# Patient Record
Sex: FEMALE | Race: BLACK OR AFRICAN AMERICAN | NOT HISPANIC OR LATINO | Employment: STUDENT | URBAN - METROPOLITAN AREA
[De-identification: names, ages, dates, MRNs, and addresses within clinical notes are randomized per-mention and may not be internally consistent; named-entity substitution may affect disease eponyms.]

---

## 2017-03-28 ENCOUNTER — OFFICE VISIT (OUTPATIENT)
Dept: OBSTETRICS AND GYNECOLOGY | Facility: CLINIC | Age: 22
End: 2017-03-28
Payer: MEDICAID

## 2017-03-28 VITALS
DIASTOLIC BLOOD PRESSURE: 74 MMHG | BODY MASS INDEX: 25.62 KG/M2 | SYSTOLIC BLOOD PRESSURE: 118 MMHG | HEIGHT: 60 IN | WEIGHT: 130.5 LBS

## 2017-03-28 DIAGNOSIS — N89.8 VAGINAL DISCHARGE: Primary | ICD-10-CM

## 2017-03-28 DIAGNOSIS — N76.0 BACTERIAL VAGINOSIS: ICD-10-CM

## 2017-03-28 DIAGNOSIS — B96.89 BACTERIAL VAGINOSIS: ICD-10-CM

## 2017-03-28 DIAGNOSIS — N94.6 DYSMENORRHEA: ICD-10-CM

## 2017-03-28 PROCEDURE — 99999 PR PBB SHADOW E&M-EST. PATIENT-LVL II: CPT | Mod: PBBFAC,,, | Performed by: OBSTETRICS & GYNECOLOGY

## 2017-03-28 PROCEDURE — 87210 SMEAR WET MOUNT SALINE/INK: CPT | Mod: PBBFAC,PO | Performed by: OBSTETRICS & GYNECOLOGY

## 2017-03-28 PROCEDURE — 99212 OFFICE O/P EST SF 10 MIN: CPT | Mod: PBBFAC,PO | Performed by: OBSTETRICS & GYNECOLOGY

## 2017-03-28 PROCEDURE — 99203 OFFICE O/P NEW LOW 30 MIN: CPT | Mod: 25,S$PBB,, | Performed by: OBSTETRICS & GYNECOLOGY

## 2017-03-28 PROCEDURE — 87591 N.GONORRHOEAE DNA AMP PROB: CPT

## 2017-03-28 RX ORDER — NAPROXEN SODIUM 550 MG/1
550 TABLET ORAL EVERY 12 HOURS PRN
Qty: 30 TABLET | Refills: 3 | Status: SHIPPED | OUTPATIENT
Start: 2017-03-28 | End: 2018-03-01

## 2017-03-28 RX ORDER — METRONIDAZOLE 500 MG/1
500 TABLET ORAL 2 TIMES DAILY
Qty: 14 TABLET | Refills: 0 | Status: SHIPPED | OUTPATIENT
Start: 2017-03-28 | End: 2017-04-04

## 2017-03-28 RX ORDER — LEVONORGESTREL AND ETHINYL ESTRADIOL 0.15-0.03
1 KIT ORAL DAILY
Qty: 84 TABLET | Refills: 3 | Status: SHIPPED | OUTPATIENT
Start: 2017-03-28 | End: 2018-03-01

## 2017-03-28 RX ORDER — NORGESTIMATE AND ETHINYL ESTRADIOL 7DAYSX3 28
KIT ORAL
COMMUNITY
Start: 2017-03-12 | End: 2017-03-28 | Stop reason: ALTCHOICE

## 2017-03-28 NOTE — PROGRESS NOTES
Subjective:       Patient ID: Saima Da Silva is a 22 y.o. female.    Chief Complaint:  Vaginal Discharge      History of Present Illness  HPI  Presents with vaginal d/c with odor x a few months.  Some help after starting a probiotic, but odor still present.  Is mutually monogamous with one male partner.  Uses tampons.  Takes showers only, and does not douche.  Pt takes Tri Previfem OCP's, but still has significant cramping during her period.  Minimal relief with ibuprofen.  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    GYN & OB History  Patient's last menstrual period was 2017 (approximate).   Date of Last Pap: No result found    OB History    Para Term  AB SAB TAB Ectopic Multiple Living   0 0 0 0 0 0 0 0 0 0             Review of Systems  Review of Systems   Constitutional: Negative for fatigue, fever and unexpected weight change.   Gastrointestinal: Negative for abdominal pain, constipation, diarrhea, nausea and vomiting.   Genitourinary: Positive for vaginal discharge, dysmenorrhea and vaginal odor. Negative for dyspareunia, dysuria, frequency, hematuria, menorrhagia, menstrual problem, pelvic pain, urgency, vaginal bleeding, vaginal pain and postcoital bleeding.           Objective:    Physical Exam:   Constitutional: She is oriented to person, place, and time. She appears well-developed and well-nourished. No distress.             Abdominal: Soft. She exhibits no distension and no mass. There is no tenderness. There is no rebound and no guarding. Hernia confirmed negative in the right inguinal area and confirmed negative in the left inguinal area.     Genitourinary: Pelvic exam was performed with patient supine. There is no rash, tenderness, lesion or injury on the right labia. There is no rash, tenderness, lesion or injury on the left labia. Uterus is not deviated, not enlarged, not fixed, not tender and not experiencing uterine prolapse. Right adnexum displays  no mass, no tenderness and no fullness. Left adnexum displays no mass, no tenderness and no fullness. No erythema, tenderness, bleeding, rectocele, cystocele or unspecified prolapse of vaginal walls in the vagina. No foreign body in the vagina. No signs of injury around the vagina. Vaginal discharge (white with fishy odor) found. Cervix exhibits no motion tenderness, no discharge and no friability.        Uterus Size: 6 cm       Neurological: She is alert and oriented to person, place, and time.     Psychiatric: She has a normal mood and affect.        wet prep: many clue cells  Assessment:        1. Vaginal discharge    2. Dysmenorrhea    3. Bacterial vaginosis                Plan:      Saima was seen today for vaginal discharge.    Diagnoses and all orders for this visit:    Vaginal discharge  -     C. trachomatis/N. gonorrhoeae by AMP DNA Cervix    Dysmenorrhea  -     levonorgestrel-ethinyl estradiol (SEASONALE) 0.15 mg-30 mcg per tablet; Take 1 tablet by mouth once daily.  -     naproxen sodium (ANAPROX) 550 MG tablet; Take 1 tablet (550 mg total) by mouth every 12 (twelve) hours as needed.    Bacterial vaginosis  -     metronidazole (FLAGYL) 500 MG tablet; Take 1 tablet (500 mg total) by mouth 2 (two) times daily.    Patient was counseled today on vaginitis prevention including :  a. avoiding feminine products such as deoderant soaps, body wash, bubble bath, douches, scented toilet paper, deoderant tampons or pads, feminine wipes, chronic pad use, etc.  b. avoiding other vulvovaginal irritants such as long hot baths, humidity, tight, synthetic clothing, chlorine and sitting around in wet bathing suits  c. wearing cotton underwear, avoiding thong underwear and no underwear to bed  d. taking showers instead of baths and use a hair dryer on cool setting afterwards to dry  e. wearing cotton to exercise and shower immediately after exercise and change clothes  Will switch to prolonged dosing OCP's and add  naproxen.  RTC 1 year or sooner prn.

## 2017-04-03 LAB
C TRACH DNA SPEC QL NAA+PROBE: NEGATIVE
N GONORRHOEA DNA SPEC QL NAA+PROBE: NEGATIVE

## 2017-06-05 ENCOUNTER — TELEPHONE (OUTPATIENT)
Dept: OBSTETRICS AND GYNECOLOGY | Facility: CLINIC | Age: 22
End: 2017-06-05

## 2017-06-05 DIAGNOSIS — N76.0 BACTERIAL VAGINOSIS: Primary | ICD-10-CM

## 2017-06-05 DIAGNOSIS — B96.89 BACTERIAL VAGINOSIS: Primary | ICD-10-CM

## 2017-06-05 NOTE — TELEPHONE ENCOUNTER
----- Message from Nash Rodriguez sent at 6/2/2017  3:27 PM CDT -----  Contact: pt   State she is calling to get a refill on her antibiotic/ pt is unaware of the name and can be reached at [phone]//rayna/susanna    pls call when it's called in per pt     Pt pharm is   [facility] - [address]  [address] [facility] [address]  Phone: [phone] Fax: [phone]

## 2017-06-06 RX ORDER — METRONIDAZOLE 500 MG/1
500 TABLET ORAL 2 TIMES DAILY
Qty: 14 TABLET | Refills: 0 | Status: SHIPPED | OUTPATIENT
Start: 2017-06-06 | End: 2017-06-13

## 2018-03-01 ENCOUNTER — TELEPHONE (OUTPATIENT)
Dept: OBSTETRICS AND GYNECOLOGY | Facility: CLINIC | Age: 23
End: 2018-03-01

## 2018-03-01 ENCOUNTER — OFFICE VISIT (OUTPATIENT)
Dept: OBSTETRICS AND GYNECOLOGY | Facility: CLINIC | Age: 23
End: 2018-03-01
Payer: MEDICAID

## 2018-03-01 VITALS
SYSTOLIC BLOOD PRESSURE: 116 MMHG | HEIGHT: 60 IN | BODY MASS INDEX: 26.79 KG/M2 | WEIGHT: 136.44 LBS | DIASTOLIC BLOOD PRESSURE: 78 MMHG

## 2018-03-01 DIAGNOSIS — B96.89 BV (BACTERIAL VAGINOSIS): Primary | ICD-10-CM

## 2018-03-01 DIAGNOSIS — Z30.016 ENCOUNTER FOR INITIAL PRESCRIPTION OF TRANSDERMAL PATCH HORMONAL CONTRACEPTIVE DEVICE: ICD-10-CM

## 2018-03-01 DIAGNOSIS — N76.0 BV (BACTERIAL VAGINOSIS): Primary | ICD-10-CM

## 2018-03-01 PROBLEM — N94.6 DYSMENORRHEA: Status: RESOLVED | Noted: 2017-03-28 | Resolved: 2018-03-01

## 2018-03-01 LAB
CANDIDA RRNA VAG QL PROBE: NEGATIVE
G VAGINALIS RRNA GENITAL QL PROBE: POSITIVE
T VAGINALIS RRNA GENITAL QL PROBE: NEGATIVE

## 2018-03-01 PROCEDURE — 87480 CANDIDA DNA DIR PROBE: CPT

## 2018-03-01 PROCEDURE — 99213 OFFICE O/P EST LOW 20 MIN: CPT | Mod: S$PBB,,, | Performed by: NURSE PRACTITIONER

## 2018-03-01 PROCEDURE — 87491 CHLMYD TRACH DNA AMP PROBE: CPT

## 2018-03-01 PROCEDURE — 99213 OFFICE O/P EST LOW 20 MIN: CPT | Mod: PBBFAC | Performed by: NURSE PRACTITIONER

## 2018-03-01 PROCEDURE — 99999 PR PBB SHADOW E&M-EST. PATIENT-LVL III: CPT | Mod: PBBFAC,,, | Performed by: NURSE PRACTITIONER

## 2018-03-01 RX ORDER — NORELGESTROMIN AND ETHINYL ESTRADIOL 35; 150 UG/MG; UG/MG
1 PATCH TRANSDERMAL
Qty: 4 PATCH | Refills: 11 | Status: SHIPPED | OUTPATIENT
Start: 2018-03-01 | End: 2020-01-28

## 2018-03-01 NOTE — PROGRESS NOTES
CC: Discuss birth control options     Saima Da Silva is a 22 y.o. female  presents birth control options..  LMP: Patient's last menstrual period was 02/15/2018..  Patient is currently on OCP and wants to discuss other options. Patient reports a vaginal discharge for the past 3 days.     History reviewed. No pertinent past medical history.  Past Surgical History:   Procedure Laterality Date    EYE SURGERY      wrist cyst removal Right      Social History     Social History    Marital status: Single     Spouse name: N/A    Number of children: N/A    Years of education: N/A     Occupational History    Not on file.     Social History Main Topics    Smoking status: Never Smoker    Smokeless tobacco: Never Used    Alcohol use No    Drug use: No    Sexual activity: Yes     Partners: Male     Other Topics Concern    Not on file     Social History Narrative    No narrative on file     Family History   Problem Relation Age of Onset    Breast cancer Maternal Grandmother 66    Colon cancer Neg Hx     Ovarian cancer Neg Hx      OB History      Para Term  AB Living    0 0 0 0 0 0    SAB TAB Ectopic Multiple Live Births    0 0 0 0            /78   Ht 5' (1.524 m)   Wt 61.9 kg (136 lb 7.4 oz)   LMP 02/15/2018   BMI 26.65 kg/m²       ROS:  GENERAL: Denies weight gain or weight loss. Feeling well overall.   SKIN: Denies rash or lesions.   HEAD: Denies head injury or headache.   NODES: Denies enlarged lymph nodes.   CHEST: Denies chest pain or shortness of breath.   CARDIOVASCULAR: Denies palpitations or left sided chest pain.   ABDOMEN: No abdominal pain, constipation, diarrhea, nausea, vomiting or rectal bleeding.   URINARY: No frequency, dysuria, hematuria, or burning on urination.  REPRODUCTIVE: See HPI.   BREASTS: The patient performs breast self-examination and denies pain, lumps, or nipple discharge.   HEMATOLOGIC: No easy bruisability or excessive bleeding.   MUSCULOSKELETAL:  Denies joint pain or swelling.   NEUROLOGIC: Denies syncope or weakness.   PSYCHIATRIC: Denies depression, anxiety or mood swings.    PHYSICAL EXAM:  APPEARANCE: Well nourished, well developed, in no acute distress.  AFFECT: WNL, alert and oriented x 3  PELVIC: Normal external genitalia without lesions. Vagina moist and well rugated without lesions or discharge.  Cervix pink, without lesions, discharge or tenderness.     1. BV (bacterial vaginosis)  Vaginosis Screen by DNA Probe    C. trachomatis/N. gonorrhoeae by AMP DNA Vagina   2. Encounter for initial prescription of transdermal patch hormonal contraceptive device       PLAN:  Affirm rx   Exam was unremarkable  Patient was educated on birth control options; wants to proceed with patch

## 2018-03-01 NOTE — TELEPHONE ENCOUNTER
----- Message from Mitra Islas sent at 3/1/2018 11:05 AM CST -----  Contact: self [phone]  States that she is returning Ofelia's call. Please call back at [phone]//thank you acc

## 2018-03-01 NOTE — TELEPHONE ENCOUNTER
----- Message from Jacquelyn Harvey sent at 3/1/2018  8:27 AM CST -----  Contact: pt  She's calling in regards to be worked into schedule for annual pls call pt back at [phone] (home)

## 2018-03-02 ENCOUNTER — TELEPHONE (OUTPATIENT)
Dept: OBSTETRICS AND GYNECOLOGY | Facility: CLINIC | Age: 23
End: 2018-03-02

## 2018-03-02 LAB
C TRACH DNA SPEC QL NAA+PROBE: NOT DETECTED
N GONORRHOEA DNA SPEC QL NAA+PROBE: NOT DETECTED

## 2018-03-02 RX ORDER — METRONIDAZOLE 500 MG/1
500 TABLET ORAL EVERY 12 HOURS
Qty: 14 TABLET | Refills: 0 | Status: SHIPPED | OUTPATIENT
Start: 2018-03-02 | End: 2018-03-09

## 2018-03-02 NOTE — TELEPHONE ENCOUNTER
----- Message from Pat Carlos sent at 3/2/2018  3:00 PM CST -----  Contact: PT   PT request call back to see why her PAP wasn't done. .[phone] (home)

## 2018-05-14 ENCOUNTER — TELEPHONE (OUTPATIENT)
Dept: OBSTETRICS AND GYNECOLOGY | Facility: CLINIC | Age: 23
End: 2018-05-14

## 2018-05-14 NOTE — TELEPHONE ENCOUNTER
----- Message from Priya De Guzman sent at 5/14/2018 10:07 AM CDT -----  Contact: PT  States she would like to schedule her annual pap on Wednesday with Ms Dunia Amador. Nothing coming up on the schedule, she's an established Medicaid patient. Please call pt at [phone]. Thank you

## 2018-05-22 ENCOUNTER — OFFICE VISIT (OUTPATIENT)
Dept: OBSTETRICS AND GYNECOLOGY | Facility: CLINIC | Age: 23
End: 2018-05-22
Payer: MEDICAID

## 2018-05-22 ENCOUNTER — LAB VISIT (OUTPATIENT)
Dept: LAB | Facility: HOSPITAL | Age: 23
End: 2018-05-22
Attending: NURSE PRACTITIONER
Payer: MEDICAID

## 2018-05-22 VITALS
HEIGHT: 60 IN | SYSTOLIC BLOOD PRESSURE: 138 MMHG | DIASTOLIC BLOOD PRESSURE: 66 MMHG | WEIGHT: 140.19 LBS | BODY MASS INDEX: 27.52 KG/M2

## 2018-05-22 DIAGNOSIS — Z01.419 CERVICAL SMEAR, AS PART OF ROUTINE GYNECOLOGICAL EXAMINATION: ICD-10-CM

## 2018-05-22 DIAGNOSIS — Z00.00 PREVENTATIVE HEALTH CARE: Primary | ICD-10-CM

## 2018-05-22 DIAGNOSIS — Z00.00 PREVENTATIVE HEALTH CARE: ICD-10-CM

## 2018-05-22 PROCEDURE — 36415 COLL VENOUS BLD VENIPUNCTURE: CPT

## 2018-05-22 PROCEDURE — 86703 HIV-1/HIV-2 1 RESULT ANTBDY: CPT

## 2018-05-22 PROCEDURE — 86592 SYPHILIS TEST NON-TREP QUAL: CPT

## 2018-05-22 PROCEDURE — 99213 OFFICE O/P EST LOW 20 MIN: CPT | Mod: PBBFAC | Performed by: NURSE PRACTITIONER

## 2018-05-22 PROCEDURE — 88175 CYTOPATH C/V AUTO FLUID REDO: CPT

## 2018-05-22 PROCEDURE — 87480 CANDIDA DNA DIR PROBE: CPT

## 2018-05-22 PROCEDURE — 87491 CHLMYD TRACH DNA AMP PROBE: CPT

## 2018-05-22 PROCEDURE — 99213 OFFICE O/P EST LOW 20 MIN: CPT | Mod: S$PBB,,, | Performed by: NURSE PRACTITIONER

## 2018-05-22 PROCEDURE — 99999 PR PBB SHADOW E&M-EST. PATIENT-LVL III: CPT | Mod: PBBFAC,,, | Performed by: NURSE PRACTITIONER

## 2018-05-22 PROCEDURE — 87510 GARDNER VAG DNA DIR PROBE: CPT

## 2018-05-22 NOTE — PROGRESS NOTES
CC: Well woman exam    Saima Da Silva is a 23 y.o. female  presents for well woman exam.  LMP: Patient's last menstrual period was 2018..  No issues, problems, or complaints. Using patch for birth control. Cycles are every 26-28 days, not heavy. Never had a pap exam. Wants STD assessment.      History reviewed. No pertinent past medical history.  Past Surgical History:   Procedure Laterality Date    EYE SURGERY      wrist cyst removal Right      Social History     Social History    Marital status: Single     Spouse name: N/A    Number of children: N/A    Years of education: N/A     Occupational History    Not on file.     Social History Main Topics    Smoking status: Never Smoker    Smokeless tobacco: Never Used    Alcohol use No    Drug use: No    Sexual activity: Yes     Partners: Male     Birth control/ protection: Patch     Other Topics Concern    Not on file     Social History Narrative    No narrative on file     Family History   Problem Relation Age of Onset    Breast cancer Maternal Grandmother 66    Colon cancer Neg Hx     Ovarian cancer Neg Hx      OB History      Para Term  AB Living    0 0 0 0 0 0    SAB TAB Ectopic Multiple Live Births    0 0 0 0            /66 (BP Location: Right arm, Patient Position: Sitting, BP Method: Medium (Manual))   Ht 5' (1.524 m)   Wt 63.6 kg (140 lb 3.4 oz)   LMP 2018   BMI 27.38 kg/m²       ROS:  GENERAL: Denies weight gain or weight loss. Feeling well overall.   SKIN: Denies rash or lesions.   HEAD: Denies head injury or headache.   NODES: Denies enlarged lymph nodes.   CHEST: Denies chest pain or shortness of breath.   CARDIOVASCULAR: Denies palpitations or left sided chest pain.   ABDOMEN: No abdominal pain, constipation, diarrhea, nausea, vomiting or rectal bleeding.   URINARY: No frequency, dysuria, hematuria, or burning on urination.  REPRODUCTIVE: See HPI.   BREASTS: The patient performs breast  self-examination and denies pain, lumps, or nipple discharge.   HEMATOLOGIC: No easy bruisability or excessive bleeding.   MUSCULOSKELETAL: Denies joint pain or swelling.   NEUROLOGIC: Denies syncope or weakness.   PSYCHIATRIC: Denies depression, anxiety or mood swings.    PHYSICAL EXAM:  APPEARANCE: Well nourished, well developed, in no acute distress.  AFFECT: WNL, alert and oriented x 3  SKIN: No acne or hirsutism  NECK: Neck symmetric without masses or thyromegaly  NODES: No inguinal, cervical, axillary, or femoral lymph node enlargement  CHEST: Good respiratory effect  ABDOMEN: Soft.  No tenderness or masses.  No hepatosplenomegaly.  No hernias.  BREASTS: Symmetrical, no skin changes or visible lesions.  No palpable masses, nipple discharge bilaterally.  PELVIC: Normal external genitalia without lesions.  Normal hair distribution.  Adequate perineal body, normal urethral meatus.  Vagina moist and well rugated without lesions or discharge.  Cervix pink, without lesions, discharge or tenderness.  Bimanual exam shows uterus to be normal size, regular, mobile and nontender.  Adnexa without masses or tenderness.    EXTREMITIES: No edema.    1. Preventative health care  RPR    HIV-1 and HIV-2 antibodies    Liquid-based pap smear, screening    C. trachomatis/N. gonorrhoeae by AMP DNA Vagina    Vaginosis Screen by DNA Probe   2. Cervical smear, as part of routine gynecological examination  RPR    HIV-1 and HIV-2 antibodies    Liquid-based pap smear, screening    C. trachomatis/N. gonorrhoeae by AMP DNA Vagina    Vaginosis Screen by DNA Probe    PLAN:  Pap exam  STD assessment  Patient was counseled today on A.C.S. Pap guidelines and recommendations for yearly pelvic exams, mammograms and monthly self breast exams; to see her PCP for other health maintenance.

## 2018-05-23 LAB
C TRACH DNA SPEC QL NAA+PROBE: NOT DETECTED
HIV 1+2 AB+HIV1 P24 AG SERPL QL IA: NEGATIVE
N GONORRHOEA DNA SPEC QL NAA+PROBE: NOT DETECTED
RPR SER QL: NORMAL

## 2018-05-23 RX ORDER — METRONIDAZOLE 500 MG/1
500 TABLET ORAL EVERY 12 HOURS
Qty: 14 TABLET | Refills: 0 | Status: SHIPPED | OUTPATIENT
Start: 2018-05-23 | End: 2018-05-30

## 2018-11-01 ENCOUNTER — PATIENT MESSAGE (OUTPATIENT)
Dept: OBSTETRICS AND GYNECOLOGY | Facility: CLINIC | Age: 23
End: 2018-11-01

## 2019-04-09 ENCOUNTER — OFFICE VISIT (OUTPATIENT)
Dept: OBSTETRICS AND GYNECOLOGY | Facility: CLINIC | Age: 24
End: 2019-04-09
Payer: MEDICAID

## 2019-04-09 VITALS
BODY MASS INDEX: 27.74 KG/M2 | HEIGHT: 60 IN | SYSTOLIC BLOOD PRESSURE: 148 MMHG | WEIGHT: 141.31 LBS | DIASTOLIC BLOOD PRESSURE: 78 MMHG

## 2019-04-09 DIAGNOSIS — N76.0 BV (BACTERIAL VAGINOSIS): Primary | ICD-10-CM

## 2019-04-09 DIAGNOSIS — B96.89 BV (BACTERIAL VAGINOSIS): Primary | ICD-10-CM

## 2019-04-09 PROCEDURE — 99213 OFFICE O/P EST LOW 20 MIN: CPT | Mod: PBBFAC | Performed by: NURSE PRACTITIONER

## 2019-04-09 PROCEDURE — 87510 GARDNER VAG DNA DIR PROBE: CPT

## 2019-04-09 PROCEDURE — 99213 OFFICE O/P EST LOW 20 MIN: CPT | Mod: S$PBB,,, | Performed by: NURSE PRACTITIONER

## 2019-04-09 PROCEDURE — 99999 PR PBB SHADOW E&M-EST. PATIENT-LVL III: CPT | Mod: PBBFAC,,, | Performed by: NURSE PRACTITIONER

## 2019-04-09 PROCEDURE — 99213 PR OFFICE/OUTPT VISIT, EST, LEVL III, 20-29 MIN: ICD-10-PCS | Mod: S$PBB,,, | Performed by: NURSE PRACTITIONER

## 2019-04-09 PROCEDURE — 99999 PR PBB SHADOW E&M-EST. PATIENT-LVL III: ICD-10-PCS | Mod: PBBFAC,,, | Performed by: NURSE PRACTITIONER

## 2019-04-09 PROCEDURE — 87480 CANDIDA DNA DIR PROBE: CPT

## 2019-04-09 RX ORDER — METRONIDAZOLE 7.5 MG/G
GEL VAGINAL
Qty: 5 APPLICATOR | Refills: 4 | Status: SHIPPED | OUTPATIENT
Start: 2019-04-09 | End: 2019-10-02

## 2019-04-09 NOTE — PATIENT INSTRUCTIONS

## 2019-04-09 NOTE — PROGRESS NOTES
CC: Recurrent BV    Saima Da Silva is a 24 y.o. female  presents for recurrent BV.  LMP: Patient's last menstrual period was 2019..  No pelvic pain. No OTC meds.    No past medical history on file.  Past Surgical History:   Procedure Laterality Date    EYE SURGERY      wrist cyst removal Right      Social History     Socioeconomic History    Marital status: Single     Spouse name: Not on file    Number of children: Not on file    Years of education: Not on file    Highest education level: Not on file   Occupational History    Not on file   Social Needs    Financial resource strain: Not on file    Food insecurity:     Worry: Not on file     Inability: Not on file    Transportation needs:     Medical: Not on file     Non-medical: Not on file   Tobacco Use    Smoking status: Never Smoker    Smokeless tobacco: Never Used   Substance and Sexual Activity    Alcohol use: Yes     Frequency: Monthly or less     Drinks per session: 1 or 2     Binge frequency: Never    Drug use: No    Sexual activity: Yes     Partners: Male   Lifestyle    Physical activity:     Days per week: Not on file     Minutes per session: Not on file    Stress: Not on file   Relationships    Social connections:     Talks on phone: Not on file     Gets together: Not on file     Attends Caodaism service: Not on file     Active member of club or organization: Not on file     Attends meetings of clubs or organizations: Not on file     Relationship status: Not on file   Other Topics Concern    Not on file   Social History Narrative    Not on file     Family History   Problem Relation Age of Onset    Breast cancer Maternal Grandmother 66    Colon cancer Neg Hx     Ovarian cancer Neg Hx      OB History        0    Para   0    Term   0       0    AB   0    Living   0       SAB   0    TAB   0    Ectopic   0    Multiple   0    Live Births                     BP (!) 148/78 (BP Location: Left arm, Patient Position:  Sitting, BP Method: Medium (Manual))   Ht 5' (1.524 m)   Wt 64.1 kg (141 lb 5 oz)   LMP 03/20/2019   BMI 27.60 kg/m²       ROS:  GENERAL: Denies weight gain or weight loss. Feeling well overall.   CARDIOVASCULAR: Denies palpitations or left sided chest pain.   ABDOMEN: No abdominal pain, constipation, diarrhea, nausea, vomiting or rectal bleeding.   URINARY: No frequency, dysuria, hematuria, or burning on urination.  REPRODUCTIVE: See HPI.       PHYSICAL EXAM:  APPEARANCE: Well nourished, well developed, in no acute distress.  PELVIC: Normal external genitalia without lesions  Vagina thick, white discharge.    1. BV (bacterial vaginosis)  Vaginosis Screen by DNA Probe     PLAN:  Wet prep; Clue cells.  Patient to start bi-weekly MetroGel for 3 months

## 2019-04-15 LAB
BACTERIAL VAGINOSIS DNA: POSITIVE
CANDIDA GLABRATA DNA: NEGATIVE
CANDIDA KRUSEI DNA: NEGATIVE
CANDIDA RRNA VAG QL PROBE: NEGATIVE
T VAGINALIS RRNA GENITAL QL PROBE: NEGATIVE

## 2019-07-11 ENCOUNTER — TELEPHONE (OUTPATIENT)
Dept: OBSTETRICS AND GYNECOLOGY | Facility: CLINIC | Age: 24
End: 2019-07-11

## 2019-07-11 NOTE — TELEPHONE ENCOUNTER
----- Message from Jeff Marques sent at 7/11/2019  3:57 PM CDT -----  Contact: [phone]  Would like a nurse to contact her regarding her refills they need approval because her insurance was changed , please contact her @ [phone]. Thanks

## 2019-07-17 ENCOUNTER — TELEPHONE (OUTPATIENT)
Dept: OBSTETRICS AND GYNECOLOGY | Facility: CLINIC | Age: 24
End: 2019-07-17

## 2019-07-17 NOTE — TELEPHONE ENCOUNTER
Spoke with patient states she needs a PA on metrogel. Asked pt if she has been needing a PA every month and states no, was informed that her medicaid changed on 5/1/19. Pt states she has not received a new card, advised to call medicaid to see if her card number has changed. Advised to call clinic after so that we can contact [facility] and start PA process. Patient verbalized understanding

## 2019-07-17 NOTE — TELEPHONE ENCOUNTER
----- Message from Ramona Rider sent at 7/17/2019  3:19 PM CDT -----  Contact: Patient  Type:  Patient Returning Call    Who Called: Saima  Who Left Message for Patient: Diane  Does the patient know what this is regarding?: Insurance  Would the patient rather a call back or a response via [employer]? Call back   Best Call Back Number: Please call her at [phone]  Additional Information: n/a

## 2019-07-17 NOTE — TELEPHONE ENCOUNTER
Spoke with patient states she still has [employer] but also has [employer] Membership # [id number]. Called Blake (pharmacist) states it is approved through [employer] but not [employer]. PA request number:[phone].      Started PA with Customer Care Rep:Deepak, will take 24 hours to review and will receive a fax from [employer].     Ref #[id number]

## 2019-07-17 NOTE — TELEPHONE ENCOUNTER
----- Message from Phuong Huntley sent at 7/17/2019  2:13 PM CDT -----  Contact: Pt   Pt is calling regarding requesting to have nurse call pt back. Pt states that call is regarding the  Pre Authorization that is needed for the medication metroNIDAZOLE (METROGEL) 0.75 % vaginal gel. Pt states that the pharmacy is needed the pre authorization to release the medication and NP Lulhing is needing to request that Authorization  from Pt insurance payor .[phone] (home)           .Thank You  Bev Huntley

## 2019-10-02 ENCOUNTER — OFFICE VISIT (OUTPATIENT)
Dept: OBSTETRICS AND GYNECOLOGY | Facility: CLINIC | Age: 24
End: 2019-10-02
Payer: COMMERCIAL

## 2019-10-02 ENCOUNTER — PATIENT MESSAGE (OUTPATIENT)
Dept: OBSTETRICS AND GYNECOLOGY | Facility: CLINIC | Age: 24
End: 2019-10-02

## 2019-10-02 ENCOUNTER — TELEPHONE (OUTPATIENT)
Dept: INTERNAL MEDICINE | Facility: CLINIC | Age: 24
End: 2019-10-02

## 2019-10-02 ENCOUNTER — LAB VISIT (OUTPATIENT)
Dept: LAB | Facility: HOSPITAL | Age: 24
End: 2019-10-02
Attending: NURSE PRACTITIONER
Payer: COMMERCIAL

## 2019-10-02 VITALS
DIASTOLIC BLOOD PRESSURE: 70 MMHG | SYSTOLIC BLOOD PRESSURE: 128 MMHG | BODY MASS INDEX: 28.57 KG/M2 | HEIGHT: 60 IN | WEIGHT: 145.5 LBS

## 2019-10-02 DIAGNOSIS — N92.6 IRREGULAR MENSTRUATION: Primary | ICD-10-CM

## 2019-10-02 DIAGNOSIS — N92.6 IRREGULAR MENSTRUATION: ICD-10-CM

## 2019-10-02 LAB
ALBUMIN SERPL BCP-MCNC: 3.7 G/DL (ref 3.5–5.2)
ALP SERPL-CCNC: 61 U/L (ref 55–135)
ALT SERPL W/O P-5'-P-CCNC: 12 U/L (ref 10–44)
ANION GAP SERPL CALC-SCNC: 11 MMOL/L (ref 8–16)
ANISOCYTOSIS BLD QL SMEAR: SLIGHT
AST SERPL-CCNC: 18 U/L (ref 10–40)
BASOPHILS # BLD AUTO: 0.04 K/UL (ref 0–0.2)
BASOPHILS NFR BLD: 0.5 % (ref 0–1.9)
BILIRUB SERPL-MCNC: 0.4 MG/DL (ref 0.1–1)
BUN SERPL-MCNC: 12 MG/DL (ref 6–20)
CALCIUM SERPL-MCNC: 9.4 MG/DL (ref 8.7–10.5)
CHLORIDE SERPL-SCNC: 104 MMOL/L (ref 95–110)
CO2 SERPL-SCNC: 23 MMOL/L (ref 23–29)
CREAT SERPL-MCNC: 1 MG/DL (ref 0.5–1.4)
DIFFERENTIAL METHOD: ABNORMAL
EOSINOPHIL # BLD AUTO: 0 K/UL (ref 0–0.5)
EOSINOPHIL NFR BLD: 0.3 % (ref 0–8)
ERYTHROCYTE [DISTWIDTH] IN BLOOD BY AUTOMATED COUNT: 17.1 % (ref 11.5–14.5)
EST. GFR  (AFRICAN AMERICAN): >60 ML/MIN/1.73 M^2
EST. GFR  (NON AFRICAN AMERICAN): >60 ML/MIN/1.73 M^2
GLUCOSE SERPL-MCNC: 77 MG/DL (ref 70–110)
HCG INTACT+B SERPL-ACNC: <1.2 MIU/ML
HCT VFR BLD AUTO: 31.9 % (ref 37–48.5)
HGB BLD-MCNC: 9.7 G/DL (ref 12–16)
HYPOCHROMIA BLD QL SMEAR: ABNORMAL
LYMPHOCYTES # BLD AUTO: 2.6 K/UL (ref 1–4.8)
LYMPHOCYTES NFR BLD: 30.6 % (ref 18–48)
MCH RBC QN AUTO: 22.7 PG (ref 27–31)
MCHC RBC AUTO-ENTMCNC: 30.4 G/DL (ref 32–36)
MCV RBC AUTO: 75 FL (ref 82–98)
MONOCYTES # BLD AUTO: 0.6 K/UL (ref 0.3–1)
MONOCYTES NFR BLD: 7.3 % (ref 4–15)
NEUTROPHILS # BLD AUTO: 5.3 K/UL (ref 1.8–7.7)
NEUTROPHILS NFR BLD: 61.3 % (ref 38–73)
PLATELET # BLD AUTO: 315 K/UL (ref 150–350)
PLATELET BLD QL SMEAR: ABNORMAL
PMV BLD AUTO: 11 FL (ref 9.2–12.9)
POIKILOCYTOSIS BLD QL SMEAR: SLIGHT
POLYCHROMASIA BLD QL SMEAR: ABNORMAL
POTASSIUM SERPL-SCNC: 3.7 MMOL/L (ref 3.5–5.1)
PROT SERPL-MCNC: 7.5 G/DL (ref 6–8.4)
RBC # BLD AUTO: 4.27 M/UL (ref 4–5.4)
SODIUM SERPL-SCNC: 138 MMOL/L (ref 136–145)
TSH SERPL DL<=0.005 MIU/L-ACNC: 0.99 UIU/ML (ref 0.4–4)
WBC # BLD AUTO: 8.59 K/UL (ref 3.9–12.7)

## 2019-10-02 PROCEDURE — 99999 PR PBB SHADOW E&M-EST. PATIENT-LVL III: CPT | Mod: PBBFAC,,, | Performed by: NURSE PRACTITIONER

## 2019-10-02 PROCEDURE — 3008F PR BODY MASS INDEX (BMI) DOCUMENTED: ICD-10-PCS | Mod: CPTII,S$GLB,, | Performed by: NURSE PRACTITIONER

## 2019-10-02 PROCEDURE — 84146 ASSAY OF PROLACTIN: CPT

## 2019-10-02 PROCEDURE — 80053 COMPREHEN METABOLIC PANEL: CPT

## 2019-10-02 PROCEDURE — 99213 OFFICE O/P EST LOW 20 MIN: CPT | Mod: S$GLB,,, | Performed by: NURSE PRACTITIONER

## 2019-10-02 PROCEDURE — 84443 ASSAY THYROID STIM HORMONE: CPT

## 2019-10-02 PROCEDURE — 99999 PR PBB SHADOW E&M-EST. PATIENT-LVL III: ICD-10-PCS | Mod: PBBFAC,,, | Performed by: NURSE PRACTITIONER

## 2019-10-02 PROCEDURE — 84702 CHORIONIC GONADOTROPIN TEST: CPT

## 2019-10-02 PROCEDURE — 36415 COLL VENOUS BLD VENIPUNCTURE: CPT

## 2019-10-02 PROCEDURE — 99213 PR OFFICE/OUTPT VISIT, EST, LEVL III, 20-29 MIN: ICD-10-PCS | Mod: S$GLB,,, | Performed by: NURSE PRACTITIONER

## 2019-10-02 PROCEDURE — 3008F BODY MASS INDEX DOCD: CPT | Mod: CPTII,S$GLB,, | Performed by: NURSE PRACTITIONER

## 2019-10-02 PROCEDURE — 85025 COMPLETE CBC W/AUTO DIFF WBC: CPT

## 2019-10-02 RX ORDER — PHENTERMINE HYDROCHLORIDE 37.5 MG/1
37.5 TABLET ORAL DAILY
Refills: 0 | COMMUNITY
Start: 2019-09-18 | End: 2020-05-22

## 2019-10-02 NOTE — TELEPHONE ENCOUNTER
S/w pt RE New pt appt sched incorrectly w/ Dr. Marquez today, Dr. Marquez does not see pt's for OB GYN pt c/o irregular bleeding, since pt is estab pt w/ OB/GYN pt needs to f/u with OB provider. Pt voiced understanding, pt declined to sched appt offered tomorrow 10/03/19, pt stating she will CB to sched appt w/ OB as advised.

## 2019-10-02 NOTE — PROGRESS NOTES
CC: Irregular cycles    Saima Da Silva is a 24 y.o. female  presents for irregular cycles. LMP: Patient's last menstrual period was 2019..  No pelvic pain. No birth control. Is sexually active.       History reviewed. No pertinent past medical history.  Past Surgical History:   Procedure Laterality Date    EYE SURGERY      wrist cyst removal Right      Social History     Socioeconomic History    Marital status: Single     Spouse name: Not on file    Number of children: Not on file    Years of education: Not on file    Highest education level: Not on file   Occupational History    Not on file   Social Needs    Financial resource strain: Not on file    Food insecurity:     Worry: Not on file     Inability: Not on file    Transportation needs:     Medical: Not on file     Non-medical: Not on file   Tobacco Use    Smoking status: Never Smoker    Smokeless tobacco: Never Used   Substance and Sexual Activity    Alcohol use: Yes     Frequency: Monthly or less     Drinks per session: 1 or 2     Binge frequency: Never     Comment: occ    Drug use: No    Sexual activity: Yes     Partners: Male   Lifestyle    Physical activity:     Days per week: Not on file     Minutes per session: Not on file    Stress: Not on file   Relationships    Social connections:     Talks on phone: Not on file     Gets together: Not on file     Attends Cheondoism service: Not on file     Active member of club or organization: Not on file     Attends meetings of clubs or organizations: Not on file     Relationship status: Not on file   Other Topics Concern    Not on file   Social History Narrative    Not on file     Family History   Problem Relation Age of Onset    Breast cancer Maternal Grandmother 66    Colon cancer Neg Hx     Ovarian cancer Neg Hx      OB History        0    Para   0    Term   0       0    AB   0    Living   0       SAB   0    TAB   0    Ectopic   0    Multiple   0    Live Births                      /70 (BP Location: Right arm, Patient Position: Sitting, BP Method: Medium (Manual))   Ht 5' (1.524 m)   Wt 66 kg (145 lb 8.1 oz)   LMP 09/06/2019   BMI 28.42 kg/m²       ROS:  GENERAL: Denies weight gain or weight loss. Feeling well overall.   REPRODUCTIVE: See HPI.         1. Irregular menstruation  TSH    hCG, quantitative    CBC auto differential    Comprehensive metabolic panel    Prolactin    PLAN:  Labs  Patient wants to proceed with Nexplanon;paperwork signed

## 2019-10-03 DIAGNOSIS — D50.0 IRON DEFICIENCY ANEMIA DUE TO CHRONIC BLOOD LOSS: Primary | ICD-10-CM

## 2019-10-03 LAB — PROLACTIN SERPL IA-MCNC: 16.4 NG/ML (ref 5.2–26.5)

## 2019-10-03 RX ORDER — FERROUS SULFATE 325(65) MG
325 TABLET ORAL DAILY
Qty: 30 TABLET | Refills: 3 | Status: SHIPPED | OUTPATIENT
Start: 2019-10-03 | End: 2020-10-02

## 2019-10-16 ENCOUNTER — TELEPHONE (OUTPATIENT)
Dept: OBSTETRICS AND GYNECOLOGY | Facility: CLINIC | Age: 24
End: 2019-10-16

## 2019-10-16 ENCOUNTER — PATIENT MESSAGE (OUTPATIENT)
Dept: OBSTETRICS AND GYNECOLOGY | Facility: CLINIC | Age: 24
End: 2019-10-16

## 2019-10-16 NOTE — TELEPHONE ENCOUNTER
----- Message from Jacquelyn Harvey sent at 10/16/2019  1:02 PM CDT -----  Contact: PT   She's calling in regards to returning call      Pt request ;Dunia Amador leave a message on [employer]      Pt is at work

## 2019-10-17 ENCOUNTER — PATIENT MESSAGE (OUTPATIENT)
Dept: OBSTETRICS AND GYNECOLOGY | Facility: CLINIC | Age: 24
End: 2019-10-17

## 2019-10-17 RX ORDER — NORGESTIMATE AND ETHINYL ESTRADIOL 0.25-0.035
1 KIT ORAL DAILY
Qty: 28 TABLET | Refills: 11 | Status: SHIPPED | OUTPATIENT
Start: 2019-10-17 | End: 2020-01-28

## 2019-12-03 ENCOUNTER — LAB VISIT (OUTPATIENT)
Dept: LAB | Facility: HOSPITAL | Age: 24
End: 2019-12-03
Payer: COMMERCIAL

## 2019-12-03 DIAGNOSIS — D50.0 IRON DEFICIENCY ANEMIA DUE TO CHRONIC BLOOD LOSS: ICD-10-CM

## 2019-12-03 LAB
BASOPHILS # BLD AUTO: 0.04 K/UL (ref 0–0.2)
BASOPHILS NFR BLD: 0.5 % (ref 0–1.9)
DIFFERENTIAL METHOD: ABNORMAL
EOSINOPHIL # BLD AUTO: 0.1 K/UL (ref 0–0.5)
EOSINOPHIL NFR BLD: 0.8 % (ref 0–8)
ERYTHROCYTE [DISTWIDTH] IN BLOOD BY AUTOMATED COUNT: 17.7 % (ref 11.5–14.5)
HCT VFR BLD AUTO: 40.5 % (ref 37–48.5)
HGB BLD-MCNC: 12.1 G/DL (ref 12–16)
IMM GRANULOCYTES # BLD AUTO: 0.03 K/UL (ref 0–0.04)
IMM GRANULOCYTES NFR BLD AUTO: 0.3 % (ref 0–0.5)
LYMPHOCYTES # BLD AUTO: 3.4 K/UL (ref 1–4.8)
LYMPHOCYTES NFR BLD: 38.8 % (ref 18–48)
MCH RBC QN AUTO: 25.4 PG (ref 27–31)
MCHC RBC AUTO-ENTMCNC: 29.9 G/DL (ref 32–36)
MCV RBC AUTO: 85 FL (ref 82–98)
MONOCYTES # BLD AUTO: 0.6 K/UL (ref 0.3–1)
MONOCYTES NFR BLD: 6.4 % (ref 4–15)
NEUTROPHILS # BLD AUTO: 4.7 K/UL (ref 1.8–7.7)
NEUTROPHILS NFR BLD: 53.2 % (ref 38–73)
NRBC BLD-RTO: 0 /100 WBC
PLATELET # BLD AUTO: 269 K/UL (ref 150–350)
PMV BLD AUTO: 11.8 FL (ref 9.2–12.9)
RBC # BLD AUTO: 4.77 M/UL (ref 4–5.4)
WBC # BLD AUTO: 8.8 K/UL (ref 3.9–12.7)

## 2019-12-03 PROCEDURE — 36415 COLL VENOUS BLD VENIPUNCTURE: CPT

## 2019-12-03 PROCEDURE — 85025 COMPLETE CBC W/AUTO DIFF WBC: CPT

## 2020-01-28 ENCOUNTER — PATIENT MESSAGE (OUTPATIENT)
Dept: OBSTETRICS AND GYNECOLOGY | Facility: CLINIC | Age: 25
End: 2020-01-28

## 2020-01-28 RX ORDER — LEVONORGESTREL AND ETHINYL ESTRADIOL 0.15-0.03
1 KIT ORAL DAILY
Qty: 84 TABLET | Refills: 3 | Status: SHIPPED | OUTPATIENT
Start: 2020-01-28 | End: 2020-04-21

## 2020-04-21 DIAGNOSIS — Z01.84 ANTIBODY RESPONSE EXAMINATION: ICD-10-CM

## 2020-04-24 ENCOUNTER — LAB VISIT (OUTPATIENT)
Dept: LAB | Facility: HOSPITAL | Age: 25
End: 2020-04-24
Attending: INTERNAL MEDICINE
Payer: COMMERCIAL

## 2020-04-24 DIAGNOSIS — Z01.84 ANTIBODY RESPONSE EXAMINATION: ICD-10-CM

## 2020-04-24 LAB — SARS-COV-2 IGG SERPL QL IA: NEGATIVE

## 2020-04-24 PROCEDURE — 36415 COLL VENOUS BLD VENIPUNCTURE: CPT

## 2020-04-24 PROCEDURE — 86769 SARS-COV-2 COVID-19 ANTIBODY: CPT
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
## 2020-04-27 DIAGNOSIS — M54.2 BILATERAL POSTERIOR NECK PAIN: ICD-10-CM

## 2020-04-27 DIAGNOSIS — M54.2 CERVICALGIA: Primary | ICD-10-CM

## 2020-04-28 ENCOUNTER — PATIENT MESSAGE (OUTPATIENT)
Dept: OBSTETRICS AND GYNECOLOGY | Facility: CLINIC | Age: 25
End: 2020-04-28

## 2020-05-07 ENCOUNTER — CLINICAL SUPPORT (OUTPATIENT)
Dept: REHABILITATION | Facility: HOSPITAL | Age: 25
End: 2020-05-07
Payer: COMMERCIAL

## 2020-05-07 DIAGNOSIS — M54.2 CERVICALGIA: ICD-10-CM

## 2020-05-07 DIAGNOSIS — M54.2 BILATERAL POSTERIOR NECK PAIN: ICD-10-CM
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
## 2020-05-07 PROCEDURE — 97161 PT EVAL LOW COMPLEX 20 MIN: CPT | Performed by: PHYSICAL THERAPIST

## 2020-05-07 PROCEDURE — 97110 THERAPEUTIC EXERCISES: CPT | Performed by: PHYSICAL THERAPIST
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
## 2020-05-11 ENCOUNTER — CLINICAL SUPPORT (OUTPATIENT)
Dept: REHABILITATION | Facility: HOSPITAL | Age: 25
End: 2020-05-11
Payer: COMMERCIAL

## 2020-05-11 DIAGNOSIS — M54.2 CERVICALGIA: ICD-10-CM

## 2020-05-11 PROCEDURE — 97140 MANUAL THERAPY 1/> REGIONS: CPT | Performed by: PHYSICAL THERAPIST

## 2020-05-11 PROCEDURE — 97110 THERAPEUTIC EXERCISES: CPT | Performed by: PHYSICAL THERAPIST

## 2020-05-11 NOTE — PROGRESS NOTES
"  Physical Therapy Daily Treatment Note     Name: Saima Da Silva  Clinic Number: [id number]    Therapy Diagnosis:   Encounter Diagnosis   Name Primary?    Cervicalgia      Physician: Bogdan Young MD    Visit Date: 5/11/2020    Physician Orders: PT Eval and Treat  Medical Diagnosis from Referral: Cervicalgia, bilateral posterior neck pain  Evaluation Date: 5/7/2020  Authorization Period Expiration: 7/2/2020  Plan of Care Expiration: 7/2/2020  Visit # / Visits authorized: 2/ 20     Precautions: Standard    Time In: 0745  Time Out: 0845  Total Billable Time: 60 minutes    SUBJECTIVE     Pt reports: she has been doing her exercises and is feeling better.  She reports that she had work yesterday, and is feeling a little sore.  She was compliant with home exercise program.  Response to previous treatment: Decrease in pain symptoms  Functional change: Sore from muscle fatigue    Pain: Minimal/10  Location: upper back and neck      TREATMENT     Saima received therapeutic exercises to develop ROM and posture for 45 minutes including:     Exercise 5/7/2020 5/11/2020     Upper Body Ergometer with cues for scapular rhythm  X 8 minutes (4 forward, 4 backward)     Upper trapezius stretch 3x30 seconds HEP     Doorway stretch 3x30 seconds HEP     Scapular set 10x10 seconds HEP     Cervical retraction   10x10 seconds   Cervical retraction with rotation   10x each direction   Pectoralis stretch    3x1 minutes   Supine serratus press over half foam roll   20x   Supine swimmers over half foam roll   20x   Prone scapular set  10x10 seconds   Prone bilateral shoulder extension  2x10   Prone bilateral "T"  x5 secondary to compensatory movement   Alisha external rotation with red theraband  2x10             x = exercise details same as prior session    Saima received the following manual therapy techniques: Joint mobilizations, Myofacial release and Soft tissue Mobilization were applied to the neck and upper back for 15 minutes, " including:  Soft tissue mobilization to bilateral thoracic paraspinal muscles  P/A mobilizations applied to the upper thoracic segments  Myofascial release to bilateral upper trapezius muscles  Myofascial release and soft tissue mobilization was applied to the cervical paraspinals    Home Exercises Provided and Patient Education Provided     Education provided:    Patient educated on the impairments noted above and the effects of physical therapy intervention to improve overall condition and QOL.    Patient educated on biomechanical justification for therapeutic exercise and importance of compliance with HEP in order to improve overall impairments and QOL    Patient educated on postural awareness and the use of a lumbar roll when in a seated position to reduce stress and maintain optimal alignment of the spine.    Patient educated on proper ergonomics at the work station in order to maintain optimal alignment of the musculoskeletal system and improve efficiency in the work environment.   Patient educated on the importance of improved core and hip strength in order to improve alignment of the spine and lower extremities with static positions and dynamic movement.    Patient educated on the importance of strong core and lower extremity musculature in order to improve both static and dynamic balance, improve gait mechanics, reduce fall risk and improve household and community mobility.     Written Home Exercises Provided: Patient instructed to cont prior HEP as well as to add the recommended exercises initiated today.  Exercises were reviewed and Saima was able to demonstrate them prior to the end of the session.  Saima demonstrated good  understanding of the education provided.     See EMR under Patient Instructions for exercises provided 5/11/2020.    ASSESSMENT   Patient reported muscle fatigue and stretching with the above exercises.  Patient demonstrated fatigue of posterior scapular stabilizers indicated by  "upper trapezius compensation with prone "T" activity.  Muscle tension resolved completely with manual therapy.    Saima is progressing well towards her goals.   Pt prognosis is Good.     Pt will continue to benefit from skilled outpatient physical therapy to address the deficits listed in the problem list box on initial evaluation, provide pt/family education and to maximize pt's level of independence in the home and community environment.     Pt's spiritual, cultural and educational needs considered and pt agreeable to plan of care and goals.     Anticipated barriers to physical therapy: occupation    Goals:   Short Term Goals:  4 weeks     1. Pain: Pt will demonstrate improved pain by reports of less than or equal to 7/10 worst pain on the verbal rating scale in order to progress toward maximal functional ability and improve QOL.     2. Function: Patient will demonstrate improved function as indicated by a functional status score of less than or equal to 34 out of 100 on FOTO.     3. Mobility: Patient will improve AROM to 50% of stated goals, listed in objective measures above, in order to progress towards independence with functional activities.      4. Strength: Patient will improve strength to 50% of stated goals, listed in objective measures above, in order to progress towards independence with functional activities.      5. HEP: Patient will demonstrate independence with HEP in order to progress toward functional independence.        Long Term Goals:  8 weeks     1. Pain: Pt will demonstrate improved pain by reports of less than or equal to 6/10 worst pain on the verbal rating scale in order to progress toward maximal functional ability and improve QOL.       2. Function: Patient will demonstrate improved function as indicated by a functional status score of less than or equal to 30 out of 100 on FOTO.     3. Mobility: Patient will improve AROM to stated goals, listed in objective measures above, in order to " return to maximal functional potential and improve quality of life.     4. Strength: Patient will improve strength to stated goals, listed in objective measures above, in order to improve functional independence and quality of life.     5. Patient will return to normal ADL's, IADL's, community involvement, recreational activities, and work-related activities with less than or equal to 5/10 pain and maximal function.           PLAN   Continue Plan of Care (POC) and progress per patient tolerance.    Lakisha Espinal, PT, DPT, CSCS, PPCES

## 2020-05-22 ENCOUNTER — OFFICE VISIT (OUTPATIENT)
Dept: OBSTETRICS AND GYNECOLOGY | Facility: CLINIC | Age: 25
End: 2020-05-22
Payer: COMMERCIAL

## 2020-05-22 ENCOUNTER — TELEPHONE (OUTPATIENT)
Dept: OBSTETRICS AND GYNECOLOGY | Facility: CLINIC | Age: 25
End: 2020-05-22

## 2020-05-22 ENCOUNTER — CLINICAL SUPPORT (OUTPATIENT)
Dept: REHABILITATION | Facility: HOSPITAL | Age: 25
End: 2020-05-22
Payer: COMMERCIAL

## 2020-05-22 VITALS
DIASTOLIC BLOOD PRESSURE: 70 MMHG | BODY MASS INDEX: 29.39 KG/M2 | WEIGHT: 149.69 LBS | SYSTOLIC BLOOD PRESSURE: 114 MMHG | HEIGHT: 60 IN

## 2020-05-22 DIAGNOSIS — Z30.41 ENCOUNTER FOR SURVEILLANCE OF CONTRACEPTIVE PILLS: ICD-10-CM

## 2020-05-22 DIAGNOSIS — N92.1 BREAKTHROUGH BLEEDING ON BIRTH CONTROL PILLS: Primary | ICD-10-CM

## 2020-05-22 DIAGNOSIS — Z11.3 SCREEN FOR STD (SEXUALLY TRANSMITTED DISEASE): ICD-10-CM

## 2020-05-22 DIAGNOSIS — M54.2 CERVICALGIA: ICD-10-CM

## 2020-05-22 PROCEDURE — 97140 MANUAL THERAPY 1/> REGIONS: CPT | Performed by: PHYSICAL THERAPIST

## 2020-05-22 PROCEDURE — 99999 PR PBB SHADOW E&M-EST. PATIENT-LVL II: CPT | Mod: PBBFAC,,, | Performed by: OBSTETRICS & GYNECOLOGY

## 2020-05-22 PROCEDURE — 99213 PR OFFICE/OUTPT VISIT, EST, LEVL III, 20-29 MIN: ICD-10-PCS | Mod: S$GLB,,, | Performed by: OBSTETRICS & GYNECOLOGY

## 2020-05-22 PROCEDURE — 99213 OFFICE O/P EST LOW 20 MIN: CPT | Mod: S$GLB,,, | Performed by: OBSTETRICS & GYNECOLOGY

## 2020-05-22 PROCEDURE — 97110 THERAPEUTIC EXERCISES: CPT | Performed by: PHYSICAL THERAPIST

## 2020-05-22 PROCEDURE — 87210 SMEAR WET MOUNT SALINE/INK: CPT | Mod: QW,S$GLB,, | Performed by: OBSTETRICS & GYNECOLOGY

## 2020-05-22 PROCEDURE — 81025 PR  URINE PREGNANCY TEST: ICD-10-PCS | Mod: S$GLB,,, | Performed by: OBSTETRICS & GYNECOLOGY

## 2020-05-22 PROCEDURE — 81025 URINE PREGNANCY TEST: CPT | Mod: S$GLB,,, | Performed by: OBSTETRICS & GYNECOLOGY

## 2020-05-22 PROCEDURE — 87491 CHLMYD TRACH DNA AMP PROBE: CPT

## 2020-05-22 PROCEDURE — 87210 PR  SMEAR,STAIN,WET MNT,INTERP: ICD-10-PCS | Mod: QW,S$GLB,, | Performed by: OBSTETRICS & GYNECOLOGY

## 2020-05-22 PROCEDURE — 99999 PR PBB SHADOW E&M-EST. PATIENT-LVL II: ICD-10-PCS | Mod: PBBFAC,,, | Performed by: OBSTETRICS & GYNECOLOGY

## 2020-05-22 PROCEDURE — 3008F PR BODY MASS INDEX (BMI) DOCUMENTED: ICD-10-PCS | Mod: CPTII,S$GLB,, | Performed by: OBSTETRICS & GYNECOLOGY

## 2020-05-22 PROCEDURE — 3008F BODY MASS INDEX DOCD: CPT | Mod: CPTII,S$GLB,, | Performed by: OBSTETRICS & GYNECOLOGY

## 2020-05-22 RX ORDER — LEVONORGESTREL AND ETHINYL ESTRADIOL 0.15-0.03
KIT ORAL
COMMUNITY
Start: 2020-05-06 | End: 2020-05-22 | Stop reason: SINTOL

## 2020-05-22 RX ORDER — NORGESTIMATE AND ETHINYL ESTRADIOL 0.25-0.035
1 KIT ORAL DAILY
Qty: 28 TABLET | Refills: 11 | Status: SHIPPED | OUTPATIENT
Start: 2020-05-22 | End: 2021-05-04 | Stop reason: SDUPTHER

## 2020-05-22 RX ORDER — ORPHENADRINE CITRATE 100 MG/1
TABLET, EXTENDED RELEASE ORAL
COMMUNITY
Start: 2020-04-02 | End: 2022-06-24

## 2020-05-22 RX ORDER — METRONIDAZOLE 7.5 MG/G
GEL VAGINAL
COMMUNITY
Start: 2020-04-08 | End: 2022-06-24

## 2020-05-22 RX ORDER — CYCLOBENZAPRINE HCL 10 MG
10 TABLET ORAL 2 TIMES DAILY PRN
COMMUNITY
Start: 2020-04-21 | End: 2023-07-05

## 2020-05-22 RX ORDER — GABAPENTIN 300 MG/1
CAPSULE ORAL
COMMUNITY
Start: 2020-04-21 | End: 2023-07-05

## 2020-05-22 RX ORDER — KETOROLAC TROMETHAMINE 10 MG/1
10 TABLET, FILM COATED ORAL EVERY 6 HOURS PRN
COMMUNITY
Start: 2020-04-02 | End: 2022-06-24

## 2020-05-22 NOTE — PROGRESS NOTES
"  Physical Therapy Daily Treatment Note     Name: Saima Da Silva  Clinic Number: [id number]    Therapy Diagnosis:   Encounter Diagnosis   Name Primary?    Cervicalgia      Physician: Bogdan Young MD    Visit Date: 5/22/2020    Physician Orders: PT Eval and Treat  Medical Diagnosis from Referral: Cervicalgia, bilateral posterior neck pain  Evaluation Date: 5/7/2020  Authorization Period Expiration: 7/2/2020  Plan of Care Expiration: 7/2/2020  Visit # / Visits authorized: 3/ 20     Precautions: Standard    Time In: 7:45 am  Time Out: 8:30am  Total Billable Time: 45 minutes    SUBJECTIVE     Pt reports: that she is having a little better day, but she was hurting yesterday. She had to leave work since her back pain was so bad; however, she is better today. She thinks it may have been from standing all day and having a busy week.     She was compliant with home exercise program.  Response to previous treatment: Decrease in pain symptoms  Functional change: Sore from muscle fatigue    Pain: Minimal/10  Location: upper back and neck      TREATMENT     Saima received therapeutic exercises to develop strength, endurance, ROM, flexibility, posture and core stabilization for 35 minutes including:    Exercise 5/22/2020   UBE 3'/3'   Pec Stretch 1' x 3   UT stretch 3 x 30" holds   Chin Tucks 10 x 10" holds   Cervical retraction with rotation 10x each direction   Series 6 2 x 10 each   Prone Rows 2 x 10 each side   Prone T's and I's 2 x 10 each side   Bilateral ER 2 x 10, red theraband   Scapular Retractions 3 x 10, green theraband   Shoulder Extensions  3 x 10, red theraband    x = exercise details same as prior session        Saima received the following manual therapy techniques: Joint mobilizations, Myofacial release and Soft tissue Mobilization were applied to the neck and upper back for 10 minutes, including:  Soft tissue mobilization to bilateral thoracic paraspinal muscles  P/A mobilizations applied to the upper " thoracic segments  Myofascial release to bilateral upper trapezius muscles  Myofascial release and soft tissue mobilization was applied to the cervical paraspinals    Home Exercises Provided and Patient Education Provided     Education provided:    Patient educated on the impairments noted above and the effects of physical therapy intervention to improve overall condition and QOL.    Patient educated on biomechanical justification for therapeutic exercise and importance of compliance with HEP in order to improve overall impairments and QOL    Patient educated on postural awareness and the use of a lumbar roll when in a seated position to reduce stress and maintain optimal alignment of the spine.    Patient educated on proper ergonomics at the work station in order to maintain optimal alignment of the musculoskeletal system and improve efficiency in the work environment.   Patient educated on the importance of improved core and hip strength in order to improve alignment of the spine and lower extremities with static positions and dynamic movement.    Patient educated on the importance of strong core and lower extremity musculature in order to improve both static and dynamic balance, improve gait mechanics, reduce fall risk and improve household and community mobility.     Written Home Exercises Provided: Patient instructed to cont prior HEP as well as to add the recommended exercises initiated today.  Exercises were reviewed and Saima was able to demonstrate them prior to the end of the session.  Saima demonstrated good  understanding of the education provided.     See EMR under Patient Instructions for exercises provided 5/11/2020.    ASSESSMENT     Patient tolerated treatment well. She completed exercises with a little less difficulty this visit and was able to be progressed without issue. Patient still demonstrates scapular weakness and fatigue but does demonstrate improvement in strength with exercise. Good  form noted, and only minimal verbal and tactile cues required throughout treatment. Good relief reported with manual therapy.     Saima is progressing well towards her goals.   Pt prognosis is Good.     Pt will continue to benefit from skilled outpatient physical therapy to address the deficits listed in the problem list box on initial evaluation, provide pt/family education and to maximize pt's level of independence in the home and community environment.     Pt's spiritual, cultural and educational needs considered and pt agreeable to plan of care and goals.     Anticipated barriers to physical therapy: occupation    Goals:   Short Term Goals:  4 weeks     1. Pain: Pt will demonstrate improved pain by reports of less than or equal to 7/10 worst pain on the verbal rating scale in order to progress toward maximal functional ability and improve QOL.     2. Function: Patient will demonstrate improved function as indicated by a functional status score of less than or equal to 34 out of 100 on FOTO.     3. Mobility: Patient will improve AROM to 50% of stated goals, listed in objective measures above, in order to progress towards independence with functional activities.      4. Strength: Patient will improve strength to 50% of stated goals, listed in objective measures above, in order to progress towards independence with functional activities.      5. HEP: Patient will demonstrate independence with HEP in order to progress toward functional independence.        Long Term Goals:  8 weeks     1. Pain: Pt will demonstrate improved pain by reports of less than or equal to 6/10 worst pain on the verbal rating scale in order to progress toward maximal functional ability and improve QOL.       2. Function: Patient will demonstrate improved function as indicated by a functional status score of less than or equal to 30 out of 100 on FOTO.     3. Mobility: Patient will improve AROM to stated goals, listed in objective measures  above, in order to return to maximal functional potential and improve quality of life.     4. Strength: Patient will improve strength to stated goals, listed in objective measures above, in order to improve functional independence and quality of life.     5. Patient will return to normal ADL's, IADL's, community involvement, recreational activities, and work-related activities with less than or equal to 5/10 pain and maximal function.           PLAN   Continue Plan of Care (POC) and progress per patient tolerance.    Micaela Meeks, PT, DPT

## 2020-05-22 NOTE — TELEPHONE ENCOUNTER
Called and spoke to patient. Pt. States someone had already called and spoke with her. Made sure all her questions were answered.

## 2020-05-22 NOTE — PROGRESS NOTES
Subjective:       Patient ID: Saima Da Silva is a 25 y.o. female.    Chief Complaint:  Vaginal Bleeding      History of Present Illness  HPI  Pt complains of persistent vaginal spotting for the past 2 months.  Pt was started on continuous OCP last year for contraception and treatment of irregular menses.  Pt is sexually active.  Would like to discuss options.    GYN & OB History  Patient's last menstrual period was 2020.   Date of Last Pap: 2018    OB History    Para Term  AB Living   0 0 0 0 0 0   SAB TAB Ectopic Multiple Live Births   0 0 0 0         Review of Systems  Review of Systems   Constitutional: Negative for activity change, appetite change, chills, fatigue, fever and unexpected weight change.   Respiratory: Negative for shortness of breath.    Cardiovascular: Negative for chest pain, palpitations and leg swelling.   Gastrointestinal: Negative for abdominal pain, bloating, blood in stool, constipation, diarrhea, nausea and vomiting.   Genitourinary: Positive for menstrual problem and vaginal bleeding. Negative for dysmenorrhea, dyspareunia, dysuria, flank pain, frequency, genital sores, hematuria, menorrhagia, pelvic pain, urgency, vaginal discharge, vaginal pain, urinary incontinence, vaginal dryness and vaginal odor.   Musculoskeletal: Negative for back pain.   Neurological: Negative for syncope and headaches.           Objective:    Physical Exam:   Constitutional: She is oriented to person, place, and time. She appears well-developed and well-nourished. No distress.       Cardiovascular: Normal rate and regular rhythm.     Pulmonary/Chest: Effort normal and breath sounds normal.        Abdominal: Soft. Bowel sounds are normal. She exhibits no distension. There is no tenderness.     Genitourinary: Uterus normal. Pelvic exam was performed with patient supine. There is no rash, tenderness, lesion or injury on the right labia. There is no rash, tenderness, lesion or injury on the  left labia. Uterus is not deviated, not enlarged and not tender. Cervix is normal. Right adnexum displays no mass, no tenderness and no fullness. Left adnexum displays no mass, no tenderness and no fullness. There is bleeding (scant amount of old blood in vault) in the vagina. No erythema or tenderness in the vagina. No foreign body in the vagina. No signs of injury around the vagina. No vaginal discharge found. Cervix exhibits no motion tenderness, no discharge and no friability.   Genitourinary Comments: UPT today Negative  Wet prep: negative for trichomonas, yeast, or clue cells           Musculoskeletal: Normal range of motion and moves all extremeties. She exhibits no edema or tenderness.       Neurological: She is alert and oriented to person, place, and time.    Skin: Skin is warm and dry.    Psychiatric: She has a normal mood and affect. Her behavior is normal. Thought content normal.          Assessment:        1. Breakthrough bleeding on birth control pills    2. Encounter for surveillance of contraceptive pills    3. Screen for STD (sexually transmitted disease)             Plan:      Breakthrough bleeding on birth control pills  -     POCT urine pregnancy  -     Pt was counseled on options.  Desires to switch to alternate OCP.    Encounter for surveillance of contraceptive pills  -     norgestimate-ethinyl estradioL (ORTHO-CYCLEN) 0.25-35 mg-mcg per tablet; Take 1 tablet by mouth once daily.  Dispense: 28 tablet; Refill: 11    Screen for STD (sexually transmitted disease)  -     C. trachomatis/N. gonorrhoeae by AMP DNA  -     POCT Wet Prep      Follow up if symptoms worsen or fail to improve.

## 2020-05-22 NOTE — TELEPHONE ENCOUNTER
----- Message from Mervat Barrios sent at 5/22/2020  7:23 AM CDT -----  Contact: self/[phone]  Would like to consult with nurse regarding her appt today, patient states she has some concerns, please call back at [phone]. Thanks/ar

## 2020-05-26 LAB
C TRACH DNA SPEC QL NAA+PROBE: NOT DETECTED
N GONORRHOEA DNA SPEC QL NAA+PROBE: NOT DETECTED

## 2020-06-04 ENCOUNTER — CLINICAL SUPPORT (OUTPATIENT)
Dept: REHABILITATION | Facility: HOSPITAL | Age: 25
End: 2020-06-04
Payer: COMMERCIAL

## 2020-06-04 DIAGNOSIS — M54.2 CERVICALGIA: ICD-10-CM

## 2020-06-04 PROCEDURE — 97140 MANUAL THERAPY 1/> REGIONS: CPT | Performed by: PHYSICAL THERAPIST

## 2020-06-04 PROCEDURE — 97110 THERAPEUTIC EXERCISES: CPT | Performed by: PHYSICAL THERAPIST

## 2020-06-04 NOTE — PROGRESS NOTES
"  Physical Therapy Daily Treatment Note     Name: Saima Da Silva  Clinic Number: [id number]    Therapy Diagnosis:   Encounter Diagnosis   Name Primary?    Cervicalgia      Physician: Bogdan Young MD    Visit Date: 6/4/2020    Physician Orders: PT Eval and Treat  Medical Diagnosis from Referral: Cervicalgia, bilateral posterior neck pain  Evaluation Date: 5/7/2020  Authorization Period Expiration: 7/2/2020  Plan of Care Expiration: 7/2/2020  Visit # / Visits authorized: 4/ 20     Precautions: Standard    Time In: 1:00pm  Time Out: 1:45pm  Total Billable Time: 45 minutes    SUBJECTIVE     Pt reports: that she is feeling much better. She states that she does not have any pain today, and she has not been having as much pain throughout her work day.     She was compliant with home exercise program.  Response to previous treatment: Decrease in pain symptoms  Functional change: Sore from muscle fatigue    Pain: 0/10  Location: upper back and neck      TREATMENT     Saima received therapeutic exercises to develop strength, endurance, ROM, flexibility, posture and core stabilization for 35 minutes including:    Exercise 6/4/2020   UBE 3'/3'   UT stretch 3 x 30" holds   Chin Tucks 10 x 10" holds   Cervical retraction with rotation 10x each direction   Series 6 2 x 10 each, 3' pec stretch   Prone Rows 2 x 10 each side   Prone T's and I's 2 x 10 each side   Bilateral ER 2 x 10, red theraband   Scapular Retractions 3 x 10, green theraband   Shoulder Extensions  3 x 10, red theraband    x = exercise details same as prior session        Saima received the following manual therapy techniques: Joint mobilizations, Myofacial release and Soft tissue Mobilization were applied to the neck and upper back for 10 minutes, including:  Soft tissue mobilization to bilateral thoracic paraspinal muscles  P/A mobilizations applied to the upper thoracic segments  Myofascial release to bilateral upper trapezius muscles  Myofascial release and " soft tissue mobilization was applied to the cervical paraspinals    Home Exercises Provided and Patient Education Provided     Education provided:    Patient educated on the impairments noted above and the effects of physical therapy intervention to improve overall condition and QOL.    Patient educated on biomechanical justification for therapeutic exercise and importance of compliance with HEP in order to improve overall impairments and QOL    Patient educated on postural awareness and the use of a lumbar roll when in a seated position to reduce stress and maintain optimal alignment of the spine.    Patient educated on proper ergonomics at the work station in order to maintain optimal alignment of the musculoskeletal system and improve efficiency in the work environment.   Patient educated on the importance of improved core and hip strength in order to improve alignment of the spine and lower extremities with static positions and dynamic movement.    Patient educated on the importance of strong core and lower extremity musculature in order to improve both static and dynamic balance, improve gait mechanics, reduce fall risk and improve household and community mobility.     Written Home Exercises Provided: Patient instructed to cont prior HEP as well as to add the recommended exercises initiated today.  Exercises were reviewed and Saima was able to demonstrate them prior to the end of the session.  Saima demonstrated good  understanding of the education provided.     See EMR under Patient Instructions for exercises provided 5/11/2020.    ASSESSMENT     Patient did well with treatment today. She completed exercises without difficulty or complaint. Decreased soft tissue adhesions and decreased muscle guarding noted today during manual therapy. Improved thoracic mobility. Patient is progressing very well towards goals.     Saima is progressing well towards her goals.   Pt prognosis is Good.     Pt will continue to  benefit from skilled outpatient physical therapy to address the deficits listed in the problem list box on initial evaluation, provide pt/family education and to maximize pt's level of independence in the home and community environment.     Pt's spiritual, cultural and educational needs considered and pt agreeable to plan of care and goals.     Anticipated barriers to physical therapy: occupation    Goals:   Short Term Goals:  4 weeks     1. Pain: Pt will demonstrate improved pain by reports of less than or equal to 7/10 worst pain on the verbal rating scale in order to progress toward maximal functional ability and improve QOL.     2. Function: Patient will demonstrate improved function as indicated by a functional status score of less than or equal to 34 out of 100 on FOTO.     3. Mobility: Patient will improve AROM to 50% of stated goals, listed in objective measures above, in order to progress towards independence with functional activities.      4. Strength: Patient will improve strength to 50% of stated goals, listed in objective measures above, in order to progress towards independence with functional activities.      5. HEP: Patient will demonstrate independence with HEP in order to progress toward functional independence.        Long Term Goals:  8 weeks     1. Pain: Pt will demonstrate improved pain by reports of less than or equal to 6/10 worst pain on the verbal rating scale in order to progress toward maximal functional ability and improve QOL.       2. Function: Patient will demonstrate improved function as indicated by a functional status score of less than or equal to 30 out of 100 on FOTO.     3. Mobility: Patient will improve AROM to stated goals, listed in objective measures above, in order to return to maximal functional potential and improve quality of life.     4. Strength: Patient will improve strength to stated goals, listed in objective measures above, in order to improve functional  independence and quality of life.     5. Patient will return to normal ADL's, IADL's, community involvement, recreational activities, and work-related activities with less than or equal to 5/10 pain and maximal function.           PLAN   Continue Plan of Care (POC) and progress per patient tolerance.    Micaela Meeks, PT, DPT

## 2020-07-01 ENCOUNTER — CLINICAL SUPPORT (OUTPATIENT)
Dept: REHABILITATION | Facility: HOSPITAL | Age: 25
End: 2020-07-01
Payer: COMMERCIAL

## 2020-07-01 DIAGNOSIS — M54.2 CERVICALGIA: ICD-10-CM

## 2020-07-01 PROCEDURE — 97140 MANUAL THERAPY 1/> REGIONS: CPT | Performed by: PHYSICAL THERAPIST

## 2020-07-01 PROCEDURE — 97112 NEUROMUSCULAR REEDUCATION: CPT | Performed by: PHYSICAL THERAPIST

## 2020-07-01 PROCEDURE — 97110 THERAPEUTIC EXERCISES: CPT | Performed by: PHYSICAL THERAPIST

## 2020-07-01 NOTE — PROGRESS NOTES
Physical Therapy Daily Treatment Note     Name: Saima Da Silva  Clinic Number: [id number]    Therapy Diagnosis:   Encounter Diagnosis   Name Primary?    Cervicalgia      Physician: Bogdan Young MD    Visit Date: 7/1/2020    Physician Orders: PT Eval and Treat  Medical Diagnosis from Referral: Cervicalgia, bilateral posterior neck pain  Evaluation Date: 5/7/2020  Authorization Period Expiration: 12/31/2020  Plan of Care Expiration: 9/29/2020  Visit # / Visits authorized: 5/ 20     Precautions: Standard    Time In: 12:15pm  Time Out: 1:05pm  Total Billable Time: 45 minutes    SUBJECTIVE     Pt reports: that she is still feeling much better overall. She feels a little stiff today, but she would not call it pain. Patient feels PT is helping and would like to attend a few more visits to make sure she reaches her max potential.     She was compliant with home exercise program.  Response to previous treatment: Decrease in pain symptoms  Functional change: Sore from muscle fatigue    Pain: 0/10  Location: upper back and neck      OBJECTIVE   (x = not tested due to pain and/or inability to obtain test position)     RANGE OF MOTION:     Cervical Right   (spine)  5/7/2020 Left      5/7/2020 Pain/Dysfunction with Movement Goal Right   (spine)  7/1/2020  Left  7/1/2020    Cervical Flexion (60) 40 ---    60 50 ---   Cervical Extension (90) 40 --- pain  50 45 ---   Cervical Side Bending (45) 40 35 Tightness  40 40 40   Cervical Rotation (75) 60 65    65 60 65      STRENGTH:     U/E MMT Right  5/7/2020 Left  5/7/2020 Goal Right   7/1/2020  Left  7/1/2020    Shoulder Flexion 5/5 5/5 5/5 B 5 5   Shoulder Extension 5/5 5/5 5/5 B 5 5   Shoulder Abduction 5/5 5/5 5/5 B 5 5   Shoulder Adduction 5/5 5/5 5/5 B 5 5   Shoulder IR 5/5 5/5 5/5 B 5 5   Shoulder ER    4/5 4/5 5/5 B 4+ 4+   Middle Trapezius    4/5 4/5 5/5 B 4 4   Lower Trapezius 4/5 4/5 5/5 B 4 4        MUSCLE LENGTH:      Muscle Tested  Right  5/7/2020 Left   5/7/2020  "Goal   Upper Trapezius  decreased decreased Normal B    Pectoralis Minor  decreased decreased Normal B   Pectoralis Major decreased decreased Normal B   **7/1/2020: Although still decreased, muscle length has improved as compared to initial visits.      JOINT MOBILITY:      Joint Motion Tested Spine  5/7/2020 Spine  7/1/2020 Goal   T4/5-T6/7 P/A Hypomobile Hypomobile but improved as compared to IE  Normal B      Sensation:  Sensation is intact to light touch     Palpation: Increased tone and tenderness noted with palpation of bilateral cervical paraspinals, upper trapezius and thoracic paraspinals, but decreased as compared to IE.     Posture:  Pt presents with postural abnormalities which include: forward head     Movement Analysis: Unremarkable     Function:        TREATMENT     Saima received therapeutic exercises to develop strength, endurance, ROM, flexibility, posture and core stabilization for 15 minutes including:    Exercise 7/1/2020   UBE 3'/3' - deferred   UT stretch 3 x 30" holds - deferred   Chin Tucks 10 x 10" holds - deferred   Ball on wall - flx/ext, rot 2 x 10 each direction   Bilateral ER 2 x 10, red theraband   Scapular Retractions 3 x 10, green theraband   Shoulder Extensions  3 x 10, green theraband    x = exercise details same as prior session      Saima participated in neuromuscular re-education activities to improve: Posture for 20 minutes. The following activities were included:    Exercise 7/1/2020   Series 6 2 x 10 each, 3' pec stretch   Prone Rows 2 x 10 each side, deferred today   Prone T's and I's 2 x 10 each side, deferred today   Wall clock in plank position with red theraband around wrists  8x each UE                   x = exercise details same as prior session     Saima received the following manual therapy techniques: Joint mobilizations, Myofacial release and Soft tissue Mobilization were applied to the neck and upper back for 10 minutes, including:  Soft tissue mobilization to " bilateral thoracic paraspinal muscles  P/A mobilizations applied to the upper thoracic segments  Myofascial release to bilateral upper trapezius muscles        Written Home Exercises Provided: Patient instructed to cont prior HEP as well as to add the recommended exercises initiated today.  Exercises were reviewed and Saima was able to demonstrate them prior to the end of the session.  Saima demonstrated good  understanding of the education provided.     See EMR under Patient Instructions for exercises provided 5/11/2020.    ASSESSMENT     Patient tolerated treatment well and has attended 5 total PT visits. She has made good overall progress with PT, demonstrating improvements in ROM, UE strength, and postural stability. Her pain levels have decreased, and she is returning to more functional activities. Decreased muscle tightness, but some tension and soft tissue adhesions still present, especially along bilateral upper trapezius and periscapular mm. Improved thoracic spine mobility noted as well. Patient would benefit from continuing with her approved visits in order to address remaining limitations in postural strength and stabilization as well as to reach m ax functional potential.     Saima is progressing well towards her goals.   Pt prognosis is Good.     Pt will continue to benefit from skilled outpatient physical therapy to address the deficits listed in the problem list box on initial evaluation, provide pt/family education and to maximize pt's level of independence in the home and community environment.     Pt's spiritual, cultural and educational needs considered and pt agreeable to plan of care and goals.     Anticipated barriers to physical therapy: occupation    Goals:   Short Term Goals:  4 weeks  Status   1. Pain: Pt will demonstrate improved pain by reports of less than or equal to 7/10 worst pain on the verbal rating scale in order to progress toward maximal functional ability and improve QOL.  Met    2. Function: Patient will demonstrate improved function as indicated by a functional status score of less than or equal to 34 out of 100 on FOTO.  Met   3. Mobility: Patient will improve AROM to 50% of stated goals, listed in objective measures above, in order to progress towards independence with functional activities.   Met   4. Strength: Patient will improve strength to 50% of stated goals, listed in objective measures above, in order to progress towards independence with functional activities.   Met   5. HEP: Patient will demonstrate independence with HEP in order to progress toward functional independence.  Met        Long Term Goals:  8 weeks  Status   1. Pain: Pt will demonstrate improved pain by reports of less than or equal to 6/10 worst pain on the verbal rating scale in order to progress toward maximal functional ability and improve QOL.    Met   2. Function: Patient will demonstrate improved function as indicated by a functional status score of less than or equal to 30 out of 100 on FOTO.  Met   3. Mobility: Patient will improve AROM to stated goals, listed in objective measures above, in order to return to maximal functional potential and improve quality of life.  Partially Met   4. Strength: Patient will improve strength to stated goals, listed in objective measures above, in order to improve functional independence and quality of life.  Partially Met   5. Patient will return to normal ADL's, IADL's, community involvement, recreational activities, and work-related activities with less than or equal to 5/10 pain and maximal function.   Met        PLAN   Continue Plan of Care (POC) and progress per patient tolerance.    Updated Certification Period: 7/1/2020 to 9/29/2020  Other Recommendations: It is my recommendation that patient continue with PT at her current frequency of 2 times per week for the remainder of her approved visits. Her treatment plan will remain the same, and she will be progressed  appropriately.       Micaela Meeks, PT, DPT

## 2020-07-01 NOTE — PLAN OF CARE
Physical Therapy Daily Treatment Note     Name: Saima Da Silva  Clinic Number: [id number]    Therapy Diagnosis:   Encounter Diagnosis   Name Primary?    Cervicalgia      Physician: Bogdan Young MD    Visit Date: 7/1/2020    Physician Orders: PT Eval and Treat  Medical Diagnosis from Referral: Cervicalgia, bilateral posterior neck pain  Evaluation Date: 5/7/2020  Authorization Period Expiration: 12/31/2020  Plan of Care Expiration: 9/29/2020  Visit # / Visits authorized: 5/ 20     Precautions: Standard    Time In: 12:15pm  Time Out: 1:05pm  Total Billable Time: 45 minutes    SUBJECTIVE     Pt reports: that she is still feeling much better overall. She feels a little stiff today, but she would not call it pain. Patient feels PT is helping and would like to attend a few more visits to make sure she reaches her max potential.     She was compliant with home exercise program.  Response to previous treatment: Decrease in pain symptoms  Functional change: Sore from muscle fatigue    Pain: 0/10  Location: upper back and neck      OBJECTIVE   (x = not tested due to pain and/or inability to obtain test position)     RANGE OF MOTION:     Cervical Right   (spine)  5/7/2020 Left      5/7/2020 Pain/Dysfunction with Movement Goal Right   (spine)  7/1/2020  Left  7/1/2020    Cervical Flexion (60) 40 ---    60 50 ---   Cervical Extension (90) 40 --- pain  50 45 ---   Cervical Side Bending (45) 40 35 Tightness  40 40 40   Cervical Rotation (75) 60 65    65 60 65      STRENGTH:     U/E MMT Right  5/7/2020 Left  5/7/2020 Goal Right   7/1/2020  Left  7/1/2020    Shoulder Flexion 5/5 5/5 5/5 B 5 5   Shoulder Extension 5/5 5/5 5/5 B 5 5   Shoulder Abduction 5/5 5/5 5/5 B 5 5   Shoulder Adduction 5/5 5/5 5/5 B 5 5   Shoulder IR 5/5 5/5 5/5 B 5 5   Shoulder ER    4/5 4/5 5/5 B 4+ 4+   Middle Trapezius    4/5 4/5 5/5 B 4 4   Lower Trapezius 4/5 4/5 5/5 B 4 4        MUSCLE LENGTH:      Muscle Tested  Right  5/7/2020 Left   5/7/2020  "Goal   Upper Trapezius  decreased decreased Normal B    Pectoralis Minor  decreased decreased Normal B   Pectoralis Major decreased decreased Normal B   **7/1/2020: Although still decreased, muscle length has improved as compared to initial visits.      JOINT MOBILITY:      Joint Motion Tested Spine  5/7/2020 Spine  7/1/2020 Goal   T4/5-T6/7 P/A Hypomobile Hypomobile but improved as compared to IE  Normal B      Sensation:  Sensation is intact to light touch     Palpation: Increased tone and tenderness noted with palpation of bilateral cervical paraspinals, upper trapezius and thoracic paraspinals, but decreased as compared to IE.     Posture:  Pt presents with postural abnormalities which include: forward head     Movement Analysis: Unremarkable     Function:        TREATMENT     Saima received therapeutic exercises to develop strength, endurance, ROM, flexibility, posture and core stabilization for 15 minutes including:    Exercise 7/1/2020   UBE 3'/3' - deferred   UT stretch 3 x 30" holds - deferred   Chin Tucks 10 x 10" holds - deferred   Ball on wall - flx/ext, rot 2 x 10 each direction   Bilateral ER 2 x 10, red theraband   Scapular Retractions 3 x 10, green theraband   Shoulder Extensions  3 x 10, green theraband    x = exercise details same as prior session      Saima participated in neuromuscular re-education activities to improve: Posture for 20 minutes. The following activities were included:    Exercise 7/1/2020   Series 6 2 x 10 each, 3' pec stretch   Prone Rows 2 x 10 each side, deferred today   Prone T's and I's 2 x 10 each side, deferred today   Wall clock in plank position with red theraband around wrists  8x each UE                   x = exercise details same as prior session     Saima received the following manual therapy techniques: Joint mobilizations, Myofacial release and Soft tissue Mobilization were applied to the neck and upper back for 10 minutes, including:  Soft tissue mobilization to " bilateral thoracic paraspinal muscles  P/A mobilizations applied to the upper thoracic segments  Myofascial release to bilateral upper trapezius muscles        Written Home Exercises Provided: Patient instructed to cont prior HEP as well as to add the recommended exercises initiated today.  Exercises were reviewed and Saima was able to demonstrate them prior to the end of the session.  Saima demonstrated good  understanding of the education provided.     See EMR under Patient Instructions for exercises provided 5/11/2020.    ASSESSMENT     Patient tolerated treatment well and has attended 5 total PT visits. She has made good overall progress with PT, demonstrating improvements in ROM, UE strength, and postural stability. Her pain levels have decreased, and she is returning to more functional activities. Decreased muscle tightness, but some tension and soft tissue adhesions still present, especially along bilateral upper trapezius and periscapular mm. Improved thoracic spine mobility noted as well. Patient would benefit from continuing with her approved visits in order to address remaining limitations in postural strength and stabilization as well as to reach m ax functional potential.     Saima is progressing well towards her goals.   Pt prognosis is Good.     Pt will continue to benefit from skilled outpatient physical therapy to address the deficits listed in the problem list box on initial evaluation, provide pt/family education and to maximize pt's level of independence in the home and community environment.     Pt's spiritual, cultural and educational needs considered and pt agreeable to plan of care and goals.     Anticipated barriers to physical therapy: occupation    Goals:   Short Term Goals:  4 weeks  Status   1. Pain: Pt will demonstrate improved pain by reports of less than or equal to 7/10 worst pain on the verbal rating scale in order to progress toward maximal functional ability and improve QOL.  Met    2. Function: Patient will demonstrate improved function as indicated by a functional status score of less than or equal to 34 out of 100 on FOTO.  Met   3. Mobility: Patient will improve AROM to 50% of stated goals, listed in objective measures above, in order to progress towards independence with functional activities.   Met   4. Strength: Patient will improve strength to 50% of stated goals, listed in objective measures above, in order to progress towards independence with functional activities.   Met   5. HEP: Patient will demonstrate independence with HEP in order to progress toward functional independence.  Met        Long Term Goals:  8 weeks  Status   1. Pain: Pt will demonstrate improved pain by reports of less than or equal to 6/10 worst pain on the verbal rating scale in order to progress toward maximal functional ability and improve QOL.    Met   2. Function: Patient will demonstrate improved function as indicated by a functional status score of less than or equal to 30 out of 100 on FOTO.  Met   3. Mobility: Patient will improve AROM to stated goals, listed in objective measures above, in order to return to maximal functional potential and improve quality of life.  Partially Met   4. Strength: Patient will improve strength to stated goals, listed in objective measures above, in order to improve functional independence and quality of life.  Partially Met   5. Patient will return to normal ADL's, IADL's, community involvement, recreational activities, and work-related activities with less than or equal to 5/10 pain and maximal function.   Met        PLAN   Continue Plan of Care (POC) and progress per patient tolerance.    Updated Certification Period: 7/1/2020 to 9/29/2020  Other Recommendations: It is my recommendation that patient continue with PT at her current frequency of 2 times per week for the remainder of her approved visits. Her treatment plan will remain the same, and she will be progressed  appropriately.       Micaela Meeks, PT, DPT

## 2020-07-07 ENCOUNTER — CLINICAL SUPPORT (OUTPATIENT)
Dept: REHABILITATION | Facility: HOSPITAL | Age: 25
End: 2020-07-07
Payer: COMMERCIAL

## 2020-07-07 DIAGNOSIS — M54.2 CERVICALGIA: ICD-10-CM

## 2020-07-07 PROCEDURE — 97110 THERAPEUTIC EXERCISES: CPT | Performed by: PHYSICAL THERAPIST

## 2020-07-07 PROCEDURE — 97140 MANUAL THERAPY 1/> REGIONS: CPT | Performed by: PHYSICAL THERAPIST

## 2020-07-07 PROCEDURE — 97112 NEUROMUSCULAR REEDUCATION: CPT | Performed by: PHYSICAL THERAPIST

## 2020-07-07 NOTE — PROGRESS NOTES
"  Physical Therapy Daily Treatment Note     Name: Saima Da Silva  Clinic Number: [id number]    Therapy Diagnosis:   Encounter Diagnosis   Name Primary?    Cervicalgia      Physician: Bogdan Young MD    Visit Date: 7/7/2020    Physician Orders: PT Eval and Treat  Medical Diagnosis from Referral: Cervicalgia, bilateral posterior neck pain  Evaluation Date: 5/7/2020  Authorization Period Expiration: 12/31/2020  Plan of Care Expiration: 9/29/2020  Visit # / Visits authorized: 6/ 20     Precautions: Standard    Time In: 7:45am  Time Out: 8:30am  Total Billable Time: 45 minutes    SUBJECTIVE     Pt reports: that she continues to feel better overall. She reports mainly still occasional stiffness rather than pain. Patient is able to go throughout her work day with only minimal stiffness now and not the constant stiffness and pain.     She was compliant with home exercise program.  Response to previous treatment: Decrease in pain symptoms  Functional change: Patient reports that she no longer has stiffness and pain throughout her entire day at work, and she can tilt her head back like when drinking water without pain. She still feels stiffness at night when lying on her back, but no pain.     Pain: 0/10  Location: upper back and neck          TREATMENT     Saima received therapeutic exercises to develop strength, endurance, ROM, flexibility, posture and core stabilization for 15 minutes including:    Exercise 7/7/2020   UBE 3'/3'    UT stretch 3 x 30" holds - deferred   Chin Tucks 10 x 10" holds - deferred   Ball on wall - flx/ext, rot 2 x 10 each direction   Bilateral ER 2 x 10, red theraband   Scapular Retractions 3 x 10, green theraband   Shoulder Extensions  3 x 10, green theraband    x = exercise details same as prior session      Saima participated in neuromuscular re-education activities to improve: Posture for 20 minutes. The following activities were included:    Exercise 7/7/2020   Series 6 2 x 10 each, 3' pec " stretch   Prone Rows 2 x 10 each side, deferred today   Prone T's and I's 2 x 10 each side, deferred today   Wall clock in plank position with red theraband around wrists  8x each UE                   x = exercise details same as prior session     Saima received the following manual therapy techniques: Joint mobilizations, Myofacial release and Soft tissue Mobilization were applied to the neck and upper back for 10 minutes, including:  Soft tissue mobilization to bilateral thoracic paraspinal muscles  P/A mobilizations applied to the upper thoracic segments  Myofascial release to bilateral upper trapezius muscles        Written Home Exercises Provided: Patient instructed to cont prior HEP as well as to add the recommended exercises initiated today.  Exercises were reviewed and Saima was able to demonstrate them prior to the end of the session.  Saima demonstrated good  understanding of the education provided.     See EMR under Patient Instructions for exercises provided 5/11/2020.    ASSESSMENT     Patient did well with treatment today. She completed exercises without difficulty or complaint. Decreased difficulty noted with progression from last visit. Her postural strength and stability continue to improved. Improved thoracic spine mobility as well. Good relief reported post treatment, especially with manual therapy.     Saima is progressing well towards her goals.   Pt prognosis is Good.     Pt will continue to benefit from skilled outpatient physical therapy to address the deficits listed in the problem list box on initial evaluation, provide pt/family education and to maximize pt's level of independence in the home and community environment.     Pt's spiritual, cultural and educational needs considered and pt agreeable to plan of care and goals.     Anticipated barriers to physical therapy: occupation    Goals:   Short Term Goals:  4 weeks  Status   1. Pain: Pt will demonstrate improved pain by reports of less than  or equal to 7/10 worst pain on the verbal rating scale in order to progress toward maximal functional ability and improve QOL.  Met   2. Function: Patient will demonstrate improved function as indicated by a functional status score of less than or equal to 34 out of 100 on FOTO.  Met   3. Mobility: Patient will improve AROM to 50% of stated goals, listed in objective measures above, in order to progress towards independence with functional activities.   Met   4. Strength: Patient will improve strength to 50% of stated goals, listed in objective measures above, in order to progress towards independence with functional activities.   Met   5. HEP: Patient will demonstrate independence with HEP in order to progress toward functional independence.  Met        Long Term Goals:  8 weeks  Status   1. Pain: Pt will demonstrate improved pain by reports of less than or equal to 6/10 worst pain on the verbal rating scale in order to progress toward maximal functional ability and improve QOL.    Met   2. Function: Patient will demonstrate improved function as indicated by a functional status score of less than or equal to 30 out of 100 on FOTO.  Met   3. Mobility: Patient will improve AROM to stated goals, listed in objective measures above, in order to return to maximal functional potential and improve quality of life.  Partially Met   4. Strength: Patient will improve strength to stated goals, listed in objective measures above, in order to improve functional independence and quality of life.  Partially Met   5. Patient will return to normal ADL's, IADL's, community involvement, recreational activities, and work-related activities with less than or equal to 5/10 pain and maximal function.   Met        PLAN   Continue Plan of Care (POC) and progress per patient tolerance.    Updated Certification Period: 7/1/2020 to 9/29/2020  Other Recommendations: It is my recommendation that patient continue with PT at her current frequency  of 2 times per week for the remainder of her approved visits. Her treatment plan will remain the same, and she will be progressed appropriately.       Micaela Meeks, PT, DPT

## 2020-07-14 ENCOUNTER — NURSE TRIAGE (OUTPATIENT)
Dept: ADMINISTRATIVE | Facility: CLINIC | Age: 25
End: 2020-07-14

## 2020-07-14 NOTE — TELEPHONE ENCOUNTER
Works on [facility] floor at [employer] on [address]. Pt was with grandmother that tested positive last week. Pt now having sore throat, cough and loss of taste. Informed manager of symptoms. Transferred pt to  for testing information.     Reason for Disposition   [1] COVID-19 infection diagnosed or suspected AND [2] mild symptoms (fever, cough) AND [3] no trouble breathing or other complications    Additional Information   Negative: SEVERE difficulty breathing (e.g., struggling for each breath, speaks in single words)   Negative: Difficult to awaken or acting confused (e.g., disoriented, slurred speech)   Negative: Bluish (or gray) lips or face now   Negative: Shock suspected (e.g., cold/pale/clammy skin, too weak to stand, low BP, rapid pulse)   Negative: Sounds like a life-threatening emergency to the triager   Negative: [1] COVID-19 exposure AND [2] NO symptoms   Negative: COVID-19 and Breastfeeding, questions about   Negative: [1] Adult with possible COVID-19 symptoms AND [2] triager concerned about severity of symptoms or other causes   Negative: SEVERE or constant chest pain or pressure (Exception: mild central chest pain, present only when coughing)   Negative: MODERATE difficulty breathing (e.g., speaks in phrases, SOB even at rest, pulse 100-120)   Negative: MILD difficulty breathing (e.g., minimal/no SOB at rest, SOB with walking, pulse <100)   Negative: Chest pain   Negative: Patient sounds very sick or weak to the triager   Negative: Severe difficulty breathing (e.g., struggling for each breath, speaks in single words)   Negative: Difficult to awaken or acting confused (e.g., disoriented, slurred speech)   Negative: Bluish (or gray) lips or face now   Negative: Shock suspected (e.g., cold/pale/clammy skin, too weak to stand, low BP, rapid pulse)   Negative: Sounds like a life-threatening emergency to the triager   Negative: [1] COVID-19 suspected (e.g., cough, fever, shortness of breath)  AND [2] mild symptoms AND [3] public health department recommends testing   Negative: [1] COVID-19 exposure AND [2] no symptoms   Negative: COVID-19 and Breastfeeding, questions about   Negative: SEVERE or constant chest pain (Exception: mild central chest pain, present only when coughing)   Negative: MODERATE difficulty breathing (e.g., speaks in phrases, SOB even at rest, pulse 100-120)   Negative: Patient sounds very sick or weak to the triager   Negative: MILD difficulty breathing (e.g., minimal/no SOB at rest, SOB with walking, pulse <100)   Negative: Chest pain   Negative: Fever > 103 F (39.4 C)   Negative: [1] Fever > 101 F (38.3 C) AND [2] age > 60   Negative: [1] Fever > 100.0 F (37.8 C) AND [2] bedridden (e.g., nursing home patient, CVA, chronic illness, recovering from surgery)   Negative: HIGH RISK patient (e.g., age > 64 years, diabetes, heart or lung disease, weak immune system)   Negative: Fever present > 3 days (72 hours)   Negative: [1] Fever returns after gone for over 24 hours AND [2] symptoms worse or not improved   Negative: [1] Continuous (nonstop) coughing interferes with work or school AND [2] no improvement using cough treatment per protocol   Negative: Cough present > 3 weeks    Protocols used: CORONAVIRUS (COVID-19) - DIAGNOSED OR [id number], CORONAVIRUS (COVID-19) DIAGNOSED OR [id number]

## 2020-07-15 DIAGNOSIS — R43.9 PROBLEMS WITH SMELL AND TASTE: ICD-10-CM

## 2020-07-15 DIAGNOSIS — R06.02 SHORTNESS OF BREATH: ICD-10-CM

## 2020-07-24 ENCOUNTER — HOSPITAL ENCOUNTER (EMERGENCY)
Facility: HOSPITAL | Age: 25
Discharge: HOME | End: 2020-07-24
Attending: EMERGENCY MEDICINE
Payer: COMMERCIAL

## 2020-07-24 VITALS
TEMPERATURE: 99 F | HEART RATE: 102 BPM | DIASTOLIC BLOOD PRESSURE: 82 MMHG | SYSTOLIC BLOOD PRESSURE: 141 MMHG | WEIGHT: 152 LBS | RESPIRATION RATE: 18 BRPM | OXYGEN SATURATION: 99 % | BODY MASS INDEX: 29.69 KG/M2

## 2020-07-24 DIAGNOSIS — Z71.89 EDUCATED ABOUT COVID-19 VIRUS INFECTION: Primary | ICD-10-CM

## 2020-07-24 LAB — SARS-COV-2 RDRP RESP QL NAA+PROBE: NEGATIVE

## 2020-07-24 PROCEDURE — U0002 COVID-19 LAB TEST NON-CDC: HCPCS

## 2020-07-24 PROCEDURE — 99282 EMERGENCY DEPT VISIT SF MDM: CPT

## 2020-07-24 NOTE — ED PROVIDER NOTES
"Encounter Date: 7/24/2020       History     Chief Complaint   Patient presents with    COVID-19 Concerns     med/surg nurse; SOB, sore throat, decreased sense of taste-symptoms x 1.5 weeks; covid test last week was reported as "inconclusive", covid test yesterday has not resulted; was instructed to come to ED for rapid     Patient is a 25-year-old female who presents today for COVID-19 evaluation.  Patient states that she was recently around her grandmother who tested positive for COVID-19.  She then started having symptoms 11 days ago.  Her symptoms were sore throat, dysgeusia, and shortness of breath.  She had a COVID-19 test last week, but was told that it was inconclusive.  Her symptoms resolved 4 days ago.  Per work however requested that she come to the emergency department for COVID-19 testing.  Patient denies any active symptoms        Review of patient's allergies indicates:  No Known Allergies  No past medical history on file.  Past Surgical History:   Procedure Laterality Date    EYE SURGERY      wrist cyst removal Right      Family History   Problem Relation Age of Onset    Breast cancer Maternal Grandmother 66    Colon cancer Neg Hx     Ovarian cancer Neg Hx      Social History     Tobacco Use    Smoking status: Never Smoker    Smokeless tobacco: Never Used   Substance Use Topics    Alcohol use: Yes     Frequency: Monthly or less     Drinks per session: 1 or 2     Binge frequency: Never     Comment: occ    Drug use: No     Review of Systems   Constitutional: Negative for chills, diaphoresis and fever.   HENT: Negative for congestion, rhinorrhea and sore throat.    Eyes: Negative for pain, redness and visual disturbance.   Respiratory: Negative for cough and shortness of breath.    Cardiovascular: Negative for chest pain, palpitations and leg swelling.   Gastrointestinal: Negative for abdominal distention, abdominal pain, blood in stool, constipation, diarrhea, nausea and vomiting. "   Genitourinary: Negative for dysuria and hematuria.   Musculoskeletal: Negative for arthralgias and joint swelling.   Skin: Negative for rash and wound.   Neurological: Negative for seizures, syncope and headaches.   All other systems reviewed and are negative.      Physical Exam     Initial Vitals [07/24/20 1032]   BP Pulse Resp Temp SpO2   (!) 141/82 102 18 99.4 °F (37.4 °C) 99 %      MAP       --         Physical Exam    Nursing note and vitals reviewed.  Constitutional: She appears well-developed and well-nourished. No distress.   HENT:   Head: Normocephalic and atraumatic.   Mouth/Throat: Oropharynx is clear and moist.   Eyes: Conjunctivae and EOM are normal. Pupils are equal, round, and reactive to light.   Neck: Neck supple. No tracheal deviation present.   Cardiovascular: Normal rate, regular rhythm, normal heart sounds and intact distal pulses.   Pulmonary/Chest: Breath sounds normal. No respiratory distress.   Abdominal: Soft. She exhibits no distension. There is no abdominal tenderness. There is no rebound and no guarding.   Musculoskeletal: Normal range of motion. No tenderness or edema.   Neurological: She is alert and oriented to person, place, and time.   No focal deficits   Skin: Skin is warm. No rash noted. No erythema.   Psychiatric: She has a normal mood and affect. Her behavior is normal.         ED Course   Procedures  Labs Reviewed   SARS-COV-2 RNA AMPLIFICATION, QUAL     Results for orders placed or performed during the hospital encounter of 07/24/20   COVID-19 Rapid Screening   Result Value Ref Range    SARS-CoV-2 RNA, Amplification, Qual Negative Negative                                                 Clinical Impression:   Final diagnoses:  [Z71.89] Educated About Covid-19 Virus Infection (Primary)          ED Disposition Condition    Discharge Stable        ED Prescriptions     None        Follow-up Information     Follow up With Specialties Details Why Contact Info    David Simmons MD  Family Medicine Call   [address] [facility]  [place] [address]  [phone]      [facility] -  Emergency Medicine  As needed, If symptoms worsen [address]  [facility] [address] [phone]                                     Flex Arcos MD  07/24/20 2706

## 2020-07-25 ENCOUNTER — TELEPHONE (OUTPATIENT)
Dept: PRIMARY CARE CLINIC | Facility: CLINIC | Age: 25
End: 2020-07-25

## 2020-07-25 NOTE — TELEPHONE ENCOUNTER
Patient has been notified of COVID test result on behalf of Employee Health and provided clinical guidance on such. Cleared to RTW.

## 2020-10-08 ENCOUNTER — PATIENT MESSAGE (OUTPATIENT)
Dept: OBSTETRICS AND GYNECOLOGY | Facility: CLINIC | Age: 25
End: 2020-10-08

## 2020-12-07 DIAGNOSIS — R53.83 FATIGUE, UNSPECIFIED TYPE: ICD-10-CM

## 2020-12-07 DIAGNOSIS — R52 BODY ACHES: Primary | ICD-10-CM

## 2020-12-07 DIAGNOSIS — R68.83 CHILLS: ICD-10-CM

## 2020-12-08 ENCOUNTER — TELEPHONE (OUTPATIENT)
Dept: PRIMARY CARE CLINIC | Facility: CLINIC | Age: 25
End: 2020-12-08

## 2020-12-08 ENCOUNTER — LAB VISIT (OUTPATIENT)
Dept: URGENT CARE | Facility: CLINIC | Age: 25
End: 2020-12-08
Payer: COMMERCIAL

## 2020-12-08 VITALS
HEIGHT: 62 IN | SYSTOLIC BLOOD PRESSURE: 125 MMHG | HEART RATE: 85 BPM | DIASTOLIC BLOOD PRESSURE: 72 MMHG | WEIGHT: 150 LBS | BODY MASS INDEX: 27.6 KG/M2 | OXYGEN SATURATION: 96 % | TEMPERATURE: 99 F

## 2020-12-08 DIAGNOSIS — R53.83 FATIGUE, UNSPECIFIED TYPE: ICD-10-CM

## 2020-12-08 DIAGNOSIS — R52 BODY ACHES: ICD-10-CM

## 2020-12-08 DIAGNOSIS — R68.83 CHILLS: ICD-10-CM

## 2020-12-08 LAB
CTP QC/QA: YES
SARS-COV-2 RDRP RESP QL NAA+PROBE: NEGATIVE

## 2020-12-08 PROCEDURE — U0002 COVID-19 LAB TEST NON-CDC: HCPCS | Mod: QW,S$GLB,, | Performed by: INTERNAL MEDICINE

## 2020-12-08 PROCEDURE — U0002: ICD-10-PCS | Mod: QW,S$GLB,, | Performed by: INTERNAL MEDICINE

## 2020-12-08 NOTE — TELEPHONE ENCOUNTER
Called patient regarding negative COVID results. N/A left VM with return to work 12/9 and number for employee health with any questions.

## 2021-04-28 ENCOUNTER — PATIENT MESSAGE (OUTPATIENT)
Dept: RESEARCH | Facility: HOSPITAL | Age: 26
End: 2021-04-28

## 2021-05-04 ENCOUNTER — OFFICE VISIT (OUTPATIENT)
Dept: OBSTETRICS AND GYNECOLOGY | Facility: CLINIC | Age: 26
End: 2021-05-04
Payer: COMMERCIAL

## 2021-05-04 VITALS — BODY MASS INDEX: 27.98 KG/M2 | DIASTOLIC BLOOD PRESSURE: 70 MMHG | WEIGHT: 153 LBS | SYSTOLIC BLOOD PRESSURE: 118 MMHG

## 2021-05-04 DIAGNOSIS — Z30.41 ENCOUNTER FOR SURVEILLANCE OF CONTRACEPTIVE PILLS: Primary | ICD-10-CM

## 2021-05-04 DIAGNOSIS — Z12.4 PAP SMEAR FOR CERVICAL CANCER SCREENING: ICD-10-CM

## 2021-05-04 PROCEDURE — 99999 PR PBB SHADOW E&M-EST. PATIENT-LVL II: CPT | Mod: PBBFAC,,, | Performed by: OBSTETRICS & GYNECOLOGY

## 2021-05-04 PROCEDURE — 3008F BODY MASS INDEX DOCD: CPT | Mod: CPTII,S$GLB,, | Performed by: OBSTETRICS & GYNECOLOGY

## 2021-05-04 PROCEDURE — 88142 CYTOPATH C/V THIN LAYER: CPT | Performed by: OBSTETRICS & GYNECOLOGY

## 2021-05-04 PROCEDURE — 99999 PR PBB SHADOW E&M-EST. PATIENT-LVL II: ICD-10-PCS | Mod: PBBFAC,,, | Performed by: OBSTETRICS & GYNECOLOGY

## 2021-05-04 PROCEDURE — 3008F PR BODY MASS INDEX (BMI) DOCUMENTED: ICD-10-PCS | Mod: CPTII,S$GLB,, | Performed by: OBSTETRICS & GYNECOLOGY

## 2021-05-04 PROCEDURE — 99395 PREV VISIT EST AGE 18-39: CPT | Mod: S$GLB,,, | Performed by: OBSTETRICS & GYNECOLOGY

## 2021-05-04 PROCEDURE — 99395 PR PREVENTIVE VISIT,EST,18-39: ICD-10-PCS | Mod: S$GLB,,, | Performed by: OBSTETRICS & GYNECOLOGY

## 2021-05-04 RX ORDER — NORGESTIMATE AND ETHINYL ESTRADIOL 0.25-0.035
1 KIT ORAL DAILY
Qty: 28 TABLET | Refills: 11 | Status: SHIPPED | OUTPATIENT
Start: 2021-05-04 | End: 2022-03-06 | Stop reason: SDUPTHER

## 2021-05-12 LAB
FINAL PATHOLOGIC DIAGNOSIS: NORMAL
Lab: NORMAL

## 2021-09-13 ENCOUNTER — DOCUMENTATION ONLY (OUTPATIENT)
Dept: REHABILITATION | Facility: HOSPITAL | Age: 26
End: 2021-09-13

## 2021-09-13 PROBLEM — M54.2 CERVICALGIA: Status: RESOLVED | Noted: 2020-05-07 | Resolved: 2021-09-13

## 2022-03-06 DIAGNOSIS — Z30.41 ENCOUNTER FOR SURVEILLANCE OF CONTRACEPTIVE PILLS: ICD-10-CM

## 2022-03-08 RX ORDER — NORGESTIMATE AND ETHINYL ESTRADIOL 0.25-0.035
1 KIT ORAL DAILY
Qty: 28 TABLET | Refills: 3 | OUTPATIENT
Start: 2022-03-08 | End: 2022-03-17 | Stop reason: SDUPTHER

## 2022-03-17 DIAGNOSIS — Z30.41 ENCOUNTER FOR SURVEILLANCE OF CONTRACEPTIVE PILLS: ICD-10-CM

## 2022-03-17 NOTE — TELEPHONE ENCOUNTER
----- Message from Lazaro Thomas sent at 3/17/2022 12:11 PM CDT -----  Pt would like return call ; pt states pharmacy has not received prescription fornorgestimate-ethinyl estradioL (ORTHO-CYCLEN) 0.25-35 mg-mcg per tablet.   Please call back at .[phone].  Darnellx Md          Pt uses.  [facility]  [place], [place]

## 2022-03-18 RX ORDER — NORGESTIMATE AND ETHINYL ESTRADIOL 0.25-0.035
1 KIT ORAL DAILY
Qty: 28 TABLET | Refills: 2 | OUTPATIENT
Start: 2022-03-18 | End: 2022-04-11 | Stop reason: SDUPTHER

## 2022-04-04 ENCOUNTER — PATIENT MESSAGE (OUTPATIENT)
Dept: OBSTETRICS AND GYNECOLOGY | Facility: CLINIC | Age: 27
End: 2022-04-04
Payer: COMMERCIAL

## 2022-04-11 DIAGNOSIS — Z30.41 ENCOUNTER FOR SURVEILLANCE OF CONTRACEPTIVE PILLS: ICD-10-CM

## 2022-04-11 NOTE — TELEPHONE ENCOUNTER
norgestimate-ethinyl estradioL (ORTHO-CYCLEN) 0.25-35 mg-mcg per tablet 28 tablet 2 3/18/2022 3/18/2023    Sig - Route: Take 1 tablet by mouth once daily. - Oral    Class: Print        Associated Diagnoses    Encounter for surveillance of contraceptive pills         Pharmacy    [facility] - [place] - [address]     Called patient and she has been trying to get her b/c filled.  Upon checking, her prescription was and PR[facility] prescription and never sent.  Call pharmacy and held for over 10 minutes and had to hang up.  Will send to J to re-sent.

## 2022-04-11 NOTE — TELEPHONE ENCOUNTER
----- Message from Alize Mclaughlin sent at 4/11/2022  1:54 PM CDT -----  Regarding: refill/wrong pharamcy  Contact: pt  Please call pt regarding her prescription going to the wrong pharmacy. Pt states she has not heard from office in 2 weeks. [phone]

## 2022-04-12 RX ORDER — NORGESTIMATE AND ETHINYL ESTRADIOL 0.25-0.035
1 KIT ORAL DAILY
Qty: 28 TABLET | Refills: 2 | Status: SHIPPED | OUTPATIENT
Start: 2022-04-12 | End: 2022-04-22 | Stop reason: SDUPTHER

## 2022-04-12 NOTE — TELEPHONE ENCOUNTER
----- Message from Margaux Amaya sent at 4/12/2022 10:54 AM CDT -----  Contact: [phone] [facility]  Requesting an RX refill or new RX.  Is this a refill or new RX: refill  RX name and strength (copy/paste from chart):  norgestimate-ethinyl estradioL (ORTHO-CYCLEN) 0.25-35 mg-mcg per tablet  Is this a 30 day or 90 day RX:   Pharmacy name and phone # (copy/paste from chart):      [facility] - [place] - [address]  [address]  [place] [address]  Phone: [phone] Fax: [phone]

## 2022-04-12 NOTE — TELEPHONE ENCOUNTER
norgestimate-ethinyl estradioL (ORTHO-CYCLEN) 0.25-35 mg-mcg per tablet 28 tablet 2 4/12/2022 4/12/2023 No   Sig - Route: Take 1 tablet by mouth once daily. - Oral   Sent to pharmacy as: norgestimate-ethinyl estradioL (ORTHO-CYCLEN) 0.25-35 mg-mcg per tablet   Class: Normal   Order: [id number]   Date/Time Signed: 4/12/2022 13:38       E-Prescribing Status: Receipt confirmed by pharmacy (4/12/2022  1:38 PM CDT)       Pharmacy    [id number] - [place] [address]        Called patient and advised prescription sent to pharmacy and patient verbalized understanding.

## 2022-04-22 ENCOUNTER — LAB VISIT (OUTPATIENT)
Dept: LAB | Facility: HOSPITAL | Age: 27
End: 2022-04-22
Attending: NURSE PRACTITIONER
Payer: COMMERCIAL

## 2022-04-22 ENCOUNTER — OFFICE VISIT (OUTPATIENT)
Dept: OBSTETRICS AND GYNECOLOGY | Facility: CLINIC | Age: 27
End: 2022-04-22
Payer: COMMERCIAL

## 2022-04-22 VITALS
HEIGHT: 62 IN | WEIGHT: 161.19 LBS | SYSTOLIC BLOOD PRESSURE: 128 MMHG | BODY MASS INDEX: 29.66 KG/M2 | DIASTOLIC BLOOD PRESSURE: 82 MMHG

## 2022-04-22 DIAGNOSIS — R63.5 UNEXPLAINED WEIGHT GAIN: ICD-10-CM

## 2022-04-22 DIAGNOSIS — R63.5 UNEXPLAINED WEIGHT GAIN: Primary | ICD-10-CM

## 2022-04-22 DIAGNOSIS — Z30.41 ENCOUNTER FOR SURVEILLANCE OF CONTRACEPTIVE PILLS: ICD-10-CM

## 2022-04-22 LAB
ESTIMATED AVG GLUCOSE: 97 MG/DL (ref 68–131)
HBA1C MFR BLD: 5 % (ref 4–5.6)
HCG INTACT+B SERPL-ACNC: <1.2 MIU/ML
INSULIN COLLECTION INTERVAL: NORMAL
INSULIN SERPL-ACNC: 13.7 UU/ML
TSH SERPL DL<=0.005 MIU/L-ACNC: 1.96 UIU/ML (ref 0.4–4)

## 2022-04-22 PROCEDURE — 1160F RVW MEDS BY RX/DR IN RCRD: CPT | Mod: CPTII,S$GLB,, | Performed by: NURSE PRACTITIONER

## 2022-04-22 PROCEDURE — 99999 PR PBB SHADOW E&M-EST. PATIENT-LVL III: ICD-10-PCS | Mod: PBBFAC,,, | Performed by: NURSE PRACTITIONER

## 2022-04-22 PROCEDURE — 3008F PR BODY MASS INDEX (BMI) DOCUMENTED: ICD-10-PCS | Mod: CPTII,S$GLB,, | Performed by: NURSE PRACTITIONER

## 2022-04-22 PROCEDURE — 3074F SYST BP LT 130 MM HG: CPT | Mod: CPTII,S$GLB,, | Performed by: NURSE PRACTITIONER

## 2022-04-22 PROCEDURE — 83036 HEMOGLOBIN GLYCOSYLATED A1C: CPT | Performed by: NURSE PRACTITIONER

## 2022-04-22 PROCEDURE — 83525 ASSAY OF INSULIN: CPT | Performed by: NURSE PRACTITIONER

## 2022-04-22 PROCEDURE — 99999 PR PBB SHADOW E&M-EST. PATIENT-LVL III: CPT | Mod: PBBFAC,,, | Performed by: NURSE PRACTITIONER

## 2022-04-22 PROCEDURE — 3074F PR MOST RECENT SYSTOLIC BLOOD PRESSURE < 130 MM HG: ICD-10-PCS | Mod: CPTII,S$GLB,, | Performed by: NURSE PRACTITIONER

## 2022-04-22 PROCEDURE — 1159F PR MEDICATION LIST DOCUMENTED IN MEDICAL RECORD: ICD-10-PCS | Mod: CPTII,S$GLB,, | Performed by: NURSE PRACTITIONER

## 2022-04-22 PROCEDURE — 3079F PR MOST RECENT DIASTOLIC BLOOD PRESSURE 80-89 MM HG: ICD-10-PCS | Mod: CPTII,S$GLB,, | Performed by: NURSE PRACTITIONER

## 2022-04-22 PROCEDURE — 99213 OFFICE O/P EST LOW 20 MIN: CPT | Mod: S$GLB,,, | Performed by: NURSE PRACTITIONER

## 2022-04-22 PROCEDURE — 3008F BODY MASS INDEX DOCD: CPT | Mod: CPTII,S$GLB,, | Performed by: NURSE PRACTITIONER

## 2022-04-22 PROCEDURE — 1160F PR REVIEW ALL MEDS BY PRESCRIBER/CLIN PHARMACIST DOCUMENTED: ICD-10-PCS | Mod: CPTII,S$GLB,, | Performed by: NURSE PRACTITIONER

## 2022-04-22 PROCEDURE — 3079F DIAST BP 80-89 MM HG: CPT | Mod: CPTII,S$GLB,, | Performed by: NURSE PRACTITIONER

## 2022-04-22 PROCEDURE — 99213 PR OFFICE/OUTPT VISIT, EST, LEVL III, 20-29 MIN: ICD-10-PCS | Mod: S$GLB,,, | Performed by: NURSE PRACTITIONER

## 2022-04-22 PROCEDURE — 84443 ASSAY THYROID STIM HORMONE: CPT | Performed by: NURSE PRACTITIONER

## 2022-04-22 PROCEDURE — 1159F MED LIST DOCD IN RCRD: CPT | Mod: CPTII,S$GLB,, | Performed by: NURSE PRACTITIONER

## 2022-04-22 PROCEDURE — 84702 CHORIONIC GONADOTROPIN TEST: CPT | Performed by: NURSE PRACTITIONER

## 2022-04-22 RX ORDER — NORGESTIMATE AND ETHINYL ESTRADIOL 0.25-0.035
1 KIT ORAL DAILY
Qty: 30 TABLET | Refills: 11 | Status: SHIPPED | OUTPATIENT
Start: 2022-04-22 | End: 2022-06-24 | Stop reason: SDUPTHER

## 2022-04-22 NOTE — PROGRESS NOTES
Subjective:       Patient ID: Saima Ruiz is a 27 y.o. female.    Chief Complaint:  Contraception    Patient's last menstrual period was 2022.     History of Present Illness  Patient   Presents to clinic with complaints of 10 lb weight gain in 2 weeks.  Patient denies change in diet or exercise regimen.  Patient reports cardio 3-4 times per week.   Denies change in diet although does report occasional poor dietary habits.  Patient currently on OCPs, taking daily as prescribed. Has been on current OCP for last year. Denies change in stress level.     OB History    Para Term  AB Living   0 0 0 0 0 0   SAB IAB Ectopic Multiple Live Births   0 0 0 0         Review of Systems  Review of Systems   Constitutional: Positive for unexpected weight change (gain). Negative for appetite change, fatigue and fever.   Gastrointestinal: Negative for abdominal pain, bloating, constipation, diarrhea, nausea and vomiting.   Genitourinary: Negative for bladder incontinence, dysmenorrhea, dyspareunia, dysuria, flank pain, frequency, genital sores, menorrhagia, menstrual problem, pelvic pain, urgency, vaginal bleeding, vaginal discharge, vaginal pain, postcoital bleeding, vaginal dryness and vaginal odor.   All other systems reviewed and are negative.           Objective:      Physical Exam:   Constitutional: She is oriented to person, place, and time. She appears well-developed and well-nourished.    HENT:   Head: Normocephalic and atraumatic.    Eyes: Pupils are equal, round, and reactive to light. Conjunctivae and EOM are normal.     Cardiovascular: Normal rate, regular rhythm and normal heart sounds.     Pulmonary/Chest: Effort normal.        Abdominal: Soft. There is no abdominal tenderness.     Genitourinary:    Genitourinary Comments: deferred             Musculoskeletal: Normal range of motion and moves all extremeties.       Neurological: She is alert and oriented to person, place, and time.    Skin:  Skin is warm and dry. She is not diaphoretic.    Psychiatric: She has a normal mood and affect. Her behavior is normal. Judgment and thought content normal.            Assessment:     1. Unexplained weight gain    2. Encounter for surveillance of contraceptive pills              Plan:   Saima was seen today for contraception.    Diagnoses and all orders for this visit:    Unexplained weight gain  -     TSH; Future  -     Insulin, Random; Future  -     HCG, Quantitative; Future  -     Hemoglobin A1C; Future    Encounter for surveillance of contraceptive pills  -     norgestimate-ethinyl estradioL (ORTHO-CYCLEN) 0.25-35 mg-mcg per tablet; Take 1 tablet by mouth once daily.      Labs today, will follow.  Recommend strict diet (low carb, low sugar, high protein) and continuing with exercise regimen. Increased water intake.

## 2022-06-03 ENCOUNTER — TELEPHONE (OUTPATIENT)
Dept: OBSTETRICS AND GYNECOLOGY | Facility: CLINIC | Age: 27
End: 2022-06-03
Payer: COMMERCIAL

## 2022-06-03 NOTE — TELEPHONE ENCOUNTER
Called pt to notify that Dr. Lynch will not be in clinic on the day of her appt and to help reschedule. No answer. LVM.

## 2022-06-24 ENCOUNTER — OFFICE VISIT (OUTPATIENT)
Dept: OBSTETRICS AND GYNECOLOGY | Facility: CLINIC | Age: 27
End: 2022-06-24
Payer: COMMERCIAL

## 2022-06-24 VITALS
SYSTOLIC BLOOD PRESSURE: 122 MMHG | DIASTOLIC BLOOD PRESSURE: 86 MMHG | BODY MASS INDEX: 29.19 KG/M2 | WEIGHT: 159.63 LBS

## 2022-06-24 DIAGNOSIS — Z11.3 SCREEN FOR STD (SEXUALLY TRANSMITTED DISEASE): ICD-10-CM

## 2022-06-24 DIAGNOSIS — Z30.41 ENCOUNTER FOR SURVEILLANCE OF CONTRACEPTIVE PILLS: Primary | ICD-10-CM

## 2022-06-24 PROCEDURE — 1160F PR REVIEW ALL MEDS BY PRESCRIBER/CLIN PHARMACIST DOCUMENTED: ICD-10-PCS | Mod: CPTII,S$GLB,, | Performed by: OBSTETRICS & GYNECOLOGY

## 2022-06-24 PROCEDURE — 99999 PR PBB SHADOW E&M-EST. PATIENT-LVL III: CPT | Mod: PBBFAC,,, | Performed by: OBSTETRICS & GYNECOLOGY

## 2022-06-24 PROCEDURE — 99395 PR PREVENTIVE VISIT,EST,18-39: ICD-10-PCS | Mod: S$GLB,,, | Performed by: OBSTETRICS & GYNECOLOGY

## 2022-06-24 PROCEDURE — 1160F RVW MEDS BY RX/DR IN RCRD: CPT | Mod: CPTII,S$GLB,, | Performed by: OBSTETRICS & GYNECOLOGY

## 2022-06-24 PROCEDURE — 3008F PR BODY MASS INDEX (BMI) DOCUMENTED: ICD-10-PCS | Mod: CPTII,S$GLB,, | Performed by: OBSTETRICS & GYNECOLOGY

## 2022-06-24 PROCEDURE — 99395 PREV VISIT EST AGE 18-39: CPT | Mod: S$GLB,,, | Performed by: OBSTETRICS & GYNECOLOGY

## 2022-06-24 PROCEDURE — 99999 PR PBB SHADOW E&M-EST. PATIENT-LVL III: ICD-10-PCS | Mod: PBBFAC,,, | Performed by: OBSTETRICS & GYNECOLOGY

## 2022-06-24 PROCEDURE — 1159F MED LIST DOCD IN RCRD: CPT | Mod: CPTII,S$GLB,, | Performed by: OBSTETRICS & GYNECOLOGY

## 2022-06-24 PROCEDURE — 3074F PR MOST RECENT SYSTOLIC BLOOD PRESSURE < 130 MM HG: ICD-10-PCS | Mod: CPTII,S$GLB,, | Performed by: OBSTETRICS & GYNECOLOGY

## 2022-06-24 PROCEDURE — 1159F PR MEDICATION LIST DOCUMENTED IN MEDICAL RECORD: ICD-10-PCS | Mod: CPTII,S$GLB,, | Performed by: OBSTETRICS & GYNECOLOGY

## 2022-06-24 PROCEDURE — 3008F BODY MASS INDEX DOCD: CPT | Mod: CPTII,S$GLB,, | Performed by: OBSTETRICS & GYNECOLOGY

## 2022-06-24 PROCEDURE — 3074F SYST BP LT 130 MM HG: CPT | Mod: CPTII,S$GLB,, | Performed by: OBSTETRICS & GYNECOLOGY

## 2022-06-24 PROCEDURE — 3044F PR MOST RECENT HEMOGLOBIN A1C LEVEL <7.0%: ICD-10-PCS | Mod: CPTII,S$GLB,, | Performed by: OBSTETRICS & GYNECOLOGY

## 2022-06-24 PROCEDURE — 87491 CHLMYD TRACH DNA AMP PROBE: CPT | Performed by: OBSTETRICS & GYNECOLOGY

## 2022-06-24 PROCEDURE — 3044F HG A1C LEVEL LT 7.0%: CPT | Mod: CPTII,S$GLB,, | Performed by: OBSTETRICS & GYNECOLOGY

## 2022-06-24 PROCEDURE — 3079F PR MOST RECENT DIASTOLIC BLOOD PRESSURE 80-89 MM HG: ICD-10-PCS | Mod: CPTII,S$GLB,, | Performed by: OBSTETRICS & GYNECOLOGY

## 2022-06-24 PROCEDURE — 3079F DIAST BP 80-89 MM HG: CPT | Mod: CPTII,S$GLB,, | Performed by: OBSTETRICS & GYNECOLOGY

## 2022-06-24 PROCEDURE — 87591 N.GONORRHOEAE DNA AMP PROB: CPT | Performed by: OBSTETRICS & GYNECOLOGY

## 2022-06-24 RX ORDER — NORGESTIMATE AND ETHINYL ESTRADIOL 0.25-0.035
1 KIT ORAL DAILY
Qty: 28 TABLET | Refills: 11 | Status: SHIPPED | OUTPATIENT
Start: 2022-06-24 | End: 2023-06-05 | Stop reason: SDUPTHER

## 2022-06-24 NOTE — PROGRESS NOTES
Subjective:       Patient ID: Saima Ruiz is a 27 y.o. female.    Chief Complaint:  Annual Exam      History of Present Illness  HPI  Annual Exam-Premenopausal  Patient presents for annual exam. The patient has no complaints today. The patient is sexually active. GYN screening history: last pap: approximate date  and was normal. The patient wears seatbelts: yes. The patient participates in regular exercise: yes. Has the patient ever been transfused or tattooed?: yes. The patient reports that there is not domestic violence in her life.  Menses are regular with periods lasting 5 days.  Denies excessive bleeding or cramping.  Still has occasional breakthrough spotting on OCP.   Desires to continue with OCP.      GYN & OB History  Patient's last menstrual period was 2022.   Date of Last Pap: 2021    OB History    Para Term  AB Living   0 0 0 0 0 0   SAB IAB Ectopic Multiple Live Births   0 0 0 0         Review of Systems  Review of Systems   Constitutional: Negative for activity change, appetite change, chills, fatigue, fever and unexpected weight change.   Respiratory: Negative for shortness of breath.    Cardiovascular: Negative for chest pain, palpitations and leg swelling.   Gastrointestinal: Negative for abdominal pain, bloating, blood in stool, constipation, diarrhea, nausea and vomiting.   Genitourinary: Negative for dysmenorrhea, dyspareunia, dysuria, flank pain, frequency, genital sores, hematuria, menorrhagia, menstrual problem, pelvic pain, urgency, vaginal bleeding, vaginal discharge, vaginal pain, urinary incontinence, postcoital bleeding, vaginal dryness and vaginal odor.   Musculoskeletal: Negative for back pain.   Integumentary:  Negative for breast mass, nipple discharge, breast skin changes and breast tenderness.   Neurological: Negative for syncope and headaches.   Breast: Negative for asymmetry, lump, mass, mastodynia, nipple discharge, skin changes and  tenderness          Objective:    Physical Exam:   Constitutional: She is oriented to person, place, and time. She appears well-developed and well-nourished. No distress.    HENT:   Head: Normocephalic and atraumatic.    Eyes: Pupils are equal, round, and reactive to light. EOM are normal.     Cardiovascular: Normal rate, regular rhythm and normal heart sounds.     Pulmonary/Chest: Effort normal and breath sounds normal.        Abdominal: Soft. Bowel sounds are normal. She exhibits no distension. There is no abdominal tenderness.     Genitourinary:    Vagina and uterus normal.      Pelvic exam was performed with patient supine.   There is no rash, tenderness, lesion or injury on the right labia. There is no rash, tenderness, lesion or injury on the left labia. Cervix is normal. Right adnexum displays no mass, no tenderness and no fullness. Left adnexum displays no mass, no tenderness and no fullness. No erythema,  no vaginal discharge, tenderness or bleeding in the vagina.    No foreign body in the vagina.      No signs of injury in the vagina.   Cervix exhibits no motion tenderness, no discharge and no friability. Uterus is not deviated, not enlarged and not tender.           Musculoskeletal: Normal range of motion and moves all extremeties. No tenderness or edema.       Neurological: She is alert and oriented to person, place, and time.    Skin: Skin is warm and dry.    Psychiatric: She has a normal mood and affect. Her behavior is normal. Thought content normal.          Assessment:        1. Encounter for surveillance of contraceptive pills    2. Screen for STD (sexually transmitted disease)             Plan:      Encounter for surveillance of contraceptive pills  -     norgestimate-ethinyl estradioL (ORTHO-CYCLEN) 0.25-35 mg-mcg per tablet; Take 1 tablet by mouth once daily.  Dispense: 28 tablet; Refill: 11  -     Pt was counseled on contraception options, including associated risks and benefits of each.  Pt  voiced understanding and desires to proceed with OCP.  Medication dosing, side-effects, risks, benefits, and alternatives were discussed.  Medical history was reviewed and pt is a candidate for OCP use.  -     Pt was counseled on cervical/vaginal screening guidelines and recommendations.  Last pap NILM on 2021.  As per current ASCCP guidelines, next pap is due 2024.  -     Pt was advised on current breast cancer screening recommendations.    -     Follow up with PCP for routine health maintenance needs.    Screen for STD (sexually transmitted disease)  -     C. trachomatis/N. gonorrhoeae by AMP DNA      Follow up in about 1 year (around 6/24/2023).

## 2022-06-27 LAB
C TRACH DNA SPEC QL NAA+PROBE: NOT DETECTED
N GONORRHOEA DNA SPEC QL NAA+PROBE: NOT DETECTED

## 2022-07-07 ENCOUNTER — PATIENT MESSAGE (OUTPATIENT)
Dept: OBSTETRICS AND GYNECOLOGY | Facility: CLINIC | Age: 27
End: 2022-07-07
Payer: COMMERCIAL

## 2022-07-26 ENCOUNTER — PATIENT MESSAGE (OUTPATIENT)
Dept: OBSTETRICS AND GYNECOLOGY | Facility: CLINIC | Age: 27
End: 2022-07-26
Payer: COMMERCIAL

## 2022-11-02 ENCOUNTER — TELEPHONE (OUTPATIENT)
Dept: OBSTETRICS AND GYNECOLOGY | Facility: CLINIC | Age: 27
End: 2022-11-02
Payer: COMMERCIAL

## 2022-11-02 NOTE — TELEPHONE ENCOUNTER
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 If she has any availably to do so please call her at [phone].    Thank you    Shad

## 2023-05-11 ENCOUNTER — PATIENT MESSAGE (OUTPATIENT)
Dept: OBSTETRICS AND GYNECOLOGY | Facility: CLINIC | Age: 28
End: 2023-05-11
Payer: COMMERCIAL

## 2023-06-05 DIAGNOSIS — Z30.41 ENCOUNTER FOR SURVEILLANCE OF CONTRACEPTIVE PILLS: ICD-10-CM

## 2023-06-05 RX ORDER — NORGESTIMATE AND ETHINYL ESTRADIOL 0.25-0.035
1 KIT ORAL DAILY
Qty: 28 TABLET | Refills: 0 | Status: SHIPPED | OUTPATIENT
Start: 2023-06-05 | End: 2023-06-22 | Stop reason: SDUPTHER

## 2023-06-05 NOTE — TELEPHONE ENCOUNTER
----- Message from Brooke Rogers sent at 6/5/2023  7:35 AM CDT -----  Contact: [phone]  Patient would like to consult with a nurse in regards to a prescription request for her birth control. Patient is requesting a refill today. Please call to advise at [phone]. Thanks

## 2023-06-22 DIAGNOSIS — Z30.41 ENCOUNTER FOR SURVEILLANCE OF CONTRACEPTIVE PILLS: ICD-10-CM

## 2023-06-22 NOTE — TELEPHONE ENCOUNTER
----- Message from Marine Youssef sent at 6/22/2023 10:01 AM CDT -----  Name of Who is Calling: Patient       What is the request in detail:  Patient is requesting a refill on birth control. Patient states she is on time with medication and has not missed any days but that her appointment is not until 07/14. Patient will be out of medication before appointment date.     norgestimate-ethinyl estradioL (ORTHO-CYCLEN) 0.25-35 mg-mcg per tablet 28 tablet 0 6/5/2023 6/4/2024 No  Sig - Route: Take 1 tablet by mouth once daily. - Oral  Sent to pharmacy as: norgestimate-ethinyl estradioL (ORTHO-CYCLEN) 0.25-35 mg-mcg per tablet  Class: Normal  Order: [id number]  Date/Time Signed: 6/5/2023 13:43      E-Prescribing Status: Receipt confirmed by pharmacy (6/5/2023  1:43 PM CDT)      [id number] - [place] - [address]  [address]  [place] [address]  Phone: [phone] Fax: [phone]            Can the clinic reply by [facility]:yes           What Number to Call Back if not in [facility]: [phone]

## 2023-06-23 RX ORDER — NORGESTIMATE AND ETHINYL ESTRADIOL 0.25-0.035
1 KIT ORAL DAILY
Qty: 28 TABLET | Refills: 0 | Status: SHIPPED | OUTPATIENT
Start: 2023-06-23 | End: 2023-07-05 | Stop reason: SDUPTHER

## 2023-07-05 ENCOUNTER — OFFICE VISIT (OUTPATIENT)
Dept: OBSTETRICS AND GYNECOLOGY | Facility: CLINIC | Age: 28
End: 2023-07-05
Payer: COMMERCIAL

## 2023-07-05 VITALS
SYSTOLIC BLOOD PRESSURE: 102 MMHG | WEIGHT: 153.88 LBS | BODY MASS INDEX: 28.32 KG/M2 | HEIGHT: 62 IN | DIASTOLIC BLOOD PRESSURE: 70 MMHG

## 2023-07-05 DIAGNOSIS — Z30.41 ENCOUNTER FOR SURVEILLANCE OF CONTRACEPTIVE PILLS: Primary | ICD-10-CM

## 2023-07-05 PROCEDURE — 99395 PREV VISIT EST AGE 18-39: CPT | Mod: S$GLB,,, | Performed by: OBSTETRICS & GYNECOLOGY

## 2023-07-05 PROCEDURE — 99999 PR PBB SHADOW E&M-EST. PATIENT-LVL III: CPT | Mod: PBBFAC,,, | Performed by: OBSTETRICS & GYNECOLOGY

## 2023-07-05 PROCEDURE — 99395 PR PREVENTIVE VISIT,EST,18-39: ICD-10-PCS | Mod: S$GLB,,, | Performed by: OBSTETRICS & GYNECOLOGY

## 2023-07-05 PROCEDURE — 99213 OFFICE O/P EST LOW 20 MIN: CPT | Mod: PBBFAC | Performed by: OBSTETRICS & GYNECOLOGY

## 2023-07-05 PROCEDURE — 99999 PR PBB SHADOW E&M-EST. PATIENT-LVL III: ICD-10-PCS | Mod: PBBFAC,,, | Performed by: OBSTETRICS & GYNECOLOGY

## 2023-07-05 RX ORDER — NORGESTIMATE AND ETHINYL ESTRADIOL 0.25-0.035
1 KIT ORAL DAILY
Qty: 84 TABLET | Refills: 3 | Status: SHIPPED | OUTPATIENT
Start: 2023-07-05 | End: 2024-01-29

## 2023-07-05 NOTE — PROGRESS NOTES
Subjective:      Patient ID: Saima Ruiz is a 28 y.o. female.    Chief Complaint:  Annual Exam      History of Present Illness  HPI  Annual Exam-Premenopausal  Patient presents for annual exam. The patient has no complaints today. The patient is sexually active. GYN screening history: last pap: approximate date  and was normal. The patient wears seatbelts: yes. The patient participates in regular exercise: no. Has the patient ever been transfused or tattooed?: yes. The patient reports that there is not domestic violence in her life.  Menses are regular with periods lasting 4-5 days.  Denies excessive bleeding or cramping.  Doing well with OCP.  Requests refill.      GYN & OB History  Patient's last menstrual period was 2023 (exact date).   Date of Last Pap: 2021    OB History    Para Term  AB Living   0 0 0 0 0 0   SAB IAB Ectopic Multiple Live Births   0 0 0 0         Review of Systems  Review of Systems   Constitutional:  Negative for activity change, appetite change, chills, fatigue, fever and unexpected weight change.   Respiratory:  Negative for shortness of breath.    Cardiovascular:  Negative for chest pain, palpitations and leg swelling.   Gastrointestinal:  Negative for abdominal pain, bloating, blood in stool, constipation, diarrhea, nausea and vomiting.   Genitourinary:  Negative for dysmenorrhea, dyspareunia, dysuria, flank pain, frequency, genital sores, hematuria, menorrhagia, menstrual problem, pelvic pain, urgency, vaginal bleeding, vaginal discharge, vaginal pain, urinary incontinence, postcoital bleeding, vaginal dryness and vaginal odor.   Musculoskeletal:  Negative for back pain.   Integumentary:  Negative for breast mass, nipple discharge, breast skin changes and breast tenderness.   Neurological:  Negative for syncope and headaches.   Breast: Negative for asymmetry, lump, mass, mastodynia, nipple discharge, skin changes and tenderness       Objective:      Physical Exam:   Constitutional: She is oriented to person, place, and time. She appears well-developed and well-nourished. No distress.    HENT:   Head: Normocephalic and atraumatic.    Eyes: Pupils are equal, round, and reactive to light. EOM are normal.     Cardiovascular:  Normal rate, regular rhythm and normal heart sounds.             Pulmonary/Chest: Effort normal and breath sounds normal.        Abdominal: Soft. Bowel sounds are normal. She exhibits no distension. There is no abdominal tenderness.     Genitourinary:    Vagina, uterus, right adnexa and left adnexa normal.      Pelvic exam was performed with patient supine.   There is no rash, tenderness, lesion or injury on the right labia. There is no rash, tenderness, lesion or injury on the left labia. Cervix is normal. Right adnexum displays no mass, no tenderness and no fullness. Left adnexum displays no mass, no tenderness and no fullness. No erythema,  no vaginal discharge, tenderness or bleeding in the vagina.    No foreign body in the vagina.      No signs of injury in the vagina.   Cervix exhibits no motion tenderness, no discharge and no friability. Uterus is not deviated, not enlarged and not tender.           Musculoskeletal: Normal range of motion and moves all extremeties. No tenderness or edema.       Neurological: She is alert and oriented to person, place, and time.    Skin: Skin is warm and dry.    Psychiatric: She has a normal mood and affect. Her behavior is normal. Thought content normal.       Assessment:     1. Encounter for surveillance of contraceptive pills             Plan:     Encounter for surveillance of contraceptive pills  -     norgestimate-ethinyl estradioL (ORTHO-CYCLEN) 0.25-35 mg-mcg per tablet; Take 1 tablet by mouth once daily.  Dispense: 84 tablet; Refill: 3  -     Pt was counseled on contraception options, including associated risks and benefits of each.  Pt voiced understanding and desires to continue with OCP.   Medication dosing, side-effects, risks, benefits, and alternatives were discussed.  Medical history was reviewed and pt is a candidate for OCP use.  -     Pt was counseled on cervical/vaginal screening guidelines and recommendations.  Last pap NILM on 2021.  As per current ASCCP guidelines, next pap is due 2024.  -     Pt was advised on current breast cancer screening recommendations.    -     Follow up with PCP for routine health maintenance needs.      Follow up in about 1 year (around 7/5/2024).

## 2024-01-29 ENCOUNTER — OFFICE VISIT (OUTPATIENT)
Dept: OBSTETRICS AND GYNECOLOGY | Facility: CLINIC | Age: 29
End: 2024-01-29
Payer: COMMERCIAL

## 2024-01-29 VITALS
WEIGHT: 159.81 LBS | HEIGHT: 62 IN | SYSTOLIC BLOOD PRESSURE: 106 MMHG | BODY MASS INDEX: 29.41 KG/M2 | DIASTOLIC BLOOD PRESSURE: 72 MMHG

## 2024-01-29 DIAGNOSIS — N92.6 IRREGULAR MENSES: Primary | ICD-10-CM

## 2024-01-29 LAB
B-HCG UR QL: NEGATIVE
CTP QC/QA: YES

## 2024-01-29 PROCEDURE — 3008F BODY MASS INDEX DOCD: CPT | Mod: CPTII,S$GLB,, | Performed by: OBSTETRICS & GYNECOLOGY

## 2024-01-29 PROCEDURE — 1160F RVW MEDS BY RX/DR IN RCRD: CPT | Mod: CPTII,S$GLB,, | Performed by: OBSTETRICS & GYNECOLOGY

## 2024-01-29 PROCEDURE — 3074F SYST BP LT 130 MM HG: CPT | Mod: CPTII,S$GLB,, | Performed by: OBSTETRICS & GYNECOLOGY

## 2024-01-29 PROCEDURE — 99999 PR PBB SHADOW E&M-EST. PATIENT-LVL III: CPT | Mod: PBBFAC,,, | Performed by: OBSTETRICS & GYNECOLOGY

## 2024-01-29 PROCEDURE — 1159F MED LIST DOCD IN RCRD: CPT | Mod: CPTII,S$GLB,, | Performed by: OBSTETRICS & GYNECOLOGY

## 2024-01-29 PROCEDURE — 99213 OFFICE O/P EST LOW 20 MIN: CPT | Mod: S$GLB,,, | Performed by: OBSTETRICS & GYNECOLOGY

## 2024-01-29 PROCEDURE — 3078F DIAST BP <80 MM HG: CPT | Mod: CPTII,S$GLB,, | Performed by: OBSTETRICS & GYNECOLOGY

## 2024-01-29 PROCEDURE — 81025 URINE PREGNANCY TEST: CPT | Mod: S$GLB,,, | Performed by: OBSTETRICS & GYNECOLOGY

## 2024-01-29 RX ORDER — ALPRAZOLAM 0.25 MG/1
0.25 TABLET ORAL 2 TIMES DAILY PRN
COMMUNITY
Start: 2023-11-27

## 2024-01-29 RX ORDER — BUTALBITAL, ACETAMINOPHEN AND CAFFEINE 50; 325; 40 MG/1; MG/1; MG/1
1 TABLET ORAL EVERY 4 HOURS PRN
COMMUNITY
Start: 2023-09-22

## 2024-01-29 RX ORDER — ZOLPIDEM TARTRATE 5 MG/1
5 TABLET ORAL NIGHTLY PRN
COMMUNITY

## 2024-01-29 NOTE — PROGRESS NOTES
Subjective:      Patient ID: Saima Ruiz is a 28 y.o. female.    Chief Complaint:  Menstrual Problem      History of Present Illness  HPI  Patient complains of irregular menses. She had been bleeding regularly while on OCP with menses lasting 5 days. Dysmenorrhea:mild, occurring first 1-2 days of flow. Cyclic symptoms include: none. Current contraception: none. Pt stopped taking OCP last month as she is ready to try for pregnancy.  Noted vaginal spotting 2 weeks after her period.    GYN & OB History  Patient's last menstrual period was 2024 (exact date).   Date of Last Pap: 2021    OB History    Para Term  AB Living   0 0 0 0 0 0   SAB IAB Ectopic Multiple Live Births   0 0 0 0         Review of Systems  Review of Systems   Constitutional:  Negative for activity change, appetite change, chills, fatigue, fever and unexpected weight change.   Respiratory:  Negative for shortness of breath.    Cardiovascular:  Negative for chest pain, palpitations and leg swelling.   Gastrointestinal:  Negative for abdominal pain, bloating, blood in stool, constipation, diarrhea, nausea and vomiting.   Genitourinary:  Positive for menstrual problem. Negative for dysmenorrhea, dyspareunia, dysuria, flank pain, frequency, genital sores, hematuria, menorrhagia, pelvic pain, urgency, vaginal bleeding, vaginal discharge, vaginal pain, urinary incontinence, postcoital bleeding, vaginal dryness and vaginal odor.   Musculoskeletal:  Negative for back pain.   Neurological:  Negative for syncope and headaches.        Objective:     Physical Exam:   Constitutional: She is oriented to person, place, and time. She appears well-developed and well-nourished. No distress.               Genitourinary:    Genitourinary Comments: UPT today Negative                 Neurological: She is alert and oriented to person, place, and time.     Psychiatric: She has a normal mood and affect. Her behavior is normal. Thought  content normal.         Assessment:     1. Irregular menses             Plan:     Irregular menses  -     POCT urine pregnancy  -     Pt just stopped taking OCP and current pattern may be resultant of recent discontinuation.  Recommend continued observation over the coming months.  Pt was reassured.  -     Begin daily PNV with folic acid.      Follow up in about 6 months (around 7/29/2024) for Annual exam.

## 2024-03-21 ENCOUNTER — TELEPHONE (OUTPATIENT)
Dept: OBSTETRICS AND GYNECOLOGY | Facility: CLINIC | Age: 29
End: 2024-03-21
Payer: COMMERCIAL

## 2024-03-21 NOTE — TELEPHONE ENCOUNTER
----- Message from Aylin Lund sent at 3/21/2024  1:42 PM CDT -----  Contact: Patient  Patient is calling to speak with the nurse reporting pain in her right side. Reports just finding out she is pregnant. Please call patient at .[phone]

## 2024-04-01 ENCOUNTER — OFFICE VISIT (OUTPATIENT)
Dept: OBSTETRICS AND GYNECOLOGY | Facility: CLINIC | Age: 29
End: 2024-04-01
Payer: COMMERCIAL

## 2024-04-01 ENCOUNTER — LAB VISIT (OUTPATIENT)
Dept: LAB | Facility: HOSPITAL | Age: 29
End: 2024-04-01
Attending: ADVANCED PRACTICE MIDWIFE
Payer: COMMERCIAL

## 2024-04-01 VITALS
DIASTOLIC BLOOD PRESSURE: 81 MMHG | HEIGHT: 60 IN | WEIGHT: 164.25 LBS | SYSTOLIC BLOOD PRESSURE: 138 MMHG | BODY MASS INDEX: 32.25 KG/M2

## 2024-04-01 DIAGNOSIS — Z32.01 POSITIVE PREGNANCY TEST: ICD-10-CM

## 2024-04-01 DIAGNOSIS — N91.2 AMENORRHEA: ICD-10-CM

## 2024-04-01 DIAGNOSIS — Z32.01 POSITIVE PREGNANCY TEST: Primary | ICD-10-CM

## 2024-04-01 LAB
B-HCG UR QL: POSITIVE
BASOPHILS # BLD AUTO: 0.06 K/UL (ref 0–0.2)
BASOPHILS NFR BLD: 0.5 % (ref 0–1.9)
BILIRUB UR QL STRIP: NEGATIVE
CLARITY UR REFRACT.AUTO: CLEAR
COLOR UR AUTO: YELLOW
CTP QC/QA: YES
DIFFERENTIAL METHOD BLD: ABNORMAL
EOSINOPHIL # BLD AUTO: 0.1 K/UL (ref 0–0.5)
EOSINOPHIL NFR BLD: 0.4 % (ref 0–8)
ERYTHROCYTE [DISTWIDTH] IN BLOOD BY AUTOMATED COUNT: 12.8 % (ref 11.5–14.5)
GLUCOSE UR QL STRIP: NEGATIVE
HCT VFR BLD AUTO: 39.1 % (ref 37–48.5)
HGB BLD-MCNC: 12.9 G/DL (ref 12–16)
HGB UR QL STRIP: NEGATIVE
IMM GRANULOCYTES # BLD AUTO: 0.06 K/UL (ref 0–0.04)
IMM GRANULOCYTES NFR BLD AUTO: 0.5 % (ref 0–0.5)
KETONES UR QL STRIP: NEGATIVE
LEUKOCYTE ESTERASE UR QL STRIP: NEGATIVE
LYMPHOCYTES # BLD AUTO: 3.5 K/UL (ref 1–4.8)
LYMPHOCYTES NFR BLD: 29.8 % (ref 18–48)
MCH RBC QN AUTO: 28.3 PG (ref 27–31)
MCHC RBC AUTO-ENTMCNC: 33 G/DL (ref 32–36)
MCV RBC AUTO: 86 FL (ref 82–98)
MONOCYTES # BLD AUTO: 0.7 K/UL (ref 0.3–1)
MONOCYTES NFR BLD: 5.9 % (ref 4–15)
NEUTROPHILS # BLD AUTO: 7.3 K/UL (ref 1.8–7.7)
NEUTROPHILS NFR BLD: 62.9 % (ref 38–73)
NITRITE UR QL STRIP: NEGATIVE
NRBC BLD-RTO: 0 /100 WBC
PH UR STRIP: 6 [PH] (ref 5–8)
PLATELET # BLD AUTO: 291 K/UL (ref 150–450)
PMV BLD AUTO: 10.1 FL (ref 9.2–12.9)
PROT UR QL STRIP: NEGATIVE
RBC # BLD AUTO: 4.56 M/UL (ref 4–5.4)
SP GR UR STRIP: 1.01 (ref 1–1.03)
URN SPEC COLLECT METH UR: NORMAL
UROBILINOGEN UR STRIP-ACNC: NEGATIVE EU/DL
WBC # BLD AUTO: 11.59 K/UL (ref 3.9–12.7)

## 2024-04-01 PROCEDURE — 99214 OFFICE O/P EST MOD 30 MIN: CPT | Mod: 25,S$GLB,, | Performed by: ADVANCED PRACTICE MIDWIFE

## 2024-04-01 PROCEDURE — 86762 RUBELLA ANTIBODY: CPT | Performed by: ADVANCED PRACTICE MIDWIFE

## 2024-04-01 PROCEDURE — 99999 PR PBB SHADOW E&M-EST. PATIENT-LVL III: CPT | Mod: PBBFAC,,, | Performed by: ADVANCED PRACTICE MIDWIFE

## 2024-04-01 PROCEDURE — 3079F DIAST BP 80-89 MM HG: CPT | Mod: CPTII,S$GLB,, | Performed by: ADVANCED PRACTICE MIDWIFE

## 2024-04-01 PROCEDURE — 3075F SYST BP GE 130 - 139MM HG: CPT | Mod: CPTII,S$GLB,, | Performed by: ADVANCED PRACTICE MIDWIFE

## 2024-04-01 PROCEDURE — 85025 COMPLETE CBC W/AUTO DIFF WBC: CPT | Mod: PO | Performed by: ADVANCED PRACTICE MIDWIFE

## 2024-04-01 PROCEDURE — 87491 CHLMYD TRACH DNA AMP PROBE: CPT | Performed by: ADVANCED PRACTICE MIDWIFE

## 2024-04-01 PROCEDURE — 86592 SYPHILIS TEST NON-TREP QUAL: CPT | Performed by: ADVANCED PRACTICE MIDWIFE

## 2024-04-01 PROCEDURE — 81003 URINALYSIS AUTO W/O SCOPE: CPT | Mod: PO | Performed by: ADVANCED PRACTICE MIDWIFE

## 2024-04-01 PROCEDURE — 87389 HIV-1 AG W/HIV-1&-2 AB AG IA: CPT | Performed by: ADVANCED PRACTICE MIDWIFE

## 2024-04-01 PROCEDURE — 3008F BODY MASS INDEX DOCD: CPT | Mod: CPTII,S$GLB,, | Performed by: ADVANCED PRACTICE MIDWIFE

## 2024-04-01 PROCEDURE — 86901 BLOOD TYPING SEROLOGIC RH(D): CPT | Performed by: ADVANCED PRACTICE MIDWIFE

## 2024-04-01 PROCEDURE — 80074 ACUTE HEPATITIS PANEL: CPT | Performed by: ADVANCED PRACTICE MIDWIFE

## 2024-04-01 PROCEDURE — 81025 URINE PREGNANCY TEST: CPT | Mod: S$GLB,,, | Performed by: ADVANCED PRACTICE MIDWIFE

## 2024-04-01 PROCEDURE — 85660 RBC SICKLE CELL TEST: CPT | Performed by: ADVANCED PRACTICE MIDWIFE

## 2024-04-01 PROCEDURE — 36415 COLL VENOUS BLD VENIPUNCTURE: CPT | Mod: PO | Performed by: ADVANCED PRACTICE MIDWIFE

## 2024-04-01 PROCEDURE — 1159F MED LIST DOCD IN RCRD: CPT | Mod: CPTII,S$GLB,, | Performed by: ADVANCED PRACTICE MIDWIFE

## 2024-04-01 NOTE — PROGRESS NOTES
CC: Absence of menses risk assessment Claritza Barragan 24    Saima Ruiz is a 29 y.o. female  presents with complaint of absence of menstruation.  She denies nausea/vomIting/abdominal pain/bleeding.  UPT is positive.    Menstrual History  Menarche 14, length 3-5, cycle 28  LMP 24, EDC 24, therefore 5w3d    History reviewed. No pertinent past medical history.  Past Surgical History:   Procedure Laterality Date    EYE SURGERY      wrist cyst removal Right      Social History     Socioeconomic History    Marital status:    Tobacco Use    Smoking status: Never    Smokeless tobacco: Never   Substance and Sexual Activity    Alcohol use: Yes     Comment: occ    Drug use: Never    Sexual activity: Yes     Partners: Male     Birth control/protection: OCP     Family History   Problem Relation Age of Onset    Breast cancer Maternal Grandmother 66    Colon cancer Neg Hx     Ovarian cancer Neg Hx      OB History    Para Term  AB Living   1 0 0 0 0 0   SAB IAB Ectopic Multiple Live Births   0 0 0 0        # Outcome Date GA Lbr John/2nd Weight Sex Delivery Anes PTL Lv   1 Current                /81   Ht 5' (1.524 m)   Wt 74.5 kg (164 lb 3.9 oz)   LMP 2024 (Exact Date)   BMI 32.08 kg/m²         ROS:  GENERAL: Denies weight gain or weight loss. Feeling well overall.   SKIN: Denies rash or lesions.   HEAD: Denies head injury or headache.   NODES: Denies enlarged lymph nodes.   CHEST: Denies chest pain or shortness of breath.   CARDIOVASCULAR: Denies palpitations or left sided chest pain.   ABDOMEN: No abdominal pain, constipation, diarrhea, nausea, vomiting or rectal bleeding.   URINARY: No frequency, dysuria, hematuria, or burning on urination.  REPRODUCTIVE: See HPI.   BREASTS: The patient performs breast self-examination and denies pain, lumps, or nipple discharge.   HEMATOLOGIC: No easy bruisability or excessive bleeding.   MUSCULOSKELETAL: Denies joint pain or  swelling.   NEUROLOGIC: Denies syncope or weakness.   PSYCHIATRIC: Denies depression, anxiety or mood swings.    PE:   APPEARANCE: Well nourished, well developed, in no acute distress.  AFFECT: WNL, alert and oriented x 3.  SKIN: No acne or hirsutism.  NECK: Neck symmetric without masses or thyromegaly.  NODES: No inguinal, cervical, axillary or femoral lymph node enlargement.  CHEST: Good respiratory effort.   ABDOMEN: Soft. No tenderness or masses. No hepatosplenomegaly. No hernias.  BREASTS: Symmetrical, no skin changes or visible lesions. No palpable masses, nipple discharge bilaterally.  PELVIC: Normal external female genitalia without lesions. Normal hair distribution. Adequate perineal body, normal urethral meatus. Vagina moist and well rugated without lesions or discharge. Cervix pink, without lesions, discharge or tenderness. No significant cystocele or rectocele. Bimanual exam shows uterus is 6 weeks, regular, mobile and nontender. Adnexa without masses or tenderness.  EXTREMITIES: No edema.          ASSESSMENT and PLAN:  29-year-old  1 para 0 with secondary amenorrhea and positive UPT.    LMP 2024 yielding EDC 2024 which compares with clinical findings.    Prenatal lab today.    GC chlamydia today.    Last Pap 2021-normal will repeat postpartum.    Prenatal vitamins prescribed.    First trimester expectations-information provided miscarriage precautions reviewed.    Return to office 2-3 weeks for dating ultrasound and new OB visit or p.r.n. problems       Information for review  -      Based on A.C.S. Pap guidelines, recommendation yearly pelvic exams, mammograms and monthly self breast exams; to see her PCP for other health maintenance and pregnancy.   -      Patient's medications and medical history reviewed with patient and implications in pregnancy.   -      Pregnancy course, visits every 4 weeks until 28 weeks, every 2 weeks until 36 weeks and weekly until delivery. Ultra  sound for dates in 1st trimester, anatomy ultrasound at 20-22 weeks and growth ulrasound at 36 weeks. This could be changed according to other pregnancy developments.  .Proper weight gain based on the [place] [employer] [facility]'s recommendations based on her pre-pregnancy weight.   Foods to avoid in pregnancy (i.e. sushi, fish that are high in mercury, deli meat, and unpasteurized cheeses).   Advise prenatal vitamin options (i.e. stool softener, DHA).   Discussed potential medical problems in pregnancy.  -     Oriented to practice including CNM collaboration.

## 2024-04-02 LAB
ABO + RH BLD: NORMAL
BLD GP AB SCN CELLS X3 SERPL QL: NORMAL
C TRACH DNA SPEC QL NAA+PROBE: NOT DETECTED
HAV IGM SERPL QL IA: NORMAL
HBV CORE IGM SERPL QL IA: NORMAL
HBV SURFACE AG SERPL QL IA: NORMAL
HCV AB SERPL QL IA: NORMAL
HGB S BLD QL SOLY: NEGATIVE
HIV 1+2 AB+HIV1 P24 AG SERPL QL IA: NEGATIVE
N GONORRHOEA DNA SPEC QL NAA+PROBE: NOT DETECTED
RPR SER QL: NORMAL
RUBV IGG SER-ACNC: 37.5 IU/ML
RUBV IGG SER-IMP: REACTIVE
SPECIMEN OUTDATE: NORMAL

## 2024-04-02 NOTE — PATIENT INSTRUCTIONS
Patient Education       Pregnancy - The Second Month   About this topic   It is important for you to learn how to take care of yourself to help you have a healthy baby and safe delivery. It is good to have health care throughout your pregnancy.  The second month of your pregnancy starts around week 5 and lasts through week 9. By knowing how far along you are, you can learn what is normal for this stage of your pregnancy and plan for what is next.  General   Your Body   During the second month of your pregnancy, here are some things you can expect.  There are more hormones active in your body and because of them, you may:  Have morning sickness or have an upset stomach at other times throughout the day  Feel just a little tired or need long naps each day  Go to the bathroom more often, even at night  Have tender or swollen breasts  Feel more emotional  Have color changes in nipples and areola  Have a brownish color over your forehead, temples, cheeks, and/or lips. This is melasma.  Your babys growth and development:  Your baby starts to develop organs like the brain, heart, liver, and lungs. Arms, legs, eyes, and ears are all starting to grow.  The heart beats about 150 times each minute by 6 weeks. Your doctor may be able to hear the heartbeat with a special tool by the end of this month.  Your baby has facial features, moves, and wiggles, but you won't be able to feel anything yet.  Your baby is about 1 inch (2.5 cm) long and is about the size of a blueberry.  Things to Think About   Avoid alcohol, drugs, tobacco products, and second hand smoke  Check with your doctor before taking any kind of drugs.  Ask about taking a vitamin. Take at least 400 micrograms of folic acid each day to help prevent some birth defects.  Making appointments with the doctor who will take care of you and your baby while you are pregnant.  Learn about what kinds of screening tests are offered while you are pregnant.  Is there anything I  need to do more or less of while I am pregnant?  Learn about which over the counter products are safe to use and take while you are pregnant.  Be sure to eat fresh, healthy foods while you are pregnant. Learn what foods are more likely to make you sick or have things that could harm your baby.  When do I need to call the doctor?   Not able to stop throwing up  Belly pain or cramps that keeps you from eating or sleeping  Period-like bleeding  Where can I learn more?   American Academy of Family Physicians  https://familydoctor.org/changes-in-your-body-during-pregnancy-first-trimester/   American Pregnancy Association  https://americanpregnancy.org/planning/first-prenatal-visit/   Last Reviewed Date   2020-04-20  Consumer Information Use and Disclaimer   This information is not specific medical advice and does not replace information you receive from your health care provider. This is only a brief summary of general information. It does NOT include all information about conditions, illnesses, injuries, tests, procedures, treatments, therapies, discharge instructions or life-style choices that may apply to you. You must talk with your health care provider for complete information about your health and treatment options. This information should not be used to decide whether or not to accept your health care providers advice, instructions or recommendations. Only your health care provider has the knowledge and training to provide advice that is right for you.  Copyright   Copyright © 2021 [employer] and its affiliates and/or licensors. All rights reserved.

## 2024-04-03 ENCOUNTER — TELEPHONE (OUTPATIENT)
Dept: OBSTETRICS AND GYNECOLOGY | Facility: CLINIC | Age: 29
End: 2024-04-03
Payer: COMMERCIAL

## 2024-04-03 NOTE — TELEPHONE ENCOUNTER
Called patient to reschedule her ultrasound and Initial OB appointment to 4/25/2024 at 8:20am and a 9am OB appointment with Ms Alvarado at the [place] location. Patient accepted appointment changes.

## 2024-04-19 ENCOUNTER — PATIENT MESSAGE (OUTPATIENT)
Dept: RESEARCH | Facility: HOSPITAL | Age: 29
End: 2024-04-19
Payer: COMMERCIAL

## 2024-04-25 ENCOUNTER — PROCEDURE VISIT (OUTPATIENT)
Dept: OBSTETRICS AND GYNECOLOGY | Facility: CLINIC | Age: 29
End: 2024-04-25
Payer: COMMERCIAL

## 2024-04-25 ENCOUNTER — INITIAL PRENATAL (OUTPATIENT)
Dept: OBSTETRICS AND GYNECOLOGY | Facility: CLINIC | Age: 29
End: 2024-04-25
Payer: COMMERCIAL

## 2024-04-25 VITALS — SYSTOLIC BLOOD PRESSURE: 100 MMHG | BODY MASS INDEX: 31.6 KG/M2 | DIASTOLIC BLOOD PRESSURE: 68 MMHG | WEIGHT: 161.81 LBS

## 2024-04-25 DIAGNOSIS — O34.11 LEIOMYOMA OF UTERUS AFFECTING PREGNANCY IN FIRST TRIMESTER: ICD-10-CM

## 2024-04-25 DIAGNOSIS — Z34.01 PRIMIGRAVIDA IN FIRST TRIMESTER: Primary | ICD-10-CM

## 2024-04-25 DIAGNOSIS — D25.9 LEIOMYOMA OF UTERUS AFFECTING PREGNANCY IN FIRST TRIMESTER: ICD-10-CM

## 2024-04-25 DIAGNOSIS — Z32.01 POSITIVE PREGNANCY TEST: ICD-10-CM

## 2024-04-25 PROBLEM — O34.10 UTERINE FIBROIDS AFFECTING PREGNANCY: Status: ACTIVE | Noted: 2024-04-25

## 2024-04-25 PROBLEM — Z34.00 PRIMIGRAVIDA: Status: ACTIVE | Noted: 2024-04-25

## 2024-04-25 PROCEDURE — 76801 OB US < 14 WKS SINGLE FETUS: CPT | Mod: S$GLB,,, | Performed by: OBSTETRICS & GYNECOLOGY

## 2024-04-25 PROCEDURE — 99999 PR PBB SHADOW E&M-EST. PATIENT-LVL III: CPT | Mod: PBBFAC,,, | Performed by: MIDWIFE

## 2024-04-25 PROCEDURE — 0502F SUBSEQUENT PRENATAL CARE: CPT | Mod: CPTII,S$GLB,, | Performed by: MIDWIFE

## 2024-04-30 ENCOUNTER — PATIENT MESSAGE (OUTPATIENT)
Dept: OBSTETRICS AND GYNECOLOGY | Facility: CLINIC | Age: 29
End: 2024-04-30
Payer: COMMERCIAL

## 2024-05-01 NOTE — PROGRESS NOTES
Primigravida in first trimester    Patient here for Initial OB visit and dating scan  U/S: single viable IUP. KAILEE based on CRL: 11/27/2024  Denies pain or vaginal bleeding. Reports mild nausea.   Reviewed Risk Assessment note and New OB labs.  Discussed genetic screening in pregnancy. Will offer [id number] next OV.  A to Z book given. Encouraged to reference as needed.   Bleeding and pain precautions reviewed  RTC in 4 weeks, sooner if needed     Leiomyoma of uterus affecting pregnancy in first trimester     Fundal: Vol 30.338cm3  Posterior: Vol 0.6147cf4  Growth scan at 32 weeks  
How Severe Are Your Spot(S)?: moderate
What Type Of Note Output Would You Prefer (Optional)?: Bullet Format
What Is The Reason For Today's Visit?: Full Body Skin Examination
What Is The Reason For Today's Visit? (Being Monitored For X): concerning skin lesions on an annual basis

## 2024-05-10 ENCOUNTER — ROUTINE PRENATAL (OUTPATIENT)
Dept: OBSTETRICS AND GYNECOLOGY | Facility: CLINIC | Age: 29
End: 2024-05-10
Payer: COMMERCIAL

## 2024-05-10 VITALS
WEIGHT: 162.25 LBS | SYSTOLIC BLOOD PRESSURE: 138 MMHG | DIASTOLIC BLOOD PRESSURE: 88 MMHG | BODY MASS INDEX: 31.69 KG/M2

## 2024-05-10 DIAGNOSIS — O34.11 LEIOMYOMA OF UTERUS AFFECTING PREGNANCY IN FIRST TRIMESTER: ICD-10-CM

## 2024-05-10 DIAGNOSIS — O26.899 ABDOMINAL CRAMPING COMPLICATING PREGNANCY: Primary | ICD-10-CM

## 2024-05-10 DIAGNOSIS — R10.9 ABDOMINAL CRAMPING COMPLICATING PREGNANCY: Primary | ICD-10-CM

## 2024-05-10 DIAGNOSIS — Z34.01 PRIMIGRAVIDA IN FIRST TRIMESTER: ICD-10-CM

## 2024-05-10 DIAGNOSIS — D25.9 LEIOMYOMA OF UTERUS AFFECTING PREGNANCY IN FIRST TRIMESTER: ICD-10-CM

## 2024-05-10 LAB
BILIRUBIN, UA POC OHS: NEGATIVE
BLOOD, UA POC OHS: NEGATIVE
CLARITY, UA POC OHS: CLEAR
COLOR, UA POC OHS: YELLOW
GLUCOSE, UA POC OHS: NEGATIVE
KETONES, UA POC OHS: 40
LEUKOCYTES, UA POC OHS: NEGATIVE
NITRITE, UA POC OHS: NEGATIVE
PH, UA POC OHS: 5.5
PROTEIN, UA POC OHS: NEGATIVE
SPECIFIC GRAVITY, UA POC OHS: >=1.03
UROBILINOGEN, UA POC OHS: 0.2

## 2024-05-10 PROCEDURE — 99999 PR PBB SHADOW E&M-EST. PATIENT-LVL III: CPT | Mod: PBBFAC,,, | Performed by: OBSTETRICS & GYNECOLOGY

## 2024-05-10 PROCEDURE — 0502F SUBSEQUENT PRENATAL CARE: CPT | Mod: S$GLB,,, | Performed by: OBSTETRICS & GYNECOLOGY

## 2024-05-10 NOTE — PROGRESS NOTES
Pt complains of lower abdominal cramping since last night.  Denies vaginal bleeding, discharge, dysuria, frequency, diarrhea, constipation, trauma, anorexia.  Denies recent intercourse.    PE:  Alert in no distress  Abd soft, non-tender, no rebound    UA today negative    A/P:  Abdominal cramping complicating pregnancy  -     POCT Urinalysis(Instrument)  -     Likely associated with fibroids.  Pt reassured and advised on warning signs.  -     Keep follow up as scheduled.    Leiomyoma of uterus affecting pregnancy in first trimester    Primigravida in first trimester

## 2024-05-15 ENCOUNTER — PATIENT MESSAGE (OUTPATIENT)
Dept: ADMINISTRATIVE | Facility: OTHER | Age: 29
End: 2024-05-15
Payer: COMMERCIAL

## 2024-05-27 ENCOUNTER — ROUTINE PRENATAL (OUTPATIENT)
Dept: OBSTETRICS AND GYNECOLOGY | Facility: CLINIC | Age: 29
End: 2024-05-27
Payer: COMMERCIAL

## 2024-05-27 VITALS
SYSTOLIC BLOOD PRESSURE: 128 MMHG | DIASTOLIC BLOOD PRESSURE: 84 MMHG | BODY MASS INDEX: 31.52 KG/M2 | WEIGHT: 161.38 LBS

## 2024-05-27 DIAGNOSIS — Z3A.13 13 WEEKS GESTATION OF PREGNANCY: Primary | ICD-10-CM

## 2024-05-27 DIAGNOSIS — O34.12 LEIOMYOMA OF UTERUS AFFECTING PREGNANCY IN SECOND TRIMESTER: ICD-10-CM

## 2024-05-27 DIAGNOSIS — Z36.89 ENCOUNTER FOR FETAL ANATOMIC SURVEY: ICD-10-CM

## 2024-05-27 DIAGNOSIS — D25.9 LEIOMYOMA OF UTERUS AFFECTING PREGNANCY IN SECOND TRIMESTER: ICD-10-CM

## 2024-05-27 PROCEDURE — 0502F SUBSEQUENT PRENATAL CARE: CPT | Mod: CPTII,S$GLB,, | Performed by: MIDWIFE

## 2024-05-27 PROCEDURE — 99999 PR PBB SHADOW E&M-EST. PATIENT-LVL III: CPT | Mod: PBBFAC,,, | Performed by: MIDWIFE

## 2024-05-27 NOTE — PROGRESS NOTES
13 weeks gestation of pregnancy    Doing well, no complaints  Abdominal pain resolved. No vaginal bleeding.  Genetic screening not covered by insurance, so declines at this time  Plans to use Dr. Mayers in [facility] for pediatricia, pedi packet given  Connected MOM active  TWG: -2lb 13.9oz TWG  Bleeding and pain precautions  RTC in 4 weeks, sooner if needed    Leiomyoma of uterus affecting pregnancy in second trimester    Encounter for fetal anatomic survey  -     US OB/GYN Procedure ([facility])-Future; Future

## 2024-06-12 ENCOUNTER — PATIENT MESSAGE (OUTPATIENT)
Dept: OTHER | Facility: OTHER | Age: 29
End: 2024-06-12
Payer: COMMERCIAL

## 2024-06-19 ENCOUNTER — PATIENT MESSAGE (OUTPATIENT)
Dept: OTHER | Facility: OTHER | Age: 29
End: 2024-06-19
Payer: COMMERCIAL

## 2024-06-26 ENCOUNTER — ROUTINE PRENATAL (OUTPATIENT)
Dept: OBSTETRICS AND GYNECOLOGY | Facility: CLINIC | Age: 29
End: 2024-06-26
Payer: COMMERCIAL

## 2024-06-26 VITALS
SYSTOLIC BLOOD PRESSURE: 116 MMHG | DIASTOLIC BLOOD PRESSURE: 78 MMHG | BODY MASS INDEX: 32.21 KG/M2 | WEIGHT: 164.88 LBS

## 2024-06-26 DIAGNOSIS — Z3A.18 18 WEEKS GESTATION OF PREGNANCY: Primary | ICD-10-CM

## 2024-06-26 DIAGNOSIS — O34.12 LEIOMYOMA OF UTERUS AFFECTING PREGNANCY IN SECOND TRIMESTER: ICD-10-CM

## 2024-06-26 DIAGNOSIS — D25.9 LEIOMYOMA OF UTERUS AFFECTING PREGNANCY IN SECOND TRIMESTER: ICD-10-CM

## 2024-06-26 PROCEDURE — 99999 PR PBB SHADOW E&M-EST. PATIENT-LVL III: CPT | Mod: PBBFAC,,, | Performed by: MIDWIFE

## 2024-06-26 NOTE — PROGRESS NOTES
18 weeks gestation of pregnancy  -     BREAST PUMP FOR HOME USE    Doing well, no complaints  + fetal movement  Denies cramping, VB, LOF  Discussed birth plan. Patient desires an epidural for pain management. Discussed rooming in, skin to skin, delayed cord clamping, and magic first hour.   Plans to breastfeed -- pump Rx and instructions given  10.6oz TWG  Anatomy scan info sheet given  2T precautions  RTC in 2-3 weeks with anatomy scan, sooner if needed    Leiomyoma of uterus affecting pregnancy in second trimester     Growth scan at 32 weeks

## 2024-07-05 ENCOUNTER — PATIENT MESSAGE (OUTPATIENT)
Dept: OBSTETRICS AND GYNECOLOGY | Facility: CLINIC | Age: 29
End: 2024-07-05
Payer: COMMERCIAL

## 2024-07-10 ENCOUNTER — PATIENT MESSAGE (OUTPATIENT)
Dept: OTHER | Facility: OTHER | Age: 29
End: 2024-07-10
Payer: COMMERCIAL

## 2024-07-16 ENCOUNTER — PROCEDURE VISIT (OUTPATIENT)
Dept: OBSTETRICS AND GYNECOLOGY | Facility: CLINIC | Age: 29
End: 2024-07-16
Payer: COMMERCIAL

## 2024-07-16 ENCOUNTER — ROUTINE PRENATAL (OUTPATIENT)
Dept: OBSTETRICS AND GYNECOLOGY | Facility: CLINIC | Age: 29
End: 2024-07-16
Payer: COMMERCIAL

## 2024-07-16 VITALS
DIASTOLIC BLOOD PRESSURE: 72 MMHG | BODY MASS INDEX: 33.07 KG/M2 | SYSTOLIC BLOOD PRESSURE: 116 MMHG | WEIGHT: 169.31 LBS

## 2024-07-16 DIAGNOSIS — D25.9 LEIOMYOMA OF UTERUS AFFECTING PREGNANCY IN SECOND TRIMESTER: ICD-10-CM

## 2024-07-16 DIAGNOSIS — O34.12 LEIOMYOMA OF UTERUS AFFECTING PREGNANCY IN SECOND TRIMESTER: ICD-10-CM

## 2024-07-16 DIAGNOSIS — Z36.89 ENCOUNTER FOR FETAL ANATOMIC SURVEY: ICD-10-CM

## 2024-07-16 DIAGNOSIS — Z36.2 ENCOUNTER FOR FOLLOW-UP ULTRASOUND OF FETAL ANATOMY: ICD-10-CM

## 2024-07-16 DIAGNOSIS — F43.23 SITUATIONAL MIXED ANXIETY AND DEPRESSIVE DISORDER: ICD-10-CM

## 2024-07-16 DIAGNOSIS — Z3A.20 20 WEEKS GESTATION OF PREGNANCY: Primary | ICD-10-CM

## 2024-07-16 PROCEDURE — 0502F SUBSEQUENT PRENATAL CARE: CPT | Mod: CPTII,S$GLB,,

## 2024-07-16 PROCEDURE — 76805 OB US >/= 14 WKS SNGL FETUS: CPT | Mod: S$GLB,,, | Performed by: OBSTETRICS & GYNECOLOGY

## 2024-07-16 PROCEDURE — 99999 PR PBB SHADOW E&M-EST. PATIENT-LVL II: CPT | Mod: PBBFAC,,,

## 2024-07-16 NOTE — PROGRESS NOTES
29 y.o. female  at 20w6d   is feeling flutters /FM, denies VB, LOF or cramping  Doing well without concerns. Plans to travel for baby [place] in early August.  Reviewed travel precautions. Patient verbalized understanding   TW lbs   Reviewed anatomy US: , vertex, right lateral placenta, 3VC, NEWTON wNL, EFW 370g (13oz), 26%ile, CL 34.7mm, fibroid noted 45 x 45 x 54mm, suboptimal views. F/u NV       20 weeks gestation of pregnancy  - routine PNC   Encounter for follow-up ultrasound of fetal anatomy  -     US OB/GYN Procedure ([facility])-Future; Future    Situational mixed anxiety and depressive disorder  - no meds currently  Leiomyoma of uterus affecting pregnancy in second trimester  - measuring 45 x 45 x 54mm  - US at 32w       Reviewed warning signs, normal FM,  labor precautions and how/when to call. Patient states understanding.  RTC x 4 wks, call or present sooner prn.

## 2024-08-07 ENCOUNTER — PATIENT MESSAGE (OUTPATIENT)
Dept: OTHER | Facility: OTHER | Age: 29
End: 2024-08-07
Payer: COMMERCIAL

## 2024-08-08 ENCOUNTER — PROCEDURE VISIT (OUTPATIENT)
Dept: OBSTETRICS AND GYNECOLOGY | Facility: CLINIC | Age: 29
End: 2024-08-08
Payer: COMMERCIAL

## 2024-08-08 ENCOUNTER — ROUTINE PRENATAL (OUTPATIENT)
Dept: OBSTETRICS AND GYNECOLOGY | Facility: CLINIC | Age: 29
End: 2024-08-08
Payer: COMMERCIAL

## 2024-08-08 VITALS
BODY MASS INDEX: 33.71 KG/M2 | WEIGHT: 172.63 LBS | SYSTOLIC BLOOD PRESSURE: 112 MMHG | DIASTOLIC BLOOD PRESSURE: 72 MMHG

## 2024-08-08 DIAGNOSIS — Z36.2 ENCOUNTER FOR FOLLOW-UP ULTRASOUND OF FETAL ANATOMY: ICD-10-CM

## 2024-08-08 DIAGNOSIS — R00.2 HEART PALPITATIONS: ICD-10-CM

## 2024-08-08 DIAGNOSIS — Z3A.24 24 WEEKS GESTATION OF PREGNANCY: Primary | ICD-10-CM

## 2024-08-08 DIAGNOSIS — D25.9 LEIOMYOMA OF UTERUS AFFECTING PREGNANCY IN SECOND TRIMESTER: ICD-10-CM

## 2024-08-08 DIAGNOSIS — O34.12 LEIOMYOMA OF UTERUS AFFECTING PREGNANCY IN SECOND TRIMESTER: ICD-10-CM

## 2024-08-08 PROCEDURE — 76816 OB US FOLLOW-UP PER FETUS: CPT | Mod: S$GLB,,, | Performed by: OBSTETRICS & GYNECOLOGY

## 2024-08-08 PROCEDURE — 99999 PR PBB SHADOW E&M-EST. PATIENT-LVL III: CPT | Mod: PBBFAC,,,

## 2024-08-08 PROCEDURE — 0502F SUBSEQUENT PRENATAL CARE: CPT | Mod: CPTII,S$GLB,,

## 2024-08-19 NOTE — PROGRESS NOTES
29 y.o. female  at 24w1d   Reports + FM, denies VB, LOF, or cramping  Doing well. C/o heart palpitations x2 weeks. Denies cardiac disease. Referral placed.   US reviwed: FHR, 153, vertex, left lateral placenta, 3VC, NEWTON WNL, MVP 6.0cm, eFW 643g (1lb 7oz), 37%ile, fibroids: anterior 34 x 14 x 37mm, left lateral wall 54 x 40 x 49mm, left lateral wall 42 x 22 x 23mm, anatomy appears complete, will await MFM review  TW lbs   Breast pump Rx: already done    Reviewed upcoming 28wk labs, (B [facility]) and orders placed, tdap handout provided and explained    24 weeks gestation of pregnancy  -     OB Glucose Screen; Future; Expected date: 2024  -     CBC auto differential; Future; Expected date: 2024  -     HIV 1/2 Ag/Ab (4th Gen); Future; Expected date: 2024  -     Treponema Pallidium Antibodies IgG, IgM; Future; Expected date: 2024  -      routine PNC     Heart palpitations  -     Ambulatory referral/consult to Cardiology; Future; Expected date: 08/15/2024    Leiomyoma of uterus affecting pregnancy in second trimester  - growth at 32w       Reviewed warning signs, normal FM,  labor precautions and how/when to call. Patient states understanding.  RTC x 4 wks, call or present sooner prn.

## 2024-08-21 ENCOUNTER — PATIENT MESSAGE (OUTPATIENT)
Dept: OTHER | Facility: OTHER | Age: 29
End: 2024-08-21
Payer: COMMERCIAL

## 2024-08-27 ENCOUNTER — HOSPITAL ENCOUNTER (OUTPATIENT)
Facility: HOSPITAL | Age: 29
LOS: 1 days | Discharge: HOME | End: 2024-08-29
Attending: OBSTETRICS & GYNECOLOGY | Admitting: OBSTETRICS & GYNECOLOGY
Payer: COMMERCIAL

## 2024-08-27 ENCOUNTER — PATIENT MESSAGE (OUTPATIENT)
Dept: OBSTETRICS AND GYNECOLOGY | Facility: CLINIC | Age: 29
End: 2024-08-27
Payer: COMMERCIAL

## 2024-08-27 ENCOUNTER — TELEPHONE (OUTPATIENT)
Dept: OBSTETRICS AND GYNECOLOGY | Facility: CLINIC | Age: 29
End: 2024-08-27
Payer: COMMERCIAL

## 2024-08-27 DIAGNOSIS — O47.00 PRETERM CONTRACTIONS: Primary | ICD-10-CM

## 2024-08-27 PROBLEM — N76.0 BV (BACTERIAL VAGINOSIS): Status: ACTIVE | Noted: 2024-08-27

## 2024-08-27 PROBLEM — B96.89 BV (BACTERIAL VAGINOSIS): Status: ACTIVE | Noted: 2024-08-27

## 2024-08-27 LAB
BILIRUB UR QL STRIP: NEGATIVE
CLARITY UR: CLEAR
COLOR UR: COLORLESS
GLUCOSE UR QL STRIP: NEGATIVE
HGB UR QL STRIP: NEGATIVE
KETONES UR QL STRIP: NEGATIVE
LEUKOCYTE ESTERASE UR QL STRIP: NEGATIVE
NITRITE UR QL STRIP: NEGATIVE
PH UR STRIP: 7 [PH] (ref 5–8)
PROT UR QL STRIP: NEGATIVE
SP GR UR STRIP: 1.01 (ref 1–1.03)
URN SPEC COLLECT METH UR: ABNORMAL
UROBILINOGEN UR STRIP-ACNC: NEGATIVE EU/DL

## 2024-08-27 PROCEDURE — 87210 SMEAR WET MOUNT SALINE/INK: CPT | Mod: QW,,, | Performed by: ADVANCED PRACTICE MIDWIFE

## 2024-08-27 PROCEDURE — 81003 URINALYSIS AUTO W/O SCOPE: CPT | Performed by: ADVANCED PRACTICE MIDWIFE

## 2024-08-27 PROCEDURE — 87389 HIV-1 AG W/HIV-1&-2 AB AG IA: CPT | Performed by: ADVANCED PRACTICE MIDWIFE

## 2024-08-27 PROCEDURE — 99211 OFF/OP EST MAY X REQ PHY/QHP: CPT

## 2024-08-27 PROCEDURE — 80053 COMPREHEN METABOLIC PANEL: CPT | Performed by: ADVANCED PRACTICE MIDWIFE

## 2024-08-27 PROCEDURE — 82731 ASSAY OF FETAL FIBRONECTIN: CPT | Performed by: ADVANCED PRACTICE MIDWIFE

## 2024-08-27 PROCEDURE — 25000003 PHARM REV CODE 250: Performed by: ADVANCED PRACTICE MIDWIFE

## 2024-08-27 PROCEDURE — 99214 OFFICE O/P EST MOD 30 MIN: CPT | Mod: ,,, | Performed by: ADVANCED PRACTICE MIDWIFE

## 2024-08-27 PROCEDURE — 86901 BLOOD TYPING SEROLOGIC RH(D): CPT | Performed by: ADVANCED PRACTICE MIDWIFE

## 2024-08-27 PROCEDURE — 86900 BLOOD TYPING SEROLOGIC ABO: CPT | Performed by: ADVANCED PRACTICE MIDWIFE

## 2024-08-27 PROCEDURE — 72100002 HC LABOR CARE, 1ST 8 HOURS

## 2024-08-27 PROCEDURE — 87653 STREP B DNA AMP PROBE: CPT | Performed by: ADVANCED PRACTICE MIDWIFE

## 2024-08-27 PROCEDURE — 63600175 PHARM REV CODE 636 W HCPCS: Mod: JZ,JG | Performed by: ADVANCED PRACTICE MIDWIFE

## 2024-08-27 PROCEDURE — 86593 SYPHILIS TEST NON-TREP QUANT: CPT | Performed by: ADVANCED PRACTICE MIDWIFE

## 2024-08-27 PROCEDURE — 85025 COMPLETE CBC W/AUTO DIFF WBC: CPT | Performed by: ADVANCED PRACTICE MIDWIFE

## 2024-08-27 RX ORDER — ACETAMINOPHEN 500 MG
500 TABLET ORAL EVERY 6 HOURS PRN
Status: DISCONTINUED | OUTPATIENT
Start: 2024-08-27 | End: 2024-08-29 | Stop reason: HOSPADM

## 2024-08-27 RX ORDER — MAGNESIUM SULFATE HEPTAHYDRATE 40 MG/ML
2 INJECTION, SOLUTION INTRAVENOUS CONTINUOUS
Status: DISCONTINUED | OUTPATIENT
Start: 2024-08-28 | End: 2024-08-29 | Stop reason: HOSPADM

## 2024-08-27 RX ORDER — SODIUM CHLORIDE, SODIUM LACTATE, POTASSIUM CHLORIDE, CALCIUM CHLORIDE 600; 310; 30; 20 MG/100ML; MG/100ML; MG/100ML; MG/100ML
INJECTION, SOLUTION INTRAVENOUS CONTINUOUS
Status: DISCONTINUED | OUTPATIENT
Start: 2024-08-27 | End: 2024-08-29 | Stop reason: HOSPADM

## 2024-08-27 RX ORDER — FLUCONAZOLE 150 MG/1
150 TABLET ORAL ONCE
Status: COMPLETED | OUTPATIENT
Start: 2024-08-27 | End: 2024-08-27

## 2024-08-27 RX ORDER — BETAMETHASONE SODIUM PHOSPHATE AND BETAMETHASONE ACETATE 3; 3 MG/ML; MG/ML
12 INJECTION, SUSPENSION INTRA-ARTICULAR; INTRALESIONAL; INTRAMUSCULAR; SOFT TISSUE EVERY 24 HOURS
Status: DISCONTINUED | OUTPATIENT
Start: 2024-08-28 | End: 2024-08-28

## 2024-08-27 RX ORDER — METRONIDAZOLE 500 MG/100ML
500 INJECTION, SOLUTION INTRAVENOUS ONCE
Status: COMPLETED | OUTPATIENT
Start: 2024-08-27 | End: 2024-08-27

## 2024-08-27 RX ORDER — MAGNESIUM SULFATE HEPTAHYDRATE 40 MG/ML
6 INJECTION, SOLUTION INTRAVENOUS ONCE
Status: COMPLETED | OUTPATIENT
Start: 2024-08-28 | End: 2024-08-28

## 2024-08-27 RX ORDER — ONDANSETRON 8 MG/1
8 TABLET, ORALLY DISINTEGRATING ORAL EVERY 8 HOURS PRN
Status: DISCONTINUED | OUTPATIENT
Start: 2024-08-27 | End: 2024-08-29 | Stop reason: HOSPADM

## 2024-08-27 RX ORDER — SODIUM CHLORIDE 0.9 % (FLUSH) 0.9 %
10 SYRINGE (ML) INJECTION
Status: DISCONTINUED | OUTPATIENT
Start: 2024-08-28 | End: 2024-08-29 | Stop reason: HOSPADM

## 2024-08-27 RX ADMIN — FLUCONAZOLE 150 MG: 150 TABLET ORAL at 09:08

## 2024-08-27 RX ADMIN — METRONIDAZOLE 500 MG: 5 INJECTION, SOLUTION INTRAVENOUS at 09:08

## 2024-08-27 RX ADMIN — SODIUM CHLORIDE, POTASSIUM CHLORIDE, SODIUM LACTATE AND CALCIUM CHLORIDE: 600; 310; 30; 20 INJECTION, SOLUTION INTRAVENOUS at 09:08

## 2024-08-27 NOTE — TELEPHONE ENCOUNTER
Received call from Sybil with appt scheduling asking to transfer a patient. Accepted call. Verified 2 patient identifiers. Patient states she has been experiencing some mild cramping. States it has been inconsistent, no more than twice in an hour. States cramps feel like she is getting ready to start a period. I informed patient of cramping info from pregnancy a-z book. Recommended soaking in warm water to help relax her muscles. Patient states she has an appointment tomorrow. Patient states she will keep the appointment for tomorrow to see a provider.

## 2024-08-28 LAB
ABO + RH BLD: NORMAL
ALBUMIN SERPL BCP-MCNC: 3.3 G/DL (ref 3.5–5.2)
ALP SERPL-CCNC: 131 U/L (ref 55–135)
ALT SERPL W/O P-5'-P-CCNC: 20 U/L (ref 10–44)
ANION GAP SERPL CALC-SCNC: 14 MMOL/L (ref 8–16)
AST SERPL-CCNC: 19 U/L (ref 10–40)
BASOPHILS # BLD AUTO: 0.08 K/UL (ref 0–0.2)
BASOPHILS NFR BLD: 0.5 % (ref 0–1.9)
BILIRUB SERPL-MCNC: 0.3 MG/DL (ref 0.1–1)
BLD GP AB SCN CELLS X3 SERPL QL: NORMAL
BUN SERPL-MCNC: 7 MG/DL (ref 6–20)
CALCIUM SERPL-MCNC: 9.5 MG/DL (ref 8.7–10.5)
CHLORIDE SERPL-SCNC: 106 MMOL/L (ref 95–110)
CO2 SERPL-SCNC: 18 MMOL/L (ref 23–29)
CREAT SERPL-MCNC: 0.8 MG/DL (ref 0.5–1.4)
DIFFERENTIAL METHOD BLD: ABNORMAL
EOSINOPHIL # BLD AUTO: 0.1 K/UL (ref 0–0.5)
EOSINOPHIL NFR BLD: 0.6 % (ref 0–8)
ERYTHROCYTE [DISTWIDTH] IN BLOOD BY AUTOMATED COUNT: 13.8 % (ref 11.5–14.5)
EST. GFR  (NO RACE VARIABLE): >60 ML/MIN/1.73 M^2
FIBRONECTIN FETAL SPEC QL: NEGATIVE
GLUCOSE SERPL-MCNC: 65 MG/DL (ref 70–110)
HCT VFR BLD AUTO: 37.8 % (ref 37–48.5)
HGB BLD-MCNC: 12.5 G/DL (ref 12–16)
HIV 1+2 AB+HIV1 P24 AG SERPL QL IA: NEGATIVE
IMM GRANULOCYTES # BLD AUTO: 0.23 K/UL (ref 0–0.04)
IMM GRANULOCYTES NFR BLD AUTO: 1.4 % (ref 0–0.5)
LYMPHOCYTES # BLD AUTO: 4.2 K/UL (ref 1–4.8)
LYMPHOCYTES NFR BLD: 25.1 % (ref 18–48)
MCH RBC QN AUTO: 28.9 PG (ref 27–31)
MCHC RBC AUTO-ENTMCNC: 33.1 G/DL (ref 32–36)
MCV RBC AUTO: 88 FL (ref 82–98)
MONOCYTES # BLD AUTO: 1.2 K/UL (ref 0.3–1)
MONOCYTES NFR BLD: 7.1 % (ref 4–15)
NEUTROPHILS # BLD AUTO: 11 K/UL (ref 1.8–7.7)
NEUTROPHILS NFR BLD: 65.3 % (ref 38–73)
NRBC BLD-RTO: 0 /100 WBC
PLATELET # BLD AUTO: 234 K/UL (ref 150–450)
PMV BLD AUTO: 12.1 FL (ref 9.2–12.9)
POTASSIUM SERPL-SCNC: 3.7 MMOL/L (ref 3.5–5.1)
PROT SERPL-MCNC: 7.5 G/DL (ref 6–8.4)
RBC # BLD AUTO: 4.32 M/UL (ref 4–5.4)
SODIUM SERPL-SCNC: 138 MMOL/L (ref 136–145)
SPECIMEN OUTDATE: NORMAL
TREPONEMA PALLIDUM IGG+IGM AB [PRESENCE] IN SERUM OR PLASMA BY IMMUNOASSAY: NONREACTIVE
WBC # BLD AUTO: 16.76 K/UL (ref 3.9–12.7)

## 2024-08-28 PROCEDURE — 25000003 PHARM REV CODE 250: Performed by: ADVANCED PRACTICE MIDWIFE

## 2024-08-28 PROCEDURE — 63600175 PHARM REV CODE 636 W HCPCS: Performed by: ADVANCED PRACTICE MIDWIFE

## 2024-08-28 PROCEDURE — 11000001 HC ACUTE MED/SURG PRIVATE ROOM

## 2024-08-28 PROCEDURE — 99232 SBSQ HOSP IP/OBS MODERATE 35: CPT | Mod: ,,, | Performed by: OBSTETRICS & GYNECOLOGY

## 2024-08-28 PROCEDURE — 63600175 PHARM REV CODE 636 W HCPCS: Performed by: OBSTETRICS & GYNECOLOGY

## 2024-08-28 PROCEDURE — 72100003 HC LABOR CARE, EA. ADDL. 8 HRS

## 2024-08-28 PROCEDURE — 25000003 PHARM REV CODE 250: Performed by: OBSTETRICS & GYNECOLOGY

## 2024-08-28 PROCEDURE — 51702 INSERT TEMP BLADDER CATH: CPT

## 2024-08-28 RX ORDER — BETAMETHASONE SODIUM PHOSPHATE AND BETAMETHASONE ACETATE 3; 3 MG/ML; MG/ML
12 INJECTION, SUSPENSION INTRA-ARTICULAR; INTRALESIONAL; INTRAMUSCULAR; SOFT TISSUE
Status: COMPLETED | OUTPATIENT
Start: 2024-08-28 | End: 2024-08-29

## 2024-08-28 RX ORDER — HYDROXYZINE PAMOATE 25 MG/1
25 CAPSULE ORAL ONCE
Status: COMPLETED | OUTPATIENT
Start: 2024-08-28 | End: 2024-08-28

## 2024-08-28 RX ORDER — ZOLPIDEM TARTRATE 5 MG/1
5 TABLET ORAL NIGHTLY PRN
Status: DISCONTINUED | OUTPATIENT
Start: 2024-08-28 | End: 2024-08-29 | Stop reason: HOSPADM

## 2024-08-28 RX ADMIN — MAGNESIUM SULFATE HEPTAHYDRATE 2 G/HR: 40 INJECTION, SOLUTION INTRAVENOUS at 03:08

## 2024-08-28 RX ADMIN — MAGNESIUM SULFATE HEPTAHYDRATE 6 G: 40 INJECTION, SOLUTION INTRAVENOUS at 12:08

## 2024-08-28 RX ADMIN — BETAMETHASONE SODIUM PHOSPHATE AND BETAMETHASONE ACETATE 12 MG: 3; 3 INJECTION, SUSPENSION INTRA-ARTICULAR; INTRALESIONAL; INTRAMUSCULAR at 12:08

## 2024-08-28 RX ADMIN — ACETAMINOPHEN 500 MG: 500 TABLET ORAL at 05:08

## 2024-08-28 RX ADMIN — HYDROXYZINE PAMOATE 25 MG: 25 CAPSULE ORAL at 03:08

## 2024-08-28 RX ADMIN — ZOLPIDEM TARTRATE 5 MG: 5 TABLET ORAL at 09:08

## 2024-08-28 NOTE — ASSESSMENT & PLAN NOTE
8/27/24 2115  FHR reassuring, contractions q2-3 mins  Speculum exam performed: cervix C/T/H  Wet prep +clue cells and yeast hyphae  IV hydration, IV flagyl, diflucan  Continue to monitor closely

## 2024-08-28 NOTE — SUBJECTIVE & OBJECTIVE
Obstetric HPI:  Patient reports None contractions, active fetal movement, absent vaginal bleeding , absent loss of fluid      Objective:     Vital Signs (Most Recent):  Temp: 99.1 °F (37.3 °C) (08/28/24 0703)  Pulse: 105 (08/28/24 0806)  Resp: 18 (08/28/24 0802)  BP: 127/75 (08/28/24 0802)  SpO2: 100 % (08/28/24 0806) Vital Signs (24h Range):  Temp:  [98.2 °F (36.8 °C)-99.1 °F (37.3 °C)] 99.1 °F (37.3 °C)  Pulse:  [] 105  Resp:  [18] 18  SpO2:  [95 %-100 %] 100 %  BP: (125-143)/(58-94) 127/75        There is no height or weight on file to calculate BMI.    FHT: Cat 1 (reassuring)  TOCO:  Rare contractions      Intake/Output Summary (Last 24 hours) at 8/28/2024 0938  Last data filed at 8/28/2024 0802  Gross per 24 hour   Intake --   Output 2150 ml   Net -2150 ml         Significant Labs:  Recent Lab Results         08/27/24  2351   08/27/24  2259   08/27/24  2059        Albumin 3.3                      ALT 20           Anion Gap 14           Appearance, UA     Clear       AST 19           Baso # 0.08           Basophil % 0.5           Bilirubin (UA)     Negative       BILIRUBIN TOTAL 0.3  Comment: For infants and newborns, interpretation of results should be based  on gestational age, weight and in agreement with clinical  observations.    Premature Infant recommended reference ranges:  Up to 24 hours.............<8.0 mg/dL  Up to 48 hours............<12.0 mg/dL  3-5 days..................<15.0 mg/dL  6-29 days.................<15.0 mg/dL             BUN 7           Calcium 9.5           Chloride 106           CO2 18           Color, UA     Colorless       Creatinine 0.8           Differential Method Automated           eGFR >60           Eos # 0.1           Eos % 0.6           Fetal Fibronectin   Negative         Glucose 65           Glucose, UA     Negative       Gran # (ANC) 11.0           Gran % 65.3           Group & Rh B POS           Hematocrit 37.8           Hemoglobin 12.5           HIV 1/2  Ag/Ab Negative           Immature Grans (Abs) 0.23  Comment: Mild elevation in immature granulocytes is non specific and   can be seen in a variety of conditions including stress response,   acute inflammation, trauma and pregnancy. Correlation with other   laboratory and clinical findings is essential.             Immature Granulocytes 1.4           INDIRECT AKANKSHA NEG           Ketones, UA     Negative       Leukocyte Esterase, UA     Negative       Lymph # 4.2           Lymph % 25.1           MCH 28.9           MCHC 33.1           MCV 88           Mono # 1.2           Mono % 7.1           MPV 12.1           NITRITE UA     Negative       nRBC 0           Blood, UA     Negative       pH, UA     7.0       Platelet Count 234           Potassium 3.7           PROTEIN TOTAL 7.5           Protein, UA     Negative  Comment: Recommend a 24 hour urine protein or a urine   protein/creatinine ratio if globulin induced proteinuria is  clinically suspected.         RBC 4.32           RDW 13.8           Sodium 138           Spec Grav UA     1.010       Specimen Outdate 08/30/2024 23:59           Specimen UA     Urine, Clean Catch       UROBILINOGEN UA     Negative       WBC 16.76                   Physical Exam:   Constitutional: She is oriented to person, place, and time. She appears well-developed and well-nourished. No distress.       Cardiovascular:  Normal rate, regular rhythm and normal heart sounds.             Pulmonary/Chest: Effort normal and breath sounds normal.        Abdominal: Soft. Bowel sounds are normal. She exhibits no distension. There is no abdominal tenderness.             Musculoskeletal: Normal range of motion and moves all extremeties. No tenderness or edema.       Neurological: She is alert and oriented to person, place, and time.    Skin: Skin is warm and dry.    Psychiatric: She has a normal mood and affect. Her behavior is normal. Thought content normal.       Review of Systems

## 2024-08-28 NOTE — SUBJECTIVE & OBJECTIVE
Obstetric HPI:  This pregnancy has been complicated by   Uterine fibroids    OB History    Para Term  AB Living   1 0 0 0 0 0   SAB IAB Ectopic Multiple Live Births   0 0 0 0 0      # Outcome Date GA Lbr John/2nd Weight Sex Type Anes PTL Lv   1 Current              Past Medical History:   Diagnosis Date    Anxiety disorder, unspecified     Insomnia     Migraines      Past Surgical History:   Procedure Laterality Date    EYE SURGERY      PRK    wrist cyst removal Right        PTA Medications   Medication Sig    PNV,calcium 72-iron-folic acid (PRENATAL VITAMIN PLUS LOW IRON) 27 mg iron- 1 mg Tab Take 1 tablet (1 each total) by mouth once daily.       Review of patient's allergies indicates:  No Known Allergies     Family History       Problem Relation (Age of Onset)    Breast cancer Maternal Grandmother (66)    Cancer Maternal Grandfather    Leukemia Maternal Grandfather          Tobacco Use    Smoking status: Never     Passive exposure: Never    Smokeless tobacco: Never   Substance and Sexual Activity    Alcohol use: Not Currently     Comment: occ    Drug use: Never    Sexual activity: Yes     Partners: Male     Birth control/protection: OCP     Review of Systems   Gastrointestinal:  Positive for abdominal pain.   Genitourinary:  Negative for vaginal bleeding and vaginal discharge.      Objective:     Vital Signs (Most Recent):  Pulse: 92 (24)  BP: 125/80 (24)  SpO2: 100 % (24) Vital Signs (24h Range):  Pulse:  [92] 92  SpO2:  [100 %] 100 %  BP: (125)/(80) 125/80        There is no height or weight on file to calculate BMI.    FHT: 150 Cat 1 (reassuring)  TOCO:  Q 2-3 minutes upon arrival, spacing out since IV hydration started     Physical Exam:   Constitutional: She is oriented to person, place, and time. Vital signs are normal. She appears well-developed and well-nourished. She is cooperative.    HENT:   Head: Normocephalic.      Cardiovascular:  Normal rate.              Pulmonary/Chest: Effort normal.        Abdominal: Soft.   Gravid, non-tender     Genitourinary:    Vagina and uterus normal.      Genitourinary Comments: Small amount of thick white discharge.  Wet prep +clue cells and yeast hyphae  Cervix visualized C/T/H             Musculoskeletal: Normal range of motion and moves all extremeties.       Neurological: She is alert and oriented to person, place, and time. She has normal strength.    Skin: Skin is warm and dry. Capillary refill takes less than 2 seconds.    Psychiatric: She has a normal mood and affect. Her speech is normal and behavior is normal. Judgment and thought content normal.        Cervix:  C/T/H    FFN collected and held prior to exam     Significant Labs:  Lab Results   Component Value Date    GROUPTRH B POS 04/01/2024    HEPBSAG Non-reactive 04/01/2024       Recent Labs   Lab 08/27/24 2059   COLORU Colorless*   SPECGRAV 1.010   PHUR 7.0   PROTEINUA Negative   NITRITE Negative   LEUKOCYTESUR Negative   UROBILINOGEN Negative     I have personallly reviewed all pertinent lab results from the last 24 hours.

## 2024-08-28 NOTE — HPI
30yo  EGA 26w6d presents to LD with c/o contractions since yesterday. Reports lower abdominal cramping. Denies VB or LOF. Reports good fetal movement.

## 2024-08-28 NOTE — ASSESSMENT & PLAN NOTE
HD #2  Contractions have settled on Mg.  Awaiting BMZ # 2 tonight.  Overall stable.  Ok to proceed with regular diet.  Pt counseled on management plan and discharge goals.

## 2024-08-28 NOTE — HOSPITAL COURSE
8/27/24 2115  FHR reassuring, contractions q2-3 mins  Speculum exam performed: cervix C/T/H  Wet prep +clue cells and yeast hyphae  IV hydration, IV flagyl, diflucan  Continue to monitor closely    8/29/24: s/p BMZ and IV magnesium  Contractions resolved, and patient is doing well  Stable for discharge to home

## 2024-08-28 NOTE — PROGRESS NOTES
[facility] - Labor [facility] Delivery  Obstetrics  Antepartum Progress Note    Patient Name: Saima Ruiz  MRN: [id number]  Admission Date: 2024  Hospital Length of Stay: 0 days  Attending Physician: Joya Calderon MD  Primary Care Provider: David Simmons MD    Subjective:     Principal Problem: contractions    HPI:  28yo  EGA 26w6d presents to  with c/o contractions since yesterday. Reports lower abdominal cramping. Denies VB or LOF. Reports good fetal movement.     Hospital Course:  24  FHR reassuring, contractions q2-3 mins  Speculum exam performed: cervix C/T/H  Wet prep +clue cells and yeast hyphae  IV hydration, IV flagyl, diflucan  Continue to monitor closely    Obstetric HPI:  Patient reports None contractions, active fetal movement, absent vaginal bleeding , absent loss of fluid      Objective:     Vital Signs (Most Recent):  Temp: 99.1 °F (37.3 °C) (24 0703)  Pulse: 105 (24 0806)  Resp: 18 (24 0802)  BP: 127/75 (24 0802)  SpO2: 100 % (24 0806) Vital Signs (24h Range):  Temp:  [98.2 °F (36.8 °C)-99.1 °F (37.3 °C)] 99.1 °F (37.3 °C)  Pulse:  [] 105  Resp:  [18] 18  SpO2:  [95 %-100 %] 100 %  BP: (125-143)/(58-94) 127/75        There is no height or weight on file to calculate BMI.    FHT: Cat 1 (reassuring)  TOCO:  Rare contractions      Intake/Output Summary (Last 24 hours) at 2024 0938  Last data filed at 2024 0802  Gross per 24 hour   Intake --   Output 2150 ml   Net -2150 ml         Significant Labs:  Recent Lab Results         24  2351   24  2259   24  2059        Albumin 3.3                      ALT 20           Anion Gap 14           Appearance, UA     Clear       AST 19           Baso # 0.08           Basophil % 0.5           Bilirubin (UA)     Negative       BILIRUBIN TOTAL 0.3  Comment: For infants and newborns, interpretation of results should be based  on gestational age, weight and in agreement with  clinical  observations.    Premature Infant recommended reference ranges:  Up to 24 hours.............<8.0 mg/dL  Up to 48 hours............<12.0 mg/dL  3-5 days..................<15.0 mg/dL  6-29 days.................<15.0 mg/dL             BUN 7           Calcium 9.5           Chloride 106           CO2 18           Color, UA     Colorless       Creatinine 0.8           Differential Method Automated           eGFR >60           Eos # 0.1           Eos % 0.6           Fetal Fibronectin   Negative         Glucose 65           Glucose, UA     Negative       Gran # (ANC) 11.0           Gran % 65.3           Group & Rh B POS           Hematocrit 37.8           Hemoglobin 12.5           HIV 1/2 Ag/Ab Negative           Immature Grans (Abs) 0.23  Comment: Mild elevation in immature granulocytes is non specific and   can be seen in a variety of conditions including stress response,   acute inflammation, trauma and pregnancy. Correlation with other   laboratory and clinical findings is essential.             Immature Granulocytes 1.4           INDIRECT AKANKSHA NEG           Ketones, UA     Negative       Leukocyte Esterase, UA     Negative       Lymph # 4.2           Lymph % 25.1           MCH 28.9           MCHC 33.1           MCV 88           Mono # 1.2           Mono % 7.1           MPV 12.1           NITRITE UA     Negative       nRBC 0           Blood, UA     Negative       pH, UA     7.0       Platelet Count 234           Potassium 3.7           PROTEIN TOTAL 7.5           Protein, UA     Negative  Comment: Recommend a 24 hour urine protein or a urine   protein/creatinine ratio if globulin induced proteinuria is  clinically suspected.         RBC 4.32           RDW 13.8           Sodium 138           Spec Grav UA     1.010       Specimen Outdate 08/30/2024 23:59           Specimen UA     Urine, Clean Catch       UROBILINOGEN UA     Negative       WBC 16.76                   Physical Exam:   Constitutional: She is  oriented to person, place, and time. She appears well-developed and well-nourished. No distress.       Cardiovascular:  Normal rate, regular rhythm and normal heart sounds.             Pulmonary/Chest: Effort normal and breath sounds normal.        Abdominal: Soft. Bowel sounds are normal. She exhibits no distension. There is no abdominal tenderness.             Musculoskeletal: Normal range of motion and moves all extremeties. No tenderness or edema.       Neurological: She is alert and oriented to person, place, and time.    Skin: Skin is warm and dry.    Psychiatric: She has a normal mood and affect. Her behavior is normal. Thought content normal.       Review of Systems  Assessment/Plan:     29 y.o. female  at 27w0d for:    *  contractions  HD #2  Contractions have settled on Mg.  Awaiting BMZ # 2 tonight.  Overall stable.  Ok to proceed with regular diet.  Pt counseled on management plan and discharge goals.    BV (bacterial vaginosis)  IV yl          Yassine Lynch MD  Obstetrics  [facility] - [facility]

## 2024-08-28 NOTE — H&P
[facility]  Obstetrics  History & Physical    Patient Name: Saima Ruiz  MRN: [id number]  Admission Date: 2024  Primary Care Provider: David Simmons MD    Subjective:     Principal Problem: contractions    History of Present Illness:  30yo  EGA 26w6d presents to  with c/o contractions since yesterday. Reports lower abdominal cramping. Denies VB or LOF. Reports good fetal movement.     Obstetric HPI:  This pregnancy has been complicated by   Uterine fibroids    OB History    Para Term  AB Living   1 0 0 0 0 0   SAB IAB Ectopic Multiple Live Births   0 0 0 0 0      # Outcome Date GA Lbr John/2nd Weight Sex Type Anes PTL Lv   1 Current              Past Medical History:   Diagnosis Date    Anxiety disorder, unspecified     Insomnia     Migraines      Past Surgical History:   Procedure Laterality Date    EYE SURGERY      PRK    wrist cyst removal Right        PTA Medications   Medication Sig    PNV,calcium 72-iron-folic acid (PRENATAL VITAMIN PLUS LOW IRON) 27 mg iron- 1 mg Tab Take 1 tablet (1 each total) by mouth once daily.       Review of patient's allergies indicates:  No Known Allergies     Family History       Problem Relation (Age of Onset)    Breast cancer Maternal Grandmother (66)    Cancer Maternal Grandfather    Leukemia Maternal Grandfather          Tobacco Use    Smoking status: Never     Passive exposure: Never    Smokeless tobacco: Never   Substance and Sexual Activity    Alcohol use: Not Currently     Comment: occ    Drug use: Never    Sexual activity: Yes     Partners: Male     Birth control/protection: OCP     Review of Systems   Gastrointestinal:  Positive for abdominal pain.   Genitourinary:  Negative for vaginal bleeding and vaginal discharge.      Objective:     Vital Signs (Most Recent):  Pulse: 92 (24)  BP: 125/80 (24)  SpO2: 100 % (24) Vital Signs (24h Range):  Pulse:  [92] 92  SpO2:  [100 %] 100 %  BP:  (125)/(80) 125/80        There is no height or weight on file to calculate BMI.    FHT: 150 Cat 1 (reassuring)  TOCO:  Q 2-3 minutes upon arrival, spacing out since IV hydration started     Physical Exam:   Constitutional: She is oriented to person, place, and time. Vital signs are normal. She appears well-developed and well-nourished. She is cooperative.    HENT:   Head: Normocephalic.      Cardiovascular:  Normal rate.             Pulmonary/Chest: Effort normal.        Abdominal: Soft.   Gravid, non-tender     Genitourinary:    Vagina and uterus normal.      Genitourinary Comments: Small amount of thick white discharge.  Wet prep +clue cells and yeast hyphae  Cervix visualized C/T/H             Musculoskeletal: Normal range of motion and moves all extremeties.       Neurological: She is alert and oriented to person, place, and time. She has normal strength.    Skin: Skin is warm and dry. Capillary refill takes less than 2 seconds.    Psychiatric: She has a normal mood and affect. Her speech is normal and behavior is normal. Judgment and thought content normal.        Cervix:  C/T/H    FFN collected and held prior to exam     Significant Labs:  Lab Results   Component Value Date    GROUPTR B POS 2024    HEPBSAG Non-reactive 2024       Recent Labs   Lab 24   COLORU Colorless*   SPECGRAV 1.010   PHUR 7.0   PROTEINUA Negative   NITRITE Negative   LEUKOCYTESUR Negative   UROBILINOGEN Negative     I have personallly reviewed all pertinent lab results from the last 24 hours.  Assessment/Plan:     29 y.o. female  at 26w6d for:    *  contractions  24  FHR reassuring, contractions q2-3 mins  Speculum exam performed: cervix C/T/H  Wet prep +clue cells and yeast hyphae  IV hydration, IV flagyl, diflucan  Continue to monitor closely    BV (bacterial vaginosis)  IV flagyl        Lila Gonzalez CNM  Obstetrics  [facility]

## 2024-08-28 NOTE — NURSING
Pt requesting to ambulate to BR to attempt BM. Mother at bedside, pt assisted to BR and instructed to call out for help back to bed.

## 2024-08-29 VITALS
DIASTOLIC BLOOD PRESSURE: 74 MMHG | OXYGEN SATURATION: 99 % | RESPIRATION RATE: 18 BRPM | TEMPERATURE: 99 F | HEART RATE: 106 BPM | SYSTOLIC BLOOD PRESSURE: 123 MMHG

## 2024-08-29 LAB — GROUP B STREPTOCOCCUS, PCR: NEGATIVE

## 2024-08-29 PROCEDURE — G0378 HOSPITAL OBSERVATION PER HR: HCPCS

## 2024-08-29 PROCEDURE — 99238 HOSP IP/OBS DSCHRG MGMT 30/<: CPT | Mod: ,,, | Performed by: OBSTETRICS & GYNECOLOGY

## 2024-08-29 PROCEDURE — 63600175 PHARM REV CODE 636 W HCPCS: Performed by: OBSTETRICS & GYNECOLOGY

## 2024-08-29 PROCEDURE — 96372 THER/PROPH/DIAG INJ SC/IM: CPT | Performed by: OBSTETRICS & GYNECOLOGY

## 2024-08-29 RX ORDER — METRONIDAZOLE 500 MG/1
500 TABLET ORAL 2 TIMES DAILY
Qty: 14 TABLET | Refills: 0 | Status: SHIPPED | OUTPATIENT
Start: 2024-08-29 | End: 2024-09-05

## 2024-08-29 RX ADMIN — BETAMETHASONE SODIUM PHOSPHATE AND BETAMETHASONE ACETATE 12 MG: 3; 3 INJECTION, SUSPENSION INTRA-ARTICULAR; INTRALESIONAL; INTRAMUSCULAR at 12:08

## 2024-08-29 NOTE — DISCHARGE INSTRUCTIONS

## 2024-08-29 NOTE — PROGRESS NOTES
[facility]  Obstetrics  Antepartum Progress Note    Patient Name: Saima Ruiz  MRN: [id number]  Admission Date: 2024  Hospital Length of Stay: 1 days  Attending Physician: Joya Calderon MD  Primary Care Provider: David Simmons MD    Subjective:     Principal Problem: contractions    HPI:  30yo  EGA 26w6d presents to  with c/o contractions since yesterday. Reports lower abdominal cramping. Denies VB or LOF. Reports good fetal movement.     Hospital Course:  24  FHR reassuring, contractions q2-3 mins  Speculum exam performed: cervix C/T/H  Wet prep +clue cells and yeast hyphae  IV hydration, IV flagyl, diflucan  Continue to monitor closely    24: s/p BMZ and IV magnesium  Contractions resolved, and patient is doing well  Stable for discharge to home    Obstetric HPI:  Patient reports None contractions, active fetal movement, absent vaginal bleeding , absent loss of fluid   Doing well this morning.  No ctxns.  Feels ready to go home.     Objective:     Vital Signs (Most Recent):  Temp: 99.1 °F (37.3 °C) (24 0703)  Pulse: 103 (24 0302)  Resp: 18 (24 0302)  BP: (!) 106/49 (24 0302)  SpO2: 99 % (24 0306) Vital Signs (24h Range):  Pulse:  [] 103  Resp:  [18] 18  SpO2:  [97 %-100 %] 99 %  BP: (106-135)/(49-82) 106/49        There is no height or weight on file to calculate BMI.    FHT: Cat 1 (reassuring)  TOCO:  Q 0 minutes      Intake/Output Summary (Last 24 hours) at 2024 0724  Last data filed at 2024 0400  Gross per 24 hour   Intake 2123.75 ml   Output 4115 ml   Net -1991.25 ml            Significant Labs:  Recent Lab Results       None            Physical Exam:   Constitutional: She is oriented to person, place, and time. She appears well-developed and well-nourished. No distress.    HENT:   Head: Normocephalic and atraumatic.       Pulmonary/Chest: Effort normal.        Abdominal: Soft. She exhibits no distension  and no mass. There is no abdominal tenderness. There is no rebound and no guarding.   Uterus gravid, soft, and non-tender             Musculoskeletal: No edema.       Neurological: She is alert and oriented to person, place, and time.    Skin: No rash noted.    Psychiatric: She has a normal mood and affect. Her behavior is normal. Judgment and thought content normal.       Review of Systems  Assessment/Plan:     29 y.o. female  at 27w1d for:    *  contractions  HD #3  Contractions resolved.  Is s/p IV magnesium sulfate and BMZ series  Stable for discharge to home.  Labor precautions reviewed.  Advised to avoid any vigorous exercise, and advised pelvic rest for now.    BV (bacterial vaginosis)  Flagyl x 7 days          Katherine Arnold MD  Obstetrics  [facility]

## 2024-08-29 NOTE — DISCHARGE SUMMARY
[facility]  Obstetrics  Discharge Summary      Patient Name: Saima Ruiz  MRN: [id number]  Admission Date: 2024  Hospital Length of Stay: 1 days  Discharge Date and Time:  2024 7:29 AM  Attending Physician: Joya Calderon MD   Discharging Provider: Katherine Arnold MD   Primary Care Provider: David Simmons MD    HPI: 30yo  EGA 26w6d presents to  with c/o contractions since yesterday. Reports lower abdominal cramping. Denies VB or LOF. Reports good fetal movement.     FHT: Cat 1 (reassuring)  TOCO:  Q 0 minutes    * No surgery found *     Hospital Course:   24  FHR reassuring, contractions q2-3 mins  Speculum exam performed: cervix C/T/H  Wet prep +clue cells and yeast hyphae  IV hydration, IV flagyl, diflucan  Continue to monitor closely    24: s/p BMZ and IV magnesium  Contractions resolved, and patient is doing well  Stable for discharge to home         Final Active Diagnoses:    Diagnosis Date Noted POA    PRINCIPAL PROBLEM:   contractions [O47.00] 2024 Yes    BV (bacterial vaginosis) [N76.0, B96.89] 2024 Yes      Problems Resolved During this Admission:        Significant Diagnostic Studies: Labs: CMP   Recent Labs   Lab 24  2351      K 3.7      CO2 18*   GLU 65*   BUN 7   CREATININE 0.8   CALCIUM 9.5   PROT 7.5   ALBUMIN 3.3*   BILITOT 0.3   ALKPHOS 131   AST 19   ALT 20   ANIONGAP 14         Feeding Method: N/A    Immunizations       None            This patient has no babies on file.  Pending Diagnostic Studies:       None            Discharged Condition: good    Disposition: Home or Self Care    Follow Up:   Follow-up Information       Priya Bernardo CNM Follow up on 2024.    Specialty: Obstetrics and Gynecology  Why: routine OB visit and labs  Contact information:  [address]  [place] [address] [phone]                           Patient Instructions:      Diet Adult Regular     No dressing  needed     Pelvic Rest     Notify your health care provider if you experience any of the following:  temperature >100.4     Notify your health care provider if you experience any of the following:  persistent nausea and vomiting or diarrhea     Notify your health care provider if you experience any of the following:  severe uncontrolled pain     Notify your health care provider if you experience any of the following:  redness, tenderness, or signs of infection (pain, swelling, redness, odor or green/yellow discharge around incision site)     Notify your health care provider if you experience any of the following:  difficulty breathing or increased cough     Notify your health care provider if you experience any of the following:  severe persistent headache     Notify your health care provider if you experience any of the following:  worsening rash     Notify your health care provider if you experience any of the following:  persistent dizziness, light-headedness, or visual disturbances     Notify your health care provider if you experience any of the following:  increased confusion or weakness     Medications:  Current Discharge Medication List        START taking these medications    Details   metroNIDAZOLE (FLAGYL) 500 MG tablet Take 1 tablet (500 mg total) by mouth 2 (two) times daily. for 7 days  Qty: 14 tablet, Refills: 0           CONTINUE these medications which have NOT CHANGED    Details   PNV,calcium 72-iron-folic acid (PRENATAL VITAMIN PLUS LOW IRON) 27 mg iron- 1 mg Tab Take 1 tablet (1 each total) by mouth once daily.  Qty: 30 tablet, Refills: 11             Katherine Arnold MD  Obstetrics  [facility]

## 2024-08-29 NOTE — SUBJECTIVE & OBJECTIVE
Obstetric HPI:  Patient reports None contractions, active fetal movement, absent vaginal bleeding , absent loss of fluid   Doing well this morning.  No ctxns.  Feels ready to go home.     Objective:     Vital Signs (Most Recent):  Temp: 99.1 °F (37.3 °C) (08/28/24 0703)  Pulse: 103 (08/29/24 0302)  Resp: 18 (08/29/24 0302)  BP: (!) 106/49 (08/29/24 0302)  SpO2: 99 % (08/29/24 0306) Vital Signs (24h Range):  Pulse:  [] 103  Resp:  [18] 18  SpO2:  [97 %-100 %] 99 %  BP: (106-135)/(49-82) 106/49        There is no height or weight on file to calculate BMI.    FHT: Cat 1 (reassuring)  TOCO:  Q 0 minutes      Intake/Output Summary (Last 24 hours) at 8/29/2024 0724  Last data filed at 8/29/2024 0400  Gross per 24 hour   Intake 2123.75 ml   Output 4115 ml   Net -1991.25 ml            Significant Labs:  Recent Lab Results       None            Physical Exam:   Constitutional: She is oriented to person, place, and time. She appears well-developed and well-nourished. No distress.    HENT:   Head: Normocephalic and atraumatic.       Pulmonary/Chest: Effort normal.        Abdominal: Soft. She exhibits no distension and no mass. There is no abdominal tenderness. There is no rebound and no guarding.   Uterus gravid, soft, and non-tender             Musculoskeletal: No edema.       Neurological: She is alert and oriented to person, place, and time.    Skin: No rash noted.    Psychiatric: She has a normal mood and affect. Her behavior is normal. Judgment and thought content normal.       Review of Systems

## 2024-08-29 NOTE — ASSESSMENT & PLAN NOTE
HD #3  Contractions resolved.  Is s/p IV magnesium sulfate and BMZ series  Stable for discharge to home.  Labor precautions reviewed.  Advised to avoid any vigorous exercise, and advised pelvic rest for now.

## 2024-08-30 ENCOUNTER — HOSPITAL ENCOUNTER (OUTPATIENT)
Facility: HOSPITAL | Age: 29
Discharge: HOME | End: 2024-08-30
Attending: OBSTETRICS & GYNECOLOGY | Admitting: OBSTETRICS & GYNECOLOGY
Payer: COMMERCIAL

## 2024-08-30 VITALS
HEART RATE: 94 BPM | DIASTOLIC BLOOD PRESSURE: 84 MMHG | RESPIRATION RATE: 20 BRPM | TEMPERATURE: 99 F | HEIGHT: 60 IN | BODY MASS INDEX: 33.77 KG/M2 | OXYGEN SATURATION: 98 % | SYSTOLIC BLOOD PRESSURE: 121 MMHG | WEIGHT: 172 LBS

## 2024-08-30 DIAGNOSIS — O26.892 VAGINAL DISCHARGE DURING PREGNANCY IN SECOND TRIMESTER: ICD-10-CM

## 2024-08-30 DIAGNOSIS — N89.8 VAGINAL DISCHARGE DURING PREGNANCY IN SECOND TRIMESTER: ICD-10-CM

## 2024-08-30 PROCEDURE — 99211 OFF/OP EST MAY X REQ PHY/QHP: CPT

## 2024-08-30 PROCEDURE — 99213 OFFICE O/P EST LOW 20 MIN: CPT | Mod: ,,, | Performed by: MIDWIFE

## 2024-08-30 NOTE — SUBJECTIVE & OBJECTIVE
Obstetric HPI:  Patient reports None contractions, active fetal movement, No vaginal bleeding , ? loss of fluid     This pregnancy has been complicated by BV,  contractions, anxiety and depression, fibroids.    OB History    Para Term  AB Living   1 0 0 0 0 0   SAB IAB Ectopic Multiple Live Births   0 0 0 0 0      # Outcome Date GA Lbr John/2nd Weight Sex Type Anes PTL Lv   1 Current              Past Medical History:   Diagnosis Date    Anxiety disorder, unspecified     Insomnia     Migraines      Past Surgical History:   Procedure Laterality Date    EYE SURGERY      PRK    wrist cyst removal Right        PTA Medications   Medication Sig    metroNIDAZOLE (FLAGYL) 500 MG tablet Take 1 tablet (500 mg total) by mouth 2 (two) times daily. for 7 days    PNV,calcium 72-iron-folic acid (PRENATAL VITAMIN PLUS LOW IRON) 27 mg iron- 1 mg Tab Take 1 tablet (1 each total) by mouth once daily.       Review of patient's allergies indicates:  No Known Allergies     Family History       Problem Relation (Age of Onset)    Breast cancer Maternal Grandmother (66)    Cancer Maternal Grandfather    Leukemia Maternal Grandfather          Tobacco Use    Smoking status: Never     Passive exposure: Never    Smokeless tobacco: Never   Substance and Sexual Activity    Alcohol use: Not Currently     Comment: occ    Drug use: Never    Sexual activity: Yes     Partners: Male     Birth control/protection: OCP     Review of Systems   Gastrointestinal:  Negative for abdominal pain.   Genitourinary:  Positive for vaginal discharge (1x gush of fluid). Negative for dysuria, urgency and vaginal bleeding.   All other systems reviewed and are negative.     Objective:     Vital Signs (Most Recent):  Temp: 99 °F (37.2 °C) (24 1750)  Pulse: 91 (24 1754)  Resp: 20 (24 175)  BP: 128/72 (24 1754)  SpO2: 98 % (24 175) Vital Signs (24h Range):  Temp:  [99 °F (37.2 °C)] 99 °F (37.2 °C)  Pulse:  [91] 91  Resp:   [20] 20  SpO2:  [98 %] 98 %  BP: (128)/(72) 128/72     Weight: 78 kg (172 lb)  Body mass index is 33.59 kg/m².    FHT: Cat 1 (reassuring)  TOCO:  0     Physical Exam:   Constitutional: She is oriented to person, place, and time. She appears well-developed and well-nourished.    HENT:   Head: Normocephalic and atraumatic.    Eyes: Conjunctivae and EOM are normal.     Cardiovascular:  Normal rate, regular rhythm and normal heart sounds.             Pulmonary/Chest: Effort normal and breath sounds normal. No respiratory distress.        Abdominal: Soft. Bowel sounds are normal. She exhibits no distension. There is no abdominal tenderness.     Genitourinary:    Vagina and uterus normal.   No vaginal discharge in the vagina.           Musculoskeletal: Normal range of motion and moves all extremeties.       Neurological: She is alert and oriented to person, place, and time.    Skin: Skin is warm and dry.    Psychiatric: She has a normal mood and affect. Her behavior is normal. Judgment and thought content normal.        Cervix:  Dilation:  Visually closed/thick. Scant amount of normal leukorrhea in vagina.      Significant Labs:  Lab Results   Component Value Date    [facility] 08/27/2024    HEPBSAG Non-reactive 04/01/2024

## 2024-08-30 NOTE — DISCHARGE INSTRUCTIONS

## 2024-08-30 NOTE — H&P
[facility]  Obstetrics  History & Physical    Patient Name: Saima Ruiz  MRN: [id number]  Admission Date: 2024  Primary Care Provider: David Simmons MD    Subjective:     Principal Problem:Vaginal discharge during pregnancy in second trimester    History of Present Illness:   27.2 c/o 1X gush of fluid. States put a panty liner on and did not continue to leak. Reports good fetal movement. No contractions or VB. Currently on Flagyl for BV.    Obstetric HPI:  Patient reports None contractions, active fetal movement, No vaginal bleeding , ? loss of fluid     This pregnancy has been complicated by BV,  contractions, anxiety and depression, fibroids.    OB History    Para Term  AB Living   1 0 0 0 0 0   SAB IAB Ectopic Multiple Live Births   0 0 0 0 0      # Outcome Date GA Lbr John/2nd Weight Sex Type Anes PTL Lv   1 Current              Past Medical History:   Diagnosis Date    Anxiety disorder, unspecified     Insomnia     Migraines      Past Surgical History:   Procedure Laterality Date    EYE SURGERY      PRK    wrist cyst removal Right        PTA Medications   Medication Sig    metroNIDAZOLE (FLAGYL) 500 MG tablet Take 1 tablet (500 mg total) by mouth 2 (two) times daily. for 7 days    PNV,calcium 72-iron-folic acid (PRENATAL VITAMIN PLUS LOW IRON) 27 mg iron- 1 mg Tab Take 1 tablet (1 each total) by mouth once daily.       Review of patient's allergies indicates:  No Known Allergies     Family History       Problem Relation (Age of Onset)    Breast cancer Maternal Grandmother (66)    Cancer Maternal Grandfather    Leukemia Maternal Grandfather          Tobacco Use    Smoking status: Never     Passive exposure: Never    Smokeless tobacco: Never   Substance and Sexual Activity    Alcohol use: Not Currently     Comment: occ    Drug use: Never    Sexual activity: Yes     Partners: Male     Birth control/protection: OCP     Review of Systems   Gastrointestinal:   Negative for abdominal pain.   Genitourinary:  Positive for vaginal discharge (1x gush of fluid). Negative for dysuria, urgency and vaginal bleeding.   All other systems reviewed and are negative.     Objective:     Vital Signs (Most Recent):  Temp: 99 °F (37.2 °C) (24)  Pulse: 91 (24)  Resp: 20 (24)  BP: 128/72 (24)  SpO2: 98 % (24) Vital Signs (24h Range):  Temp:  [99 °F (37.2 °C)] 99 °F (37.2 °C)  Pulse:  [91] 91  Resp:  [20] 20  SpO2:  [98 %] 98 %  BP: (128)/(72) 128/72     Weight: 78 kg (172 lb)  Body mass index is 33.59 kg/m².    FHT: Cat 1 (reassuring)  TOCO:  0     Physical Exam:   Constitutional: She is oriented to person, place, and time. She appears well-developed and well-nourished.    HENT:   Head: Normocephalic and atraumatic.    Eyes: Conjunctivae and EOM are normal.     Cardiovascular:  Normal rate, regular rhythm and normal heart sounds.             Pulmonary/Chest: Effort normal and breath sounds normal. No respiratory distress.        Abdominal: Soft. Bowel sounds are normal. She exhibits no distension. There is no abdominal tenderness.     Genitourinary:    Vagina and uterus normal.   No vaginal discharge in the vagina.           Musculoskeletal: Normal range of motion and moves all extremeties.       Neurological: She is alert and oriented to person, place, and time.    Skin: Skin is warm and dry.    Psychiatric: She has a normal mood and affect. Her behavior is normal. Judgment and thought content normal.        Cervix:  Dilation:  Visually closed/thick. Scant amount of normal leukorrhea in vagina.      Significant Labs:  Lab Results   Component Value Date    GROUPTRH B POS 2024    HEPBSAG Non-reactive 2024     Assessment/Plan:     29 y.o. female  at 27w2d for:    * Vaginal discharge during pregnancy in second trimester  OB triage  Speculum exam: cervix is L/T/C. No pooling, nitrazine negative, fern negative, wet prep  negative.   D/c to home with precautions        Jenny Paiz CNM  Obstetrics  [facility]

## 2024-08-30 NOTE — HOSPITAL COURSE
OB triage  Speculum exam: cervix is L/T/C. No pooling, nitrazine negative, fern negative, wet prep negative.   D/c to home with precautions

## 2024-08-30 NOTE — HPI
27.2 c/o 1X gush of fluid. States put a panty liner on and did not continue to leak. Reports good fetal movement. No contractions or VB. Currently on Flagyl for BV.

## 2024-09-04 ENCOUNTER — PATIENT MESSAGE (OUTPATIENT)
Dept: OBSTETRICS AND GYNECOLOGY | Facility: CLINIC | Age: 29
End: 2024-09-04

## 2024-09-04 ENCOUNTER — ROUTINE PRENATAL (OUTPATIENT)
Dept: OBSTETRICS AND GYNECOLOGY | Facility: CLINIC | Age: 29
End: 2024-09-04
Payer: COMMERCIAL

## 2024-09-04 ENCOUNTER — PATIENT MESSAGE (OUTPATIENT)
Dept: OTHER | Facility: OTHER | Age: 29
End: 2024-09-04
Payer: COMMERCIAL

## 2024-09-04 ENCOUNTER — LAB VISIT (OUTPATIENT)
Dept: LAB | Facility: HOSPITAL | Age: 29
End: 2024-09-04
Payer: COMMERCIAL

## 2024-09-04 VITALS — BODY MASS INDEX: 33.8 KG/M2 | DIASTOLIC BLOOD PRESSURE: 64 MMHG | WEIGHT: 173.06 LBS | SYSTOLIC BLOOD PRESSURE: 114 MMHG

## 2024-09-04 DIAGNOSIS — Z3A.24 24 WEEKS GESTATION OF PREGNANCY: ICD-10-CM

## 2024-09-04 DIAGNOSIS — Z3A.28 28 WEEKS GESTATION OF PREGNANCY: Primary | ICD-10-CM

## 2024-09-04 DIAGNOSIS — Z23 ENCOUNTER FOR VACCINATION: ICD-10-CM

## 2024-09-04 DIAGNOSIS — D25.9 LEIOMYOMA OF UTERUS AFFECTING PREGNANCY IN THIRD TRIMESTER: ICD-10-CM

## 2024-09-04 DIAGNOSIS — F43.23 SITUATIONAL MIXED ANXIETY AND DEPRESSIVE DISORDER: ICD-10-CM

## 2024-09-04 DIAGNOSIS — O34.13 LEIOMYOMA OF UTERUS AFFECTING PREGNANCY IN THIRD TRIMESTER: ICD-10-CM

## 2024-09-04 PROBLEM — B96.89 BV (BACTERIAL VAGINOSIS): Status: RESOLVED | Noted: 2024-08-27 | Resolved: 2024-09-04

## 2024-09-04 PROBLEM — N76.0 BV (BACTERIAL VAGINOSIS): Status: RESOLVED | Noted: 2024-08-27 | Resolved: 2024-09-04

## 2024-09-04 PROBLEM — N89.8 VAGINAL DISCHARGE DURING PREGNANCY IN SECOND TRIMESTER: Status: RESOLVED | Noted: 2024-08-30 | Resolved: 2024-09-04

## 2024-09-04 PROBLEM — O26.892 VAGINAL DISCHARGE DURING PREGNANCY IN SECOND TRIMESTER: Status: RESOLVED | Noted: 2024-08-30 | Resolved: 2024-09-04

## 2024-09-04 LAB
BASOPHILS # BLD AUTO: 0.06 K/UL (ref 0–0.2)
BASOPHILS NFR BLD: 0.4 % (ref 0–1.9)
DIFFERENTIAL METHOD BLD: ABNORMAL
EOSINOPHIL # BLD AUTO: 0.1 K/UL (ref 0–0.5)
EOSINOPHIL NFR BLD: 0.5 % (ref 0–8)
ERYTHROCYTE [DISTWIDTH] IN BLOOD BY AUTOMATED COUNT: 14.2 % (ref 11.5–14.5)
GLUCOSE SERPL-MCNC: 94 MG/DL (ref 70–140)
HCT VFR BLD AUTO: 39.1 % (ref 37–48.5)
HGB BLD-MCNC: 11.9 G/DL (ref 12–16)
HIV 1+2 AB+HIV1 P24 AG SERPL QL IA: NORMAL
IMM GRANULOCYTES # BLD AUTO: 0.5 K/UL (ref 0–0.04)
IMM GRANULOCYTES NFR BLD AUTO: 3.3 % (ref 0–0.5)
LYMPHOCYTES # BLD AUTO: 3.2 K/UL (ref 1–4.8)
LYMPHOCYTES NFR BLD: 21 % (ref 18–48)
MCH RBC QN AUTO: 27.7 PG (ref 27–31)
MCHC RBC AUTO-ENTMCNC: 30.4 G/DL (ref 32–36)
MCV RBC AUTO: 91 FL (ref 82–98)
MONOCYTES # BLD AUTO: 0.8 K/UL (ref 0.3–1)
MONOCYTES NFR BLD: 5.1 % (ref 4–15)
NEUTROPHILS # BLD AUTO: 10.5 K/UL (ref 1.8–7.7)
NEUTROPHILS NFR BLD: 69.7 % (ref 38–73)
NRBC BLD-RTO: 0 /100 WBC
PLATELET # BLD AUTO: 235 K/UL (ref 150–450)
PMV BLD AUTO: 11.4 FL (ref 9.2–12.9)
RBC # BLD AUTO: 4.3 M/UL (ref 4–5.4)
TREPONEMA PALLIDUM IGG+IGM AB [PRESENCE] IN SERUM OR PLASMA BY IMMUNOASSAY: NONREACTIVE
WBC # BLD AUTO: 15.08 K/UL (ref 3.9–12.7)

## 2024-09-04 PROCEDURE — 99999 PR PBB SHADOW E&M-EST. PATIENT-LVL II: CPT | Mod: PBBFAC,,,

## 2024-09-04 PROCEDURE — 87389 HIV-1 AG W/HIV-1&-2 AB AG IA: CPT

## 2024-09-04 PROCEDURE — 36415 COLL VENOUS BLD VENIPUNCTURE: CPT

## 2024-09-04 PROCEDURE — 85025 COMPLETE CBC W/AUTO DIFF WBC: CPT

## 2024-09-04 PROCEDURE — 86593 SYPHILIS TEST NON-TREP QUANT: CPT

## 2024-09-04 PROCEDURE — 82950 GLUCOSE TEST: CPT

## 2024-09-04 NOTE — PROGRESS NOTES
29 y.o. female  at 28w0d   Reports + FM, denies VB, LOF or CTX  Doing well without concerns. Was recently admitted for PTL.  Received BMZ series and Nmag.  Reviewed warning signs and when to return to LD. Patient verbalized understanding   Patient reports having difficulty sleeping. Is taking unisom blue label but reports that it makes her groggy. Discussed taking orange label or magnesium supplement for sleep assistance. Patient verbalized understanding and will try this.   TW lbs   28wk labs today (B POS)  Tdap today  LD card given and reviewed    28 weeks gestation of pregnancy  - routine PNC   Leiomyoma of uterus affecting pregnancy in third trimester  - growth at 32w per MFM  Situational mixed anxiety and depressive disorder  - doing well  Encounter for vaccination  -     Tdap (BOOSTRIX) vaccine injection 0.5 mL  -     Discontinue: influenza (Flulaval, Fluzone, Fluarix) 45 mcg/0.5 mL vaccine 0.5 mL  -      decided to wait until [place] for flu vaccine        Reviewed warning signs, normal FKCs,  labor precautions and how/when to call. Patient states understanding  RTC x 2 wks, call or present sooner prn.

## 2024-09-04 NOTE — NURSING
Patient is here for encounter for tdap vaccine     Verified patient with 2 patient identifiers. Allergies and medications reviewed.   Tdap given IM to Left deltoid using aseptic technique.   No discomfort noted. Patient tolerated well.     Next appointment scheduled.     Patient advised to wait 15 minutes in lobby to monitor for reaction.   Patient verbalized understanding.

## 2024-09-18 ENCOUNTER — ROUTINE PRENATAL (OUTPATIENT)
Dept: OBSTETRICS AND GYNECOLOGY | Facility: CLINIC | Age: 29
End: 2024-09-18
Payer: COMMERCIAL

## 2024-09-18 ENCOUNTER — PATIENT MESSAGE (OUTPATIENT)
Dept: OTHER | Facility: OTHER | Age: 29
End: 2024-09-18
Payer: COMMERCIAL

## 2024-09-18 VITALS — SYSTOLIC BLOOD PRESSURE: 106 MMHG | BODY MASS INDEX: 34.57 KG/M2 | DIASTOLIC BLOOD PRESSURE: 70 MMHG | WEIGHT: 177 LBS

## 2024-09-18 DIAGNOSIS — D25.9 LEIOMYOMA OF UTERUS AFFECTING PREGNANCY IN THIRD TRIMESTER: ICD-10-CM

## 2024-09-18 DIAGNOSIS — O34.13 LEIOMYOMA OF UTERUS AFFECTING PREGNANCY IN THIRD TRIMESTER: ICD-10-CM

## 2024-09-18 DIAGNOSIS — Z3A.30 30 WEEKS GESTATION OF PREGNANCY: Primary | ICD-10-CM

## 2024-09-18 PROCEDURE — 99999 PR PBB SHADOW E&M-EST. PATIENT-LVL II: CPT | Mod: PBBFAC,,,

## 2024-09-20 NOTE — PROGRESS NOTES
29 y.o. female  at 30w0d   Reports + FM, denies VB, LOF or CTX  Doing well without concerns   TW lbs   Reviewed 28wk lab results (B POS) H/H: 11.9/39.1, RPR negative, HIV negative   Glucose screen Normal   Tdap 24    30 weeks gestation of pregnancy  - routine PNC   - desires flu. None available in clinic  Leiomyoma of uterus affecting pregnancy in third trimester  -     US OB/GYN Procedure ([facility])-Future; Future  -     growth at 32w         Reviewed warning signs, normal FKCs,  labor precautions and how/when to call. Patient states understanding  RTC x 2 wks, call or present sooner prn

## 2024-09-23 ENCOUNTER — PATIENT MESSAGE (OUTPATIENT)
Dept: OBSTETRICS AND GYNECOLOGY | Facility: CLINIC | Age: 29
End: 2024-09-23
Payer: COMMERCIAL

## 2024-10-02 ENCOUNTER — PATIENT MESSAGE (OUTPATIENT)
Dept: OTHER | Facility: OTHER | Age: 29
End: 2024-10-02
Payer: COMMERCIAL

## 2024-10-03 ENCOUNTER — ROUTINE PRENATAL (OUTPATIENT)
Dept: OBSTETRICS AND GYNECOLOGY | Facility: CLINIC | Age: 29
End: 2024-10-03
Payer: COMMERCIAL

## 2024-10-03 ENCOUNTER — PROCEDURE VISIT (OUTPATIENT)
Dept: OBSTETRICS AND GYNECOLOGY | Facility: CLINIC | Age: 29
End: 2024-10-03
Payer: COMMERCIAL

## 2024-10-03 VITALS
WEIGHT: 179.88 LBS | BODY MASS INDEX: 35.13 KG/M2 | SYSTOLIC BLOOD PRESSURE: 116 MMHG | DIASTOLIC BLOOD PRESSURE: 62 MMHG

## 2024-10-03 DIAGNOSIS — Z23 ENCOUNTER FOR VACCINATION: ICD-10-CM

## 2024-10-03 DIAGNOSIS — O34.13 LEIOMYOMA OF UTERUS AFFECTING PREGNANCY IN THIRD TRIMESTER: ICD-10-CM

## 2024-10-03 DIAGNOSIS — D25.9 LEIOMYOMA OF UTERUS AFFECTING PREGNANCY IN THIRD TRIMESTER: ICD-10-CM

## 2024-10-03 DIAGNOSIS — Z3A.32 32 WEEKS GESTATION OF PREGNANCY: Primary | ICD-10-CM

## 2024-10-03 PROCEDURE — 76816 OB US FOLLOW-UP PER FETUS: CPT | Mod: S$GLB,,, | Performed by: OBSTETRICS & GYNECOLOGY

## 2024-10-03 PROCEDURE — 99999 PR PBB SHADOW E&M-EST. PATIENT-LVL II: CPT | Mod: PBBFAC,,,

## 2024-10-03 NOTE — PROGRESS NOTES
29 y.o. female  at 32w1d   Reports + FM, denies VB, LOF or CTX  Doing well without concerns   US reviewed: , vertex, right lateral placenta, 3VC, NEWTON wNL, MVP 5.3cm, EFW 1751g (3lb 14oz), 19%ile, AC 19%  TWG: 15 lbs   Tdap 24  Flu today       32 weeks gestation of pregnancy  - routine PNC   Encounter for vaccination  -     influenza (Flulaval, Fluzone, Fluarix) 45 mcg/0.5 mL IM vaccine (> or = 6 mo) 0.5 mL    Leiomyoma of uterus affecting pregnancy in third trimester  46 x 55 x 57mm  21 x 33 x 24mm  15 x 37 x 36mm      Reviewed warning signs, normal FKCs,  labor precautions and how/when to call. Patient states understanding  RTC x 2 wks, call or present sooner prn

## 2024-10-18 ENCOUNTER — ROUTINE PRENATAL (OUTPATIENT)
Dept: OBSTETRICS AND GYNECOLOGY | Facility: CLINIC | Age: 29
End: 2024-10-18
Payer: COMMERCIAL

## 2024-10-18 VITALS
DIASTOLIC BLOOD PRESSURE: 80 MMHG | WEIGHT: 183.44 LBS | BODY MASS INDEX: 35.82 KG/M2 | SYSTOLIC BLOOD PRESSURE: 118 MMHG

## 2024-10-18 DIAGNOSIS — Z3A.34 34 WEEKS GESTATION OF PREGNANCY: Primary | ICD-10-CM

## 2024-10-18 DIAGNOSIS — Z36.89 ENCOUNTER FOR ULTRASOUND TO ASSESS FETAL GROWTH: ICD-10-CM

## 2024-10-18 DIAGNOSIS — O34.13 LEIOMYOMA OF UTERUS AFFECTING PREGNANCY IN THIRD TRIMESTER: ICD-10-CM

## 2024-10-18 DIAGNOSIS — D25.9 LEIOMYOMA OF UTERUS AFFECTING PREGNANCY IN THIRD TRIMESTER: ICD-10-CM

## 2024-10-18 DIAGNOSIS — F43.23 SITUATIONAL MIXED ANXIETY AND DEPRESSIVE DISORDER: ICD-10-CM

## 2024-10-18 PROCEDURE — 99999 PR PBB SHADOW E&M-EST. PATIENT-LVL II: CPT | Mod: PBBFAC,,,

## 2024-10-18 NOTE — PROGRESS NOTES
29 y.o. female  at 34w2d   Reports + FM, denies VB, LOF or CTX  Doing well without concerns   Desires RSV vaccine at NV  TW lbs   Tdap 24  Flu 10/3/24  GBS handout given and reviewed    34 weeks gestation of pregnancy  - routine PNC   Leiomyoma of uterus affecting pregnancy in third trimester  -     US OB/GYN Procedure ([facility])-Future; Future    Situational mixed anxiety and depressive disorder  - doing well  Encounter for ultrasound to assess fetal growth  -     US OB/GYN Procedure ([facility])-Future; Future        Reviewed warning signs, normal FKCs,  labor precautions and how/when to call. Patient states understanding  RTC x 2 wks, call or present sooner prn

## 2024-10-23 ENCOUNTER — PATIENT MESSAGE (OUTPATIENT)
Dept: OTHER | Facility: OTHER | Age: 29
End: 2024-10-23
Payer: COMMERCIAL

## 2024-10-27 ENCOUNTER — PATIENT MESSAGE (OUTPATIENT)
Dept: OBSTETRICS AND GYNECOLOGY | Facility: CLINIC | Age: 29
End: 2024-10-27
Payer: COMMERCIAL

## 2024-11-01 ENCOUNTER — PROCEDURE VISIT (OUTPATIENT)
Dept: OBSTETRICS AND GYNECOLOGY | Facility: CLINIC | Age: 29
End: 2024-11-01
Payer: COMMERCIAL

## 2024-11-01 ENCOUNTER — ROUTINE PRENATAL (OUTPATIENT)
Dept: OBSTETRICS AND GYNECOLOGY | Facility: CLINIC | Age: 29
End: 2024-11-01
Payer: COMMERCIAL

## 2024-11-01 VITALS
WEIGHT: 186.31 LBS | BODY MASS INDEX: 36.38 KG/M2 | DIASTOLIC BLOOD PRESSURE: 70 MMHG | SYSTOLIC BLOOD PRESSURE: 116 MMHG

## 2024-11-01 DIAGNOSIS — Z3A.36 36 WEEKS GESTATION OF PREGNANCY: Primary | ICD-10-CM

## 2024-11-01 DIAGNOSIS — O34.13 LEIOMYOMA OF UTERUS AFFECTING PREGNANCY IN THIRD TRIMESTER: ICD-10-CM

## 2024-11-01 DIAGNOSIS — Z36.89 ENCOUNTER FOR ULTRASOUND TO ASSESS FETAL GROWTH: ICD-10-CM

## 2024-11-01 DIAGNOSIS — D25.9 LEIOMYOMA OF UTERUS AFFECTING PREGNANCY IN THIRD TRIMESTER: ICD-10-CM

## 2024-11-01 PROCEDURE — 99999 PR PBB SHADOW E&M-EST. PATIENT-LVL II: CPT | Mod: PBBFAC,,,

## 2024-11-01 PROCEDURE — 87653 STREP B DNA AMP PROBE: CPT

## 2024-11-01 PROCEDURE — 76816 OB US FOLLOW-UP PER FETUS: CPT | Mod: S$GLB,,, | Performed by: OBSTETRICS & GYNECOLOGY

## 2024-11-03 ENCOUNTER — HOSPITAL ENCOUNTER (OUTPATIENT)
Facility: HOSPITAL | Age: 29
Discharge: HOME | End: 2024-11-03
Attending: OBSTETRICS & GYNECOLOGY
Payer: COMMERCIAL

## 2024-11-03 VITALS
RESPIRATION RATE: 16 BRPM | OXYGEN SATURATION: 100 % | SYSTOLIC BLOOD PRESSURE: 131 MMHG | TEMPERATURE: 98 F | DIASTOLIC BLOOD PRESSURE: 86 MMHG | HEART RATE: 92 BPM

## 2024-11-03 DIAGNOSIS — O47.03 THREATENED PRETERM LABOR, THIRD TRIMESTER: ICD-10-CM

## 2024-11-03 PROCEDURE — 99213 OFFICE O/P EST LOW 20 MIN: CPT | Mod: 25,,, | Performed by: ADVANCED PRACTICE MIDWIFE

## 2024-11-03 PROCEDURE — 99211 OFF/OP EST MAY X REQ PHY/QHP: CPT

## 2024-11-03 PROCEDURE — 81003 URINALYSIS AUTO W/O SCOPE: CPT | Performed by: ADVANCED PRACTICE MIDWIFE

## 2024-11-03 PROCEDURE — 59025 FETAL NON-STRESS TEST: CPT | Mod: 26,,, | Performed by: ADVANCED PRACTICE MIDWIFE

## 2024-11-03 RX ORDER — ONDANSETRON 8 MG/1
8 TABLET, ORALLY DISINTEGRATING ORAL EVERY 8 HOURS PRN
Status: DISCONTINUED | OUTPATIENT
Start: 2024-11-03 | End: 2024-11-03 | Stop reason: HOSPADM

## 2024-11-03 RX ORDER — ACETAMINOPHEN 500 MG
500 TABLET ORAL EVERY 6 HOURS PRN
Status: DISCONTINUED | OUTPATIENT
Start: 2024-11-03 | End: 2024-11-03 | Stop reason: HOSPADM

## 2024-11-03 NOTE — HOSPITAL COURSE
NST Reactive  UA- pending  Ctx irregular no cervical change from office  Dc home with strict precautions follow up in office this week.  Reviewed FKC, labor, and SROM precautions.

## 2024-11-03 NOTE — DISCHARGE INSTRUCTIONS
Call clinic [phone] or L & D after hours at [phone] for the following:   vaginal bleeding, leakage of fluids, regular contractions every 5 minutes for 2 hours, decreased fetal movements ( 10 kicks within 2 hours), headache not relieved by Tylenol, blurry vision, or temp of 100.4 or greater.    Begin doing fetal kick counts, at least 10 movements in 2 hours starting at 28 weeks gestation.    Keep your next clinic appointment that is currently scheduled.

## 2024-11-03 NOTE — HPI
28 yo  at 36w4d presents to L&D for complaints of contractions that started to increase in intensity this morning .States that contractions will come and get strong and then will go.  Denies LOF, vaginal bleeding. Reports good fetal movement.

## 2024-11-03 NOTE — SUBJECTIVE & OBJECTIVE
Obstetric HPI:  Patient reports irregular contractions, active fetal movement, No vaginal bleeding , No loss of fluid     This pregnancy has been complicated by   Primigravida  Uterine fibroids   Anxiety   Depressive disorder   contractions    OB History    Para Term  AB Living   1 0 0 0 0 0   SAB IAB Ectopic Multiple Live Births   0 0 0 0 0      # Outcome Date GA Lbr John/2nd Weight Sex Type Anes PTL Lv   1 Current              Past Medical History:   Diagnosis Date    Anxiety disorder, unspecified     Insomnia     Migraines      Past Surgical History:   Procedure Laterality Date    EYE SURGERY      PRK    wrist cyst removal Right        PTA Medications   Medication Sig    PNV,calcium 72-iron-folic acid (PRENATAL VITAMIN PLUS LOW IRON) 27 mg iron- 1 mg Tab Take 1 tablet (1 each total) by mouth once daily.       Review of patient's allergies indicates:  No Known Allergies     Family History       Problem Relation (Age of Onset)    Breast cancer Maternal Grandmother (66)    Cancer Maternal Grandfather    Leukemia Maternal Grandfather          Tobacco Use    Smoking status: Never     Passive exposure: Never    Smokeless tobacco: Never   Substance and Sexual Activity    Alcohol use: Not Currently     Comment: occ    Drug use: Never    Sexual activity: Yes     Partners: Male     Birth control/protection: OCP     Review of Systems   Respiratory:  Negative for shortness of breath.    Cardiovascular:  Negative for chest pain.   Gastrointestinal:  Positive for abdominal pain (mild with contractions). Negative for nausea and vomiting.   Genitourinary:  Negative for vaginal bleeding and vaginal discharge.   Neurological:  Negative for headaches.   All other systems reviewed and are negative.     Objective:     Vital Signs (Most Recent):    Vital Signs (24h Range):           There is no height or weight on file to calculate BMI.    FHT: 145 Cat 1 (reassuring)  TOCO:  Q irregular minutes     Physical Exam:    Constitutional: She is oriented to person, place, and time. She appears well-developed and well-nourished.    HENT:   Head: Normocephalic.      Cardiovascular:  Normal rate and regular rhythm.             Pulmonary/Chest: Effort normal.        Abdominal: Soft.     Genitourinary:    Vagina and uterus normal.             Musculoskeletal: Normal range of motion and moves all extremeties.       Neurological: She is alert and oriented to person, place, and time. She has normal reflexes.    Skin: Skin is warm and dry.         Cervix:  Dilation:  1  Effacement:  50%  Station: -2  Presentation: Vertex     Significant Labs:  Lab Results   Component Value Date    [facility]    HEPBSAG Non-reactive 04/01/2024       I have personallly reviewed all pertinent lab results from the last 24 hours.  Recent Lab Results       None

## 2024-11-03 NOTE — H&P
[facility] - Labor & Delivery  Obstetrics  History & Physical    Patient Name: Saima Ruiz  MRN: [id number]  Admission Date: 11/3/2024  Primary Care Provider: David Simmons MD    Subjective:     Principal Problem: contractions    History of Present Illness:  30 yo  at 36w4d presents to L&D for complaints of contractions that started to increase in intensity this morning .States that contractions will come and get strong and then will go.  Denies LOF, vaginal bleeding. Reports good fetal movement.      Obstetric HPI:  Patient reports irregular contractions, active fetal movement, No vaginal bleeding , No loss of fluid     This pregnancy has been complicated by   Primigravida  Uterine fibroids   Anxiety   Depressive disorder   contractions    OB History    Para Term  AB Living   1 0 0 0 0 0   SAB IAB Ectopic Multiple Live Births   0 0 0 0 0      # Outcome Date GA Lbr John/2nd Weight Sex Type Anes PTL Lv   1 Current              Past Medical History:   Diagnosis Date    Anxiety disorder, unspecified     Insomnia     Migraines      Past Surgical History:   Procedure Laterality Date    EYE SURGERY      PRK    wrist cyst removal Right        PTA Medications   Medication Sig    PNV,calcium 72-iron-folic acid (PRENATAL VITAMIN PLUS LOW IRON) 27 mg iron- 1 mg Tab Take 1 tablet (1 each total) by mouth once daily.       Review of patient's allergies indicates:  No Known Allergies     Family History       Problem Relation (Age of Onset)    Breast cancer Maternal Grandmother (66)    Cancer Maternal Grandfather    Leukemia Maternal Grandfather          Tobacco Use    Smoking status: Never     Passive exposure: Never    Smokeless tobacco: Never   Substance and Sexual Activity    Alcohol use: Not Currently     Comment: occ    Drug use: Never    Sexual activity: Yes     Partners: Male     Birth control/protection: OCP     Review of Systems   Respiratory:  Negative for shortness of breath.     Cardiovascular:  Negative for chest pain.   Gastrointestinal:  Positive for abdominal pain (mild with contractions). Negative for nausea and vomiting.   Genitourinary:  Negative for vaginal bleeding and vaginal discharge.   Neurological:  Negative for headaches.   All other systems reviewed and are negative.     Objective:     Vital Signs (Most Recent):    Vital Signs (24h Range):           There is no height or weight on file to calculate BMI.    FHT: 145 Cat 1 (reassuring)  TOCO:  Q irregular minutes     Physical Exam:   Constitutional: She is oriented to person, place, and time. She appears well-developed and well-nourished.    HENT:   Head: Normocephalic.      Cardiovascular:  Normal rate and regular rhythm.             Pulmonary/Chest: Effort normal.        Abdominal: Soft.     Genitourinary:    Vagina and uterus normal.             Musculoskeletal: Normal range of motion and moves all extremeties.       Neurological: She is alert and oriented to person, place, and time. She has normal reflexes.    Skin: Skin is warm and dry.         Cervix:  Dilation:  1  Effacement:  50%  Station: -2  Presentation: Vertex     Significant Labs:  Lab Results   Component Value Date    [facility]    HEPBSAG Non-reactive 2024       I have personallly reviewed all pertinent lab results from the last 24 hours.  Recent Lab Results       None          Assessment/Plan:     29 y.o. female  at 36w4d for:    *  contractions  NST Reactive  UA- pending  Ctx irregular no cervical change from office  Dc home with strict precautions follow up in office this week.  Reviewed FKC, labor, and SROM precautions.         Lisa Reddy CNM  Obstetrics  [facility] - [facility] Delivery      TISHA Pickering

## 2024-11-03 NOTE — PROCEDURES
Saima Ruiz is a 29 y.o. female patient.            Obtain Fetal nonstress test (NST)    Date/Time: 11/3/2024 8:07 AM    Performed by: Lisa Reddy CNM  Authorized by: Lisa Reddy CNM    Nonstress Test:     Variability:  6-25 BPM    Decelerations:  None    Accelerations:  15 bpm    Acoustic Stimulator: No      Baseline:  145    Uterine Irritability: No      Contractions:  Irregular    Contraction Frequency:  4-10  Biophysical Profile:     Nonstress Test Interpretation: reactive      Overall Impression:  Reassuring      11/3/2024    TISHA Pickering

## 2024-11-07 ENCOUNTER — ROUTINE PRENATAL (OUTPATIENT)
Dept: OBSTETRICS AND GYNECOLOGY | Facility: CLINIC | Age: 29
End: 2024-11-07
Payer: COMMERCIAL

## 2024-11-07 ENCOUNTER — LAB VISIT (OUTPATIENT)
Dept: LAB | Facility: HOSPITAL | Age: 29
End: 2024-11-07
Attending: MIDWIFE
Payer: COMMERCIAL

## 2024-11-07 VITALS
WEIGHT: 190.69 LBS | BODY MASS INDEX: 37.24 KG/M2 | SYSTOLIC BLOOD PRESSURE: 122 MMHG | DIASTOLIC BLOOD PRESSURE: 90 MMHG

## 2024-11-07 DIAGNOSIS — Z3A.37 37 WEEKS GESTATION OF PREGNANCY: Primary | ICD-10-CM

## 2024-11-07 DIAGNOSIS — R03.0 ELEVATED BLOOD PRESSURE READING: ICD-10-CM

## 2024-11-07 LAB
ALBUMIN SERPL BCP-MCNC: 2.8 G/DL (ref 3.5–5.2)
ALP SERPL-CCNC: 227 U/L (ref 40–150)
ALT SERPL W/O P-5'-P-CCNC: 14 U/L (ref 10–44)
ANION GAP SERPL CALC-SCNC: 9 MMOL/L (ref 8–16)
AST SERPL-CCNC: 19 U/L (ref 10–40)
BASOPHILS # BLD AUTO: 0.06 K/UL (ref 0–0.2)
BASOPHILS NFR BLD: 0.5 % (ref 0–1.9)
BILIRUB SERPL-MCNC: 0.3 MG/DL (ref 0.1–1)
BUN SERPL-MCNC: 5 MG/DL (ref 6–20)
CALCIUM SERPL-MCNC: 9.7 MG/DL (ref 8.7–10.5)
CHLORIDE SERPL-SCNC: 108 MMOL/L (ref 95–110)
CO2 SERPL-SCNC: 19 MMOL/L (ref 23–29)
CREAT SERPL-MCNC: 0.7 MG/DL (ref 0.5–1.4)
CREAT UR-MCNC: 35 MG/DL (ref 15–325)
DIFFERENTIAL METHOD BLD: ABNORMAL
EOSINOPHIL # BLD AUTO: 0.1 K/UL (ref 0–0.5)
EOSINOPHIL NFR BLD: 0.7 % (ref 0–8)
ERYTHROCYTE [DISTWIDTH] IN BLOOD BY AUTOMATED COUNT: 16 % (ref 11.5–14.5)
EST. GFR  (NO RACE VARIABLE): >60 ML/MIN/1.73 M^2
GLUCOSE SERPL-MCNC: 77 MG/DL (ref 70–110)
HCT VFR BLD AUTO: 38 % (ref 37–48.5)
HGB BLD-MCNC: 12.9 G/DL (ref 12–16)
IMM GRANULOCYTES # BLD AUTO: 0.15 K/UL (ref 0–0.04)
IMM GRANULOCYTES NFR BLD AUTO: 1.3 % (ref 0–0.5)
LYMPHOCYTES # BLD AUTO: 2.9 K/UL (ref 1–4.8)
LYMPHOCYTES NFR BLD: 25 % (ref 18–48)
MCH RBC QN AUTO: 30.1 PG (ref 27–31)
MCHC RBC AUTO-ENTMCNC: 33.9 G/DL (ref 32–36)
MCV RBC AUTO: 89 FL (ref 82–98)
MONOCYTES # BLD AUTO: 0.9 K/UL (ref 0.3–1)
MONOCYTES NFR BLD: 7.4 % (ref 4–15)
NEUTROPHILS # BLD AUTO: 7.7 K/UL (ref 1.8–7.7)
NEUTROPHILS NFR BLD: 65.1 % (ref 38–73)
NRBC BLD-RTO: 0 /100 WBC
PLATELET # BLD AUTO: 177 K/UL (ref 150–450)
PMV BLD AUTO: 11.9 FL (ref 9.2–12.9)
POTASSIUM SERPL-SCNC: 3.9 MMOL/L (ref 3.5–5.1)
PROT SERPL-MCNC: 6.7 G/DL (ref 6–8.4)
PROT UR-MCNC: <7 MG/DL (ref 0–15)
PROT/CREAT UR: NORMAL MG/G{CREAT} (ref 0–0.2)
RBC # BLD AUTO: 4.29 M/UL (ref 4–5.4)
SODIUM SERPL-SCNC: 136 MMOL/L (ref 136–145)
WBC # BLD AUTO: 11.77 K/UL (ref 3.9–12.7)

## 2024-11-07 PROCEDURE — 80053 COMPREHEN METABOLIC PANEL: CPT | Performed by: MIDWIFE

## 2024-11-07 PROCEDURE — 82570 ASSAY OF URINE CREATININE: CPT | Performed by: MIDWIFE

## 2024-11-07 PROCEDURE — 85025 COMPLETE CBC W/AUTO DIFF WBC: CPT | Performed by: MIDWIFE

## 2024-11-07 PROCEDURE — 99999 PR PBB SHADOW E&M-EST. PATIENT-LVL II: CPT | Mod: PBBFAC,,, | Performed by: MIDWIFE

## 2024-11-07 PROCEDURE — 36415 COLL VENOUS BLD VENIPUNCTURE: CPT | Performed by: MIDWIFE

## 2024-11-07 NOTE — PROGRESS NOTES
37 weeks gestation of pregnancy    Doing well, no complaints  Good fetal movement  Denies contractions, VB, LOF  VE per request, female chaperone present for exam  Undecided on contraception PP --would like to address at PP visit  GBS negative  Out of RSV vaccine in clinic  26lb 7.3oz TWG, S=D  FKC's and labor precautions  RTC in 1 week, sooner if needed    Elevated blood pressure reading  -     CBC W/ AUTO DIFFERENTIAL; Future; Expected date: 11/07/2024  -     COMPREHENSIVE METABOLIC PANEL; Future; Expected date: 11/07/2024  -     Protein / creatinine ratio, urine     BP mildly elevated. Denies any s/s of pre-E. Strict pre-E precautions reviewed. Baseline work-up today.

## 2024-11-14 ENCOUNTER — ROUTINE PRENATAL (OUTPATIENT)
Dept: OBSTETRICS AND GYNECOLOGY | Facility: CLINIC | Age: 29
End: 2024-11-14
Payer: COMMERCIAL

## 2024-11-14 VITALS
BODY MASS INDEX: 37.67 KG/M2 | DIASTOLIC BLOOD PRESSURE: 82 MMHG | SYSTOLIC BLOOD PRESSURE: 126 MMHG | WEIGHT: 192.88 LBS

## 2024-11-14 DIAGNOSIS — Z3A.38 38 WEEKS GESTATION OF PREGNANCY: Primary | ICD-10-CM

## 2024-11-14 DIAGNOSIS — O34.13 LEIOMYOMA OF UTERUS AFFECTING PREGNANCY IN THIRD TRIMESTER: ICD-10-CM

## 2024-11-14 DIAGNOSIS — D25.9 LEIOMYOMA OF UTERUS AFFECTING PREGNANCY IN THIRD TRIMESTER: ICD-10-CM

## 2024-11-14 PROBLEM — O47.00 PRETERM CONTRACTIONS: Status: RESOLVED | Noted: 2024-08-27 | Resolved: 2024-11-14

## 2024-11-14 PROCEDURE — 99999 PR PBB SHADOW E&M-EST. PATIENT-LVL II: CPT | Mod: PBBFAC,,, | Performed by: MIDWIFE

## 2024-11-14 NOTE — PROGRESS NOTES
38 weeks gestation of pregnancy    Ready for baby!!!  Good fetal movement  Has contractions on and off, but nothing consistent. No VB or LOF.  BP normal today, PIH work-up normal 11/7  VE per request, female chaperone present for exam  Would like IOL close to [facility] if no baby by then  28lb 10.6oz TWG, S=D  FKC's and labor precautions  RTC in 1 week, sooner if needed     Leiomyoma of uterus affecting pregnancy in third trimester     3T growth scan normal

## 2024-11-20 ENCOUNTER — ROUTINE PRENATAL (OUTPATIENT)
Dept: OBSTETRICS AND GYNECOLOGY | Facility: CLINIC | Age: 29
End: 2024-11-20
Payer: COMMERCIAL

## 2024-11-20 VITALS
DIASTOLIC BLOOD PRESSURE: 84 MMHG | BODY MASS INDEX: 37.85 KG/M2 | WEIGHT: 193.81 LBS | SYSTOLIC BLOOD PRESSURE: 118 MMHG

## 2024-11-20 DIAGNOSIS — Z34.90 ENCOUNTER FOR ELECTIVE INDUCTION OF LABOR: ICD-10-CM

## 2024-11-20 DIAGNOSIS — Z3A.39 39 WEEKS GESTATION OF PREGNANCY: Primary | ICD-10-CM

## 2024-11-20 PROCEDURE — 99999 PR PBB SHADOW E&M-EST. PATIENT-LVL II: CPT | Mod: PBBFAC,,, | Performed by: MIDWIFE

## 2024-11-20 PROCEDURE — 0502F SUBSEQUENT PRENATAL CARE: CPT | Mod: CPTII,S$GLB,, | Performed by: MIDWIFE

## 2024-11-20 NOTE — PROGRESS NOTES
39 weeks gestation of pregnancy    Overall doing well, good fetal movement  Having contractions on and off. No VB or LOF.   Patient request membrane sweep, but unable to because cervix is a FT. Chaperone present for exam.   29lb 8.7oz TWG, S=D  Labor precautions  RTC in 1 week, sooner if needed     Encounter for elective induction of labor  -     IP OB Labor Induction; Future     Patient requesting IOL. Scheduled 11/30 at midnight. Discussed cervical exam and discussing induction methods next week if no baby by then.

## 2024-11-24 ENCOUNTER — PATIENT MESSAGE (OUTPATIENT)
Dept: OBSTETRICS AND GYNECOLOGY | Facility: CLINIC | Age: 29
End: 2024-11-24
Payer: COMMERCIAL

## 2024-11-26 ENCOUNTER — TELEPHONE (OUTPATIENT)
Dept: OBSTETRICS AND GYNECOLOGY | Facility: CLINIC | Age: 29
End: 2024-11-26
Payer: COMMERCIAL

## 2024-11-26 ENCOUNTER — HOSPITAL ENCOUNTER (INPATIENT)
Facility: HOSPITAL | Age: 29
LOS: 3 days | Discharge: HOME | End: 2024-11-29
Attending: OBSTETRICS & GYNECOLOGY | Admitting: OBSTETRICS & GYNECOLOGY
Payer: COMMERCIAL

## 2024-11-26 ENCOUNTER — PATIENT MESSAGE (OUTPATIENT)
Dept: OBSTETRICS AND GYNECOLOGY | Facility: CLINIC | Age: 29
End: 2024-11-26
Payer: COMMERCIAL

## 2024-11-26 DIAGNOSIS — O13.3 GESTATIONAL HYPERTENSION, THIRD TRIMESTER: ICD-10-CM

## 2024-11-26 DIAGNOSIS — O41.1230 CHORIOAMNIONITIS IN THIRD TRIMESTER, SINGLE OR UNSPECIFIED FETUS: ICD-10-CM

## 2024-11-26 DIAGNOSIS — O16.3 ELEVATED BLOOD PRESSURE AFFECTING PREGNANCY IN THIRD TRIMESTER, ANTEPARTUM: ICD-10-CM

## 2024-11-26 LAB
ABO + RH BLD: NORMAL
ALBUMIN SERPL BCP-MCNC: 3 G/DL (ref 3.5–5.2)
ALP SERPL-CCNC: 294 U/L (ref 40–150)
ALT SERPL W/O P-5'-P-CCNC: 16 U/L (ref 10–44)
ANION GAP SERPL CALC-SCNC: 14 MMOL/L (ref 8–16)
AST SERPL-CCNC: 16 U/L (ref 10–40)
BASOPHILS # BLD AUTO: 0.05 K/UL (ref 0–0.2)
BASOPHILS NFR BLD: 0.4 % (ref 0–1.9)
BILIRUB SERPL-MCNC: 0.2 MG/DL (ref 0.1–1)
BLD GP AB SCN CELLS X3 SERPL QL: NORMAL
BUN SERPL-MCNC: 5 MG/DL (ref 6–20)
CALCIUM SERPL-MCNC: 9.4 MG/DL (ref 8.7–10.5)
CHLORIDE SERPL-SCNC: 109 MMOL/L (ref 95–110)
CO2 SERPL-SCNC: 14 MMOL/L (ref 23–29)
CREAT SERPL-MCNC: 0.8 MG/DL (ref 0.5–1.4)
CREAT UR-MCNC: 162.3 MG/DL (ref 15–325)
DIFFERENTIAL METHOD BLD: ABNORMAL
EOSINOPHIL # BLD AUTO: 0.1 K/UL (ref 0–0.5)
EOSINOPHIL NFR BLD: 0.4 % (ref 0–8)
ERYTHROCYTE [DISTWIDTH] IN BLOOD BY AUTOMATED COUNT: 14.3 % (ref 11.5–14.5)
EST. GFR  (NO RACE VARIABLE): >60 ML/MIN/1.73 M^2
GLUCOSE SERPL-MCNC: 119 MG/DL (ref 70–110)
HCT VFR BLD AUTO: 41.4 % (ref 37–48.5)
HGB BLD-MCNC: 13.6 G/DL (ref 12–16)
HIV 1+2 AB+HIV1 P24 AG SERPL QL IA: NEGATIVE
IMM GRANULOCYTES # BLD AUTO: 0.09 K/UL (ref 0–0.04)
IMM GRANULOCYTES NFR BLD AUTO: 0.8 % (ref 0–0.5)
LYMPHOCYTES # BLD AUTO: 2.5 K/UL (ref 1–4.8)
LYMPHOCYTES NFR BLD: 21.6 % (ref 18–48)
MCH RBC QN AUTO: 28.2 PG (ref 27–31)
MCHC RBC AUTO-ENTMCNC: 32.9 G/DL (ref 32–36)
MCV RBC AUTO: 86 FL (ref 82–98)
MONOCYTES # BLD AUTO: 0.6 K/UL (ref 0.3–1)
MONOCYTES NFR BLD: 5.6 % (ref 4–15)
NEUTROPHILS # BLD AUTO: 8.2 K/UL (ref 1.8–7.7)
NEUTROPHILS NFR BLD: 71.2 % (ref 38–73)
NRBC BLD-RTO: 0 /100 WBC
PLATELET # BLD AUTO: 194 K/UL (ref 150–450)
PMV BLD AUTO: 12.7 FL (ref 9.2–12.9)
POTASSIUM SERPL-SCNC: 3.7 MMOL/L (ref 3.5–5.1)
PROT SERPL-MCNC: 6.9 G/DL (ref 6–8.4)
PROT UR-MCNC: 14 MG/DL (ref 0–15)
PROT/CREAT UR: 0.09 MG/G{CREAT} (ref 0–0.2)
RBC # BLD AUTO: 4.82 M/UL (ref 4–5.4)
SODIUM SERPL-SCNC: 137 MMOL/L (ref 136–145)
TREPONEMA PALLIDUM IGG+IGM AB [PRESENCE] IN SERUM OR PLASMA BY IMMUNOASSAY: NONREACTIVE
WBC # BLD AUTO: 11.46 K/UL (ref 3.9–12.7)

## 2024-11-26 PROCEDURE — 80053 COMPREHEN METABOLIC PANEL: CPT | Performed by: ADVANCED PRACTICE MIDWIFE

## 2024-11-26 PROCEDURE — 25000003 PHARM REV CODE 250: Performed by: ADVANCED PRACTICE MIDWIFE

## 2024-11-26 PROCEDURE — 82570 ASSAY OF URINE CREATININE: CPT | Performed by: ADVANCED PRACTICE MIDWIFE

## 2024-11-26 PROCEDURE — 99211 OFF/OP EST MAY X REQ PHY/QHP: CPT | Mod: 25

## 2024-11-26 PROCEDURE — 86901 BLOOD TYPING SEROLOGIC RH(D): CPT | Performed by: ADVANCED PRACTICE MIDWIFE

## 2024-11-26 PROCEDURE — 87389 HIV-1 AG W/HIV-1&-2 AB AG IA: CPT | Performed by: ADVANCED PRACTICE MIDWIFE

## 2024-11-26 PROCEDURE — 72100002 HC LABOR CARE, 1ST 8 HOURS

## 2024-11-26 PROCEDURE — 86593 SYPHILIS TEST NON-TREP QUANT: CPT | Performed by: ADVANCED PRACTICE MIDWIFE

## 2024-11-26 PROCEDURE — 11000001 HC ACUTE MED/SURG PRIVATE ROOM

## 2024-11-26 PROCEDURE — 72100003 HC LABOR CARE, EA. ADDL. 8 HRS

## 2024-11-26 PROCEDURE — 85025 COMPLETE CBC W/AUTO DIFF WBC: CPT | Performed by: ADVANCED PRACTICE MIDWIFE

## 2024-11-26 PROCEDURE — 63600175 PHARM REV CODE 636 W HCPCS: Performed by: ADVANCED PRACTICE MIDWIFE

## 2024-11-26 RX ORDER — MISOPROSTOL 100 MCG
25 TABLET ORAL EVERY 4 HOURS PRN
Status: DISCONTINUED | OUTPATIENT
Start: 2024-11-26 | End: 2024-11-26

## 2024-11-26 RX ORDER — CALCIUM CARBONATE 200(500)MG
500 TABLET,CHEWABLE ORAL 3 TIMES DAILY PRN
Status: DISCONTINUED | OUTPATIENT
Start: 2024-11-26 | End: 2024-11-28

## 2024-11-26 RX ORDER — MISOPROSTOL 200 UG/1
800 TABLET ORAL ONCE AS NEEDED
Status: DISCONTINUED | OUTPATIENT
Start: 2024-11-26 | End: 2024-11-28

## 2024-11-26 RX ORDER — TRANEXAMIC ACID 10 MG/ML
1000 INJECTION, SOLUTION INTRAVENOUS EVERY 30 MIN PRN
Status: DISCONTINUED | OUTPATIENT
Start: 2024-11-26 | End: 2024-11-28

## 2024-11-26 RX ORDER — OXYTOCIN-SODIUM CHLORIDE 0.9% IV SOLN 30 UNIT/500ML 30-0.9/5 UT/ML-%
10 SOLUTION INTRAVENOUS ONCE AS NEEDED
Status: DISCONTINUED | OUTPATIENT
Start: 2024-11-26 | End: 2024-11-28

## 2024-11-26 RX ORDER — ONDANSETRON 8 MG/1
8 TABLET, ORALLY DISINTEGRATING ORAL EVERY 8 HOURS PRN
Status: DISCONTINUED | OUTPATIENT
Start: 2024-11-26 | End: 2024-11-28

## 2024-11-26 RX ORDER — OXYTOCIN-SODIUM CHLORIDE 0.9% IV SOLN 30 UNIT/500ML 30-0.9/5 UT/ML-%
20 SOLUTION INTRAVENOUS ONCE AS NEEDED
Status: DISCONTINUED | OUTPATIENT
Start: 2024-11-26 | End: 2024-11-28

## 2024-11-26 RX ORDER — METHYLERGONOVINE MALEATE 0.2 MG/ML
200 INJECTION INTRAVENOUS ONCE AS NEEDED
Status: DISCONTINUED | OUTPATIENT
Start: 2024-11-26 | End: 2024-11-28

## 2024-11-26 RX ORDER — OXYTOCIN/0.9 % SODIUM CHLORIDE 15/250 ML
95 PLASTIC BAG, INJECTION (ML) INTRAVENOUS CONTINUOUS PRN
Status: DISCONTINUED | OUTPATIENT
Start: 2024-11-26 | End: 2024-11-28

## 2024-11-26 RX ORDER — SODIUM CHLORIDE 9 MG/ML
INJECTION, SOLUTION INTRAVENOUS
Status: DISCONTINUED | OUTPATIENT
Start: 2024-11-26 | End: 2024-11-28

## 2024-11-26 RX ORDER — OXYTOCIN/0.9 % SODIUM CHLORIDE 15/250 ML
95 PLASTIC BAG, INJECTION (ML) INTRAVENOUS ONCE AS NEEDED
Status: DISCONTINUED | OUTPATIENT
Start: 2024-11-26 | End: 2024-11-28

## 2024-11-26 RX ORDER — LIDOCAINE HYDROCHLORIDE 10 MG/ML
10 INJECTION, SOLUTION EPIDURAL; INFILTRATION; INTRACAUDAL; PERINEURAL ONCE AS NEEDED
Status: DISCONTINUED | OUTPATIENT
Start: 2024-11-26 | End: 2024-11-28

## 2024-11-26 RX ORDER — DIPHENOXYLATE HYDROCHLORIDE AND ATROPINE SULFATE 2.5; .025 MG/1; MG/1
2 TABLET ORAL EVERY 6 HOURS PRN
Status: DISCONTINUED | OUTPATIENT
Start: 2024-11-26 | End: 2024-11-28

## 2024-11-26 RX ORDER — BUTALBITAL, ACETAMINOPHEN AND CAFFEINE 50; 325; 40 MG/1; MG/1; MG/1
1 TABLET ORAL EVERY 4 HOURS PRN
Status: DISCONTINUED | OUTPATIENT
Start: 2024-11-26 | End: 2024-11-28

## 2024-11-26 RX ORDER — OXYTOCIN 10 [USP'U]/ML
10 INJECTION, SOLUTION INTRAMUSCULAR; INTRAVENOUS ONCE AS NEEDED
Status: DISCONTINUED | OUTPATIENT
Start: 2024-11-26 | End: 2024-11-28

## 2024-11-26 RX ORDER — TERBUTALINE SULFATE 1 MG/ML
0.25 INJECTION SUBCUTANEOUS
Status: DISCONTINUED | OUTPATIENT
Start: 2024-11-26 | End: 2024-11-28

## 2024-11-26 RX ORDER — CARBOPROST TROMETHAMINE 250 UG/ML
250 INJECTION, SOLUTION INTRAMUSCULAR
Status: DISCONTINUED | OUTPATIENT
Start: 2024-11-26 | End: 2024-11-28

## 2024-11-26 RX ORDER — SIMETHICONE 80 MG
1 TABLET,CHEWABLE ORAL 4 TIMES DAILY PRN
Status: DISCONTINUED | OUTPATIENT
Start: 2024-11-26 | End: 2024-11-28

## 2024-11-26 RX ORDER — HYDROXYZINE HYDROCHLORIDE 25 MG/ML
50 INJECTION, SOLUTION INTRAMUSCULAR ONCE AS NEEDED
Status: DISCONTINUED | OUTPATIENT
Start: 2024-11-26 | End: 2024-11-26

## 2024-11-26 RX ORDER — HYDROXYZINE PAMOATE 25 MG/1
100 CAPSULE ORAL ONCE AS NEEDED
Status: COMPLETED | OUTPATIENT
Start: 2024-11-26 | End: 2024-11-26

## 2024-11-26 RX ORDER — HYDROXYZINE PAMOATE 25 MG/1
25 CAPSULE ORAL EVERY 8 HOURS PRN
Status: CANCELLED | OUTPATIENT
Start: 2024-11-26

## 2024-11-26 RX ORDER — FENTANYL CITRATE 50 UG/ML
100 INJECTION, SOLUTION INTRAMUSCULAR; INTRAVENOUS
Status: DISCONTINUED | OUTPATIENT
Start: 2024-11-26 | End: 2024-11-28

## 2024-11-26 RX ORDER — SODIUM CHLORIDE, SODIUM LACTATE, POTASSIUM CHLORIDE, CALCIUM CHLORIDE 600; 310; 30; 20 MG/100ML; MG/100ML; MG/100ML; MG/100ML
INJECTION, SOLUTION INTRAVENOUS CONTINUOUS
Status: DISCONTINUED | OUTPATIENT
Start: 2024-11-26 | End: 2024-11-28

## 2024-11-26 RX ADMIN — FENTANYL CITRATE 100 MCG: 50 INJECTION INTRAMUSCULAR; INTRAVENOUS at 08:11

## 2024-11-26 RX ADMIN — HYDROXYZINE PAMOATE 100 MG: 25 CAPSULE ORAL at 11:11

## 2024-11-26 RX ADMIN — MISOPROSTOL 50 MCG: 100 TABLET ORAL at 11:11

## 2024-11-26 RX ADMIN — MISOPROSTOL 50 MCG: 100 TABLET ORAL at 02:11

## 2024-11-26 RX ADMIN — MISOPROSTOL 50 MCG: 100 TABLET ORAL at 07:11

## 2024-11-26 RX ADMIN — BUTALBITAL, ACETAMINOPHEN, AND CAFFEINE 1 TABLET: 325; 50; 40 TABLET ORAL at 02:11

## 2024-11-26 NOTE — TELEPHONE ENCOUNTER
Spoke with patient. Patient states having high blood pressure. Last reading 152/97. Patient also states having a mild headache. Patient had appointment today at 3:45 pm. Advised patient to go to Labor and Delivery and not wait for appointment. Patient voiced understanding.

## 2024-11-26 NOTE — H&P
"  [facility] - Labor & Delivery  Obstetrics  History & Physical    Patient Name: Saima Ruiz  MRN: [id number]  Admission Date: 2024  Primary Care Provider: David Simmons MD    Subjective:     Principal Problem:Gestational hypertension, third trimester    History of Present Illness:  29 y.o.  at 39w6d presents to L&D with complaints of elevated BP at home (150s/90s) with a worsening headache. She denies any other s/s of pre-e, however she states that she just feels "uneasy" about everything. She has had good FM and denies vaginal bleeding, however she is starting to contract more.     Obstetric HPI:  Patient reports some contractions, active fetal movement, No vaginal bleeding , No loss of fluid     This pregnancy has been complicated by   Fibroids  Anxiety/depression    OB History    Para Term  AB Living   1 0 0 0 0 0   SAB IAB Ectopic Multiple Live Births   0 0 0 0 0      # Outcome Date GA Lbr John/2nd Weight Sex Type Anes PTL Lv   1 Current              Past Medical History:   Diagnosis Date    Anxiety disorder, unspecified     Insomnia     Migraines      Past Surgical History:   Procedure Laterality Date    EYE SURGERY      PRK    wrist cyst removal Right        PTA Medications   Medication Sig    PNV,calcium 72-iron-folic acid (PRENATAL VITAMIN PLUS LOW IRON) 27 mg iron- 1 mg Tab Take 1 tablet (1 each total) by mouth once daily.       Review of patient's allergies indicates:  No Known Allergies     Family History       Problem Relation (Age of Onset)    Breast cancer Maternal Grandmother (66)    Cancer Maternal Grandfather    Leukemia Maternal Grandfather          Tobacco Use    Smoking status: Never     Passive exposure: Never    Smokeless tobacco: Never   Substance and Sexual Activity    Alcohol use: Not Currently     Comment: occ    Drug use: Never    Sexual activity: Yes     Partners: Male     Birth control/protection: OCP     Review of Systems   Respiratory:  Negative for " shortness of breath.    Cardiovascular:  Negative for chest pain.   Gastrointestinal:  Positive for abdominal pain. Negative for nausea and vomiting.   Genitourinary:  Negative for vaginal bleeding and vaginal discharge.   Neurological:  Positive for headaches.   All other systems reviewed and are negative.     Objective:     Vital Signs (Most Recent):    Vital Signs (24h Range):           There is no height or weight on file to calculate BMI.    FHT: 140 Cat 1 (reassuring)  TOCO:  Q irritability minutes     Physical Exam:   Constitutional: She is oriented to person, place, and time. She appears well-developed and well-nourished.    HENT:   Head: Normocephalic.      Cardiovascular:  Normal rate, regular rhythm and normal heart sounds.             Pulmonary/Chest: Effort normal and breath sounds normal.        Abdominal: Soft.     Genitourinary:    Vagina and uterus normal.             Musculoskeletal: Normal range of motion and moves all extremeties.       Neurological: She is alert and oriented to person, place, and time. She has normal reflexes.    Skin: Skin is warm and dry.         Cervix:  Dilation:  1  Effacement:  60  Station: -2  Presentation: Vertex     Significant Labs:  Lab Results   Component Value Date    [facility]    HEPBSAG Non-reactive 2024       I have personallly reviewed all pertinent lab results from the last 24 hours.  Assessment/Plan:     29 y.o. female  at 39w6d for:    * Gestational hypertension, third trimester  First couple of -160/80-90's. Admit for IOL secondary to GHTN. Pre-e work up in progress. Discussed possible lengthy labor process. Will start with cytotec. Desires BRENDA. Patient agreeable to POC.     Situational mixed anxiety and depressive disorder  Monitor postpartum     Uterine fibroids affecting pregnancy  Monitor for PPH        Lisa Reddy CNM  Obstetrics  [facility] - [facility]

## 2024-11-26 NOTE — ASSESSMENT & PLAN NOTE
First couple of -160/80-90's. Admit for IOL secondary to GHTN. Pre-e work up in progress. Discussed possible lengthy labor process. Will start with cytotec. Desires BRENDA. Patient agreeable to POC.

## 2024-11-26 NOTE — HPI
"29 y.o.  at 39w6d presents to L&D with complaints of elevated BP at home (150s/90s) with a worsening headache. She denies any other s/s of pre-e, however she states that she just feels "uneasy" about everything. She has had good FM and denies vaginal bleeding, however she is starting to contract more.   "

## 2024-11-26 NOTE — HOSPITAL COURSE
Admit for IOL secondary to GHTN. Pre-e work up in progress. Discussed possible lengthy labor process. Will start with cytotec. Desires BRENDA. Patient agreeable to POC.   11/27/24 Cooks in, will initiate pitocin    PPD1.5: Routine PP care. Bps controlled. Afebrile 24+ hours. Pre E and DC instructions reviewed.

## 2024-11-27 ENCOUNTER — ANESTHESIA EVENT (OUTPATIENT)
Dept: OBSTETRICS AND GYNECOLOGY | Facility: HOSPITAL | Age: 29
End: 2024-11-27
Payer: COMMERCIAL

## 2024-11-27 ENCOUNTER — ANESTHESIA (OUTPATIENT)
Dept: OBSTETRICS AND GYNECOLOGY | Facility: HOSPITAL | Age: 29
End: 2024-11-27
Payer: COMMERCIAL

## 2024-11-27 PROCEDURE — 63600175 PHARM REV CODE 636 W HCPCS: Performed by: NURSE ANESTHETIST, CERTIFIED REGISTERED

## 2024-11-27 PROCEDURE — C1751 CATH, INF, PER/CENT/MIDLINE: HCPCS | Performed by: ANESTHESIOLOGY

## 2024-11-27 PROCEDURE — 27000181 HC CABLE, IUPC

## 2024-11-27 PROCEDURE — 63600175 PHARM REV CODE 636 W HCPCS: Performed by: ADVANCED PRACTICE MIDWIFE

## 2024-11-27 PROCEDURE — 11000001 HC ACUTE MED/SURG PRIVATE ROOM

## 2024-11-27 PROCEDURE — 25000003 PHARM REV CODE 250: Performed by: ADVANCED PRACTICE MIDWIFE

## 2024-11-27 PROCEDURE — 63600175 PHARM REV CODE 636 W HCPCS: Performed by: ANESTHESIOLOGY

## 2024-11-27 PROCEDURE — 62326 NJX INTERLAMINAR LMBR/SAC: CPT | Performed by: NURSE ANESTHETIST, CERTIFIED REGISTERED

## 2024-11-27 PROCEDURE — 72100003 HC LABOR CARE, EA. ADDL. 8 HRS

## 2024-11-27 PROCEDURE — 51702 INSERT TEMP BLADDER CATH: CPT

## 2024-11-27 PROCEDURE — 59200 INSERT CERVICAL DILATOR: CPT | Mod: ,,, | Performed by: ADVANCED PRACTICE MIDWIFE

## 2024-11-27 PROCEDURE — 59200 INSERT CERVICAL DILATOR: CPT

## 2024-11-27 PROCEDURE — 27200710 HC EPIDURAL INFUSION PUMP SET: Performed by: ANESTHESIOLOGY

## 2024-11-27 RX ORDER — ROPIVACAINE HYDROCHLORIDE 2 MG/ML
12 INJECTION, SOLUTION EPIDURAL; INFILTRATION CONTINUOUS
Status: DISCONTINUED | OUTPATIENT
Start: 2024-11-27 | End: 2024-11-28

## 2024-11-27 RX ORDER — SODIUM CITRATE AND CITRIC ACID MONOHYDRATE 334; 500 MG/5ML; MG/5ML
30 SOLUTION ORAL ONCE
Status: DISCONTINUED | OUTPATIENT
Start: 2024-11-27 | End: 2024-11-28

## 2024-11-27 RX ORDER — ACETAMINOPHEN 500 MG
1000 TABLET ORAL ONCE
Status: COMPLETED | OUTPATIENT
Start: 2024-11-27 | End: 2024-11-27

## 2024-11-27 RX ORDER — OXYTOCIN/0.9 % SODIUM CHLORIDE 15/250 ML
0-32 PLASTIC BAG, INJECTION (ML) INTRAVENOUS CONTINUOUS
Status: DISCONTINUED | OUTPATIENT
Start: 2024-11-27 | End: 2024-11-28

## 2024-11-27 RX ORDER — FAMOTIDINE 10 MG/ML
20 INJECTION INTRAVENOUS ONCE
Status: DISCONTINUED | OUTPATIENT
Start: 2024-11-27 | End: 2024-11-28

## 2024-11-27 RX ORDER — LIDOCAINE HYDROCHLORIDE AND EPINEPHRINE 15; 5 MG/ML; UG/ML
INJECTION, SOLUTION EPIDURAL
Status: DISCONTINUED | OUTPATIENT
Start: 2024-11-27 | End: 2024-11-28

## 2024-11-27 RX ADMIN — ROPIVACAINE HYDROCHLORIDE 6 ML: 2 INJECTION, SOLUTION EPIDURAL; INFILTRATION at 05:11

## 2024-11-27 RX ADMIN — ONDANSETRON 8 MG: 8 TABLET, ORALLY DISINTEGRATING ORAL at 05:11

## 2024-11-27 RX ADMIN — FENTANYL CITRATE 100 MCG: 50 INJECTION INTRAMUSCULAR; INTRAVENOUS at 01:11

## 2024-11-27 RX ADMIN — MISOPROSTOL 50 MCG: 100 TABLET ORAL at 05:11

## 2024-11-27 RX ADMIN — ACETAMINOPHEN 1000 MG: 500 TABLET ORAL at 09:11

## 2024-11-27 RX ADMIN — SODIUM CHLORIDE, POTASSIUM CHLORIDE, SODIUM LACTATE AND CALCIUM CHLORIDE 1000 ML: 600; 310; 30; 20 INJECTION, SOLUTION INTRAVENOUS at 04:11

## 2024-11-27 RX ADMIN — LIDOCAINE HYDROCHLORIDE,EPINEPHRINE BITARTRATE 3 ML: 15; .005 INJECTION, SOLUTION EPIDURAL; INFILTRATION; INTRACAUDAL; PERINEURAL at 05:11

## 2024-11-27 RX ADMIN — OXYTOCIN 2 MILLI-UNITS/MIN: 10 INJECTION, SOLUTION INTRAMUSCULAR; INTRAVENOUS at 10:11

## 2024-11-27 NOTE — PROGRESS NOTES
S: comfortable with epidural  O:  VS reviewed, afebrile   Vitals:    24 1100 24 1115 24 1130 24 1145   BP: (!) 151/80 (!) 148/79 119/76 132/62   Pulse: 99  (!) 115 99   Resp:       Temp:       TempSrc:       SpO2: 100%  100% 98%       FHTs:  CAT 2 reassuring Baseline 160's afebrile, good BTBV, occasional variable   UC: Q 3-5  SVE: 4/80/V/-2, AROM meconium fluid noted.  NICU notified    A: IUP @ 40w0d ;     Patient Active Problem List   Diagnosis    Primigravida    Uterine fibroids affecting pregnancy    Situational mixed anxiety and depressive disorder    Gestational hypertension, third trimester       P: CPM  Anticipate progress to   Continue close monitoring of maternal/fetal status

## 2024-11-27 NOTE — PROGRESS NOTES
S: comfortable with epidural  Family at bedside and supportive  O:  VS reviewed, afebrile   Vitals:    11/27/24 1600 11/27/24 1615 11/27/24 1630 11/27/24 1645   BP: 121/74  124/72 120/73   Pulse: 93 81  93   Resp:       Temp:       TempSrc:       SpO2: 97% 97%  98%       FHTs:  CAT 2, reassuring moderate BTBV with intermittent late decelerations  UC: IUPC in place contractions adequate at toimes  SVE:  7/90/V/0. No change    A: IUP @ 40w0d ;     Patient Active Problem List   Diagnosis    Primigravida    Uterine fibroids affecting pregnancy    Situational mixed anxiety and depressive disorder    Gestational hypertension, third trimester       P: Continue increasing pitocin  Continue close monitoring of maternal/fetal status

## 2024-11-27 NOTE — PROGRESS NOTES
S: Feeling contractions but coping well.   O:  VS reviewed, afebrile   Vitals:    11/26/24 1745 11/26/24 1800 11/26/24 1815 11/26/24 1900   BP: 126/72 138/77 121/80 138/83   Pulse: 99 (!) 59 95 101   Resp:    18   Temp:    98 °F (36.7 °C)   TempSrc:    Oral   SpO2: 99% 95% 98% 98%       FHTs 135 cat 1, reassuring  UC 2-5  SVE 1.5/70/-2    A: IUP @ 39w6d ;     Patient Active Problem List   Diagnosis    Primigravida    Uterine fibroids affecting pregnancy    Situational mixed anxiety and depressive disorder    Gestational hypertension, third trimester       P:   Continue close monitoring of maternal/fetal status  Will do another dose of cytotec then consider CRB.

## 2024-11-27 NOTE — ANESTHESIA PREPROCEDURE EVALUATION
2024  Saima Ruiz is a 29 y.o., female.      Pre-op Assessment    I have reviewed the Patient Summary Reports.     I have reviewed the Nursing Notes.    I have reviewed the Medications.     Review of Systems  Anesthesia Hx:   Neg history of prior surgery.          Denies Family Hx of Anesthesia complications.    Denies Personal Hx of Anesthesia complications.                    Cardiovascular:     Hypertension                                          OB/GYN/PEDS:    Planned Vaginal Delivery                     Neurological:      Headaches                                 Psych:  Psychiatric History                  Physical Exam  General: Well nourished, Cooperative and Alert    Airway:  Mallampati: II   Mouth Opening: Normal  TM Distance: Normal  Tongue: Normal  Neck ROM: Normal ROM    Dental:  Intact    Chest/Lungs:  Clear to auscultation, Normal Respiratory Rate    Heart:  Rate: Normal  Rhythm: Regular Rhythm  Sounds: Normal      Lab Results   Component Value Date    WBC 11.46 2024    HGB 13.6 2024    HCT 41.4 2024    MCV 86 2024     2024       OB History          1    Para   0    Term   0       0    AB   0    Living   0         SAB   0    IAB   0    Ectopic   0    Multiple   0    Live Births                   Patient Active Problem List   Diagnosis    Primigravida    Uterine fibroids affecting pregnancy    Situational mixed anxiety and depressive disorder    Gestational hypertension, third trimester         Anesthesia Plan  Type of Anesthesia, risks & benefits discussed:    Anesthesia Type: Epidural  Intra-op Monitoring Plan: Standard ASA Monitors  Post Op Pain Control Plan: epidural analgesia  Informed Consent: Informed consent signed with the Patient and all parties understand the risks and agree with anesthesia plan.  All questions  answered.   ASA Score: 2  Day of Surgery Review of History & Physical: H&P Update referred to the surgeon/provider.    Ready For Surgery From Anesthesia Perspective.     .

## 2024-11-27 NOTE — ANESTHESIA PROCEDURE NOTES
Epidural    Patient location during procedure: OB   Reason for block: primary anesthetic   Reason for block: labor analgesia requested by patient and obstetrician  Diagnosis: IUP   Start time: 11/27/2024 5:02 AM  Timeout: 11/27/2024 5:02 AM  End time: 11/27/2024 5:09 AM  Surgery related to: Planned vaginal delivery    Staffing  Performing Provider: Guillaume Sylvester CRNA  Authorizing Provider: Kennedy Nguyễn II, MD    Staffing  Performed by: Guillaume Sylvester CRNA  Authorized by: Kennedy Nguyễn II, MD        Preanesthetic Checklist  Completed: patient identified, IV checked, site marked, risks and benefits discussed, surgical consent, monitors and equipment checked, pre-op evaluation, timeout performed, anesthesia consent given, hand hygiene performed and patient being monitored  Preparation  Patient position: sitting  Prep: ChloraPrep  Patient monitoring: Pulse Ox and Blood Pressure  Reason for block: primary anesthetic   Epidural  Skin Anesthetic: lidocaine 1%  Skin Wheal: 3 mL  Administration type: continuous  Approach: midline  Interspace: L3-4    Injection technique: MITZY air  Needle and Epidural Catheter  Needle type: Tuohy   Needle gauge: 17  Needle length: 3.5 inches  Needle insertion depth: 7 cm  Catheter type: springwound and multi-orifice  Catheter size: 18 G  Catheter at skin depth: 14 cm  Insertion Attempts: 1  Test dose: 3 mL of lidocaine 1.5% with Epi 1-to-200,000  Additional Documentation: incremental injection, negative aspiration for heme and CSF, no paresthesia on injection, no signs/symptoms of IV or SA injection, no significant pain on injection and no significant complaints from patient  Needle localization: anatomical landmarks  Medications:  Volume per aspiration: 0 mL  Time between aspirations: 2 minutes   Assessment  Upper dermatomal levels - Left: T10  Right: T10   Dermatomal levels determined by pinch or prick  Ease of block: easy  Patient's tolerance of the procedure: comfortable  throughout block and no complaints No inadvertent dural puncture with Tuohy.  Dural puncture not performed with spinal needle

## 2024-11-27 NOTE — PROGRESS NOTES
S:Requesting epidural augmentation  O:  VS reviewed, afebrile   Vitals:    24 1230 24 1245 24 1255 24 1300   BP: 130/80 (!) 143/84  (!) 146/114   Pulse: 96  103 103   Resp:       Temp:       TempSrc:       SpO2: 100%  100% 100%       FHTs:  CAT 1 reassuring, moderate variability  UC: Q 3-4  SVE: 7//V/0 IUPC placed     A: IUP @ 40w0d ;     Patient Active Problem List   Diagnosis    Primigravida    Uterine fibroids affecting pregnancy    Situational mixed anxiety and depressive disorder    Gestational hypertension, third trimester       P: Anesthesia notified  Anticipate   Continue close monitoring of maternal/fetal status

## 2024-11-27 NOTE — SUBJECTIVE & OBJECTIVE
Interval History:  Saima is a 29 y.o.  at 40w0d. She is doing well.     Objective:     Vital Signs (Most Recent):  Temp: 98 °F (36.7 °C) (24 1900)  Pulse: 93 (24 0615)  Resp: 18 (24 0155)  BP: 115/71 (24 0615)  SpO2: 100 % (24 0615) Vital Signs (24h Range):  Temp:  [98 °F (36.7 °C)] 98 °F (36.7 °C)  Pulse:  [] 93  Resp:  [18-19] 18  SpO2:  [95 %-100 %] 100 %  BP: (102-161)/() 115/71        There is no height or weight on file to calculate BMI.    FHT: Cat 1 (reassuring)  TOCO:  Q 2-5    Cervical Exam:  2 cm @ 0530 cooks remains in     Significant Labs:  Lab Results   Component Value Date    [facility] B POS 2024    HEPBSAG Non-reactive 2024       I have personallly reviewed all pertinent lab results from the last 24 hours.    Physical Exam:   Constitutional: She is oriented to person, place, and time. She appears well-developed and well-nourished.        Pulmonary/Chest: Effort normal.        Abdominal: Soft.     Genitourinary:    Uterus normal.             Musculoskeletal: Normal range of motion and moves all extremeties.       Neurological: She is alert and oriented to person, place, and time.    Skin: Skin is warm and dry.    Psychiatric: She has a normal mood and affect. Her behavior is normal. Judgment and thought content normal.       Review of Systems

## 2024-11-27 NOTE — PROGRESS NOTES
S: Resting comfortable with BRENDA in place  O:  VS reviewed, afebrile   Vitals:    11/27/24 0230 11/27/24 0300 11/27/24 0330 11/27/24 0400   BP: 123/80 124/78 135/87 121/83   Pulse: 89 97 91 97   Resp:       Temp:       TempSrc:       SpO2: 97% 96% 96% 97%       FHTs 125 cat 1, reassuring  UC 2-5  SVE 2/60/-3  CRB placed with ease 80cc/80cc in balloons    A: IUP @ 40w0d ;     Patient Active Problem List   Diagnosis    Primigravida    Uterine fibroids affecting pregnancy    Situational mixed anxiety and depressive disorder    Gestational hypertension, third trimester       P:   Continue close monitoring of maternal/fetal status  One more dose of cytotec to be given the switch to ptiocin.

## 2024-11-27 NOTE — PROGRESS NOTES
[facility]  Obstetrics  Labor Progress Note    Patient Name: Saima Ruiz  MRN: [id number]  Admission Date: 2024  Hospital Length of Stay: 1 days  Attending Physician: Joya Calderon MD  Primary Care Provider: David Simmons MD    Subjective:     Principal Problem:Gestational hypertension, third trimester    Hospital Course:  Admit for IOL secondary to GHTN. Pre-e work up in progress. Discussed possible lengthy labor process. Will start with cytotec. Desires BRENDA. Patient agreeable to POC.   24 Cooks in, will initiate pitocin    Interval History:  Saima is a 29 y.o.  at 40w0d. She is doing well.     Objective:     Vital Signs (Most Recent):  Temp: 98 °F (36.7 °C) (24 1900)  Pulse: 93 (24 0615)  Resp: 18 (24 0155)  BP: 115/71 (24 0615)  SpO2: 100 % (24 0615) Vital Signs (24h Range):  Temp:  [98 °F (36.7 °C)] 98 °F (36.7 °C)  Pulse:  [] 93  Resp:  [18-19] 18  SpO2:  [95 %-100 %] 100 %  BP: (102-161)/() 115/71        There is no height or weight on file to calculate BMI.    FHT: Cat 1 (reassuring)  TOCO:  Q 2-5    Cervical Exam:  2 cm @ 0530 cooks remains in     Significant Labs:  Lab Results   Component Value Date    GROUPTRH B POS 2024    HEPBSAG Non-reactive 2024       I have personallly reviewed all pertinent lab results from the last 24 hours.    Physical Exam:   Constitutional: She is oriented to person, place, and time. She appears well-developed and well-nourished.        Pulmonary/Chest: Effort normal.        Abdominal: Soft.     Genitourinary:    Uterus normal.             Musculoskeletal: Normal range of motion and moves all extremeties.       Neurological: She is alert and oriented to person, place, and time.    Skin: Skin is warm and dry.    Psychiatric: She has a normal mood and affect. Her behavior is normal. Judgment and thought content normal.       Review of Systems  Assessment/Plan:     29 y.o. female   at 40w0d for:    * Gestational hypertension, third trimester  First couple of -160/80-90's. Admit for IOL secondary to GHTN. Pre-e work up in progress. Discussed possible lengthy labor process. Will start with cytotec. Desires BRENDA. Patient agreeable to POC.     Situational mixed anxiety and depressive disorder  Monitor postpartum     Uterine fibroids affecting pregnancy  Monitor for PPH          Jany Riley CNM  Obstetrics  [facility]

## 2024-11-27 NOTE — PROGRESS NOTES
S: Resting comfortable after fentanyl   O:  VS reviewed, afebrile   Vitals:    11/26/24 1909 11/26/24 1930 11/26/24 2009 11/26/24 2030   BP: (!) 140/83 134/88  (!) 145/84   Pulse: 87 89  81   Resp:   18    Temp:       TempSrc:       SpO2: 95% 97%  99%       FHTs 130 cat 1, reassuring  UC 2-6  SVE 1.5/60/-2    A: IUP @ 39w6d ;     Patient Active Problem List   Diagnosis    Primigravida    Uterine fibroids affecting pregnancy    Situational mixed anxiety and depressive disorder    Gestational hypertension, third trimester       P:   Continue close monitoring of maternal/fetal status  Continue with cytotec IOL. Discussed possible CRB in am

## 2024-11-28 PROBLEM — Z34.00 PRIMIGRAVIDA: Status: RESOLVED | Noted: 2024-04-25 | Resolved: 2024-11-28

## 2024-11-28 LAB
ALLENS TEST: ABNORMAL
GENTAMICIN SERPL-MCNC: 2 UG/ML
HCO3 UR-SCNC: 17.7 MMOL/L (ref 24–28)
PCO2 BLDA: 60.6 MMHG (ref 30–49)
PH SMN: 7.07 [PH] (ref 7.3–7.5)
PO2 BLDA: 20 MMHG (ref 40–60)
POC BE: -12 MMOL/L
POC SATURATED O2: 18 % (ref 95–97)
SAMPLE: ABNORMAL
SITE: ABNORMAL

## 2024-11-28 PROCEDURE — 80170 ASSAY OF GENTAMICIN: CPT | Performed by: OBSTETRICS & GYNECOLOGY

## 2024-11-28 PROCEDURE — 25000003 PHARM REV CODE 250: Performed by: OBSTETRICS & GYNECOLOGY

## 2024-11-28 PROCEDURE — 3E0DXGC INTRODUCTION OF OTHER THERAPEUTIC SUBSTANCE INTO MOUTH AND PHARYNX, EXTERNAL APPROACH: ICD-10-PCS | Performed by: OBSTETRICS & GYNECOLOGY

## 2024-11-28 PROCEDURE — 36415 COLL VENOUS BLD VENIPUNCTURE: CPT | Performed by: OBSTETRICS & GYNECOLOGY

## 2024-11-28 PROCEDURE — 36416 COLLJ CAPILLARY BLOOD SPEC: CPT

## 2024-11-28 PROCEDURE — 72200005 HC VAGINAL DELIVERY LEVEL II

## 2024-11-28 PROCEDURE — 25000003 PHARM REV CODE 250: Performed by: ADVANCED PRACTICE MIDWIFE

## 2024-11-28 PROCEDURE — 63600175 PHARM REV CODE 636 W HCPCS: Performed by: OBSTETRICS & GYNECOLOGY

## 2024-11-28 PROCEDURE — 59400 OBSTETRICAL CARE: CPT | Mod: ,,, | Performed by: ADVANCED PRACTICE MIDWIFE

## 2024-11-28 PROCEDURE — 3E033VJ INTRODUCTION OF OTHER HORMONE INTO PERIPHERAL VEIN, PERCUTANEOUS APPROACH: ICD-10-PCS | Performed by: OBSTETRICS & GYNECOLOGY

## 2024-11-28 PROCEDURE — 82803 BLOOD GASES ANY COMBINATION: CPT

## 2024-11-28 PROCEDURE — 63600175 PHARM REV CODE 636 W HCPCS: Performed by: ADVANCED PRACTICE MIDWIFE

## 2024-11-28 PROCEDURE — 10907ZC DRAINAGE OF AMNIOTIC FLUID, THERAPEUTIC FROM PRODUCTS OF CONCEPTION, VIA NATURAL OR ARTIFICIAL OPENING: ICD-10-PCS | Performed by: OBSTETRICS & GYNECOLOGY

## 2024-11-28 PROCEDURE — 99900035 HC TECH TIME PER 15 MIN (STAT)

## 2024-11-28 PROCEDURE — 0HQ9XZZ REPAIR PERINEUM SKIN, EXTERNAL APPROACH: ICD-10-PCS | Performed by: OBSTETRICS & GYNECOLOGY

## 2024-11-28 PROCEDURE — 11000001 HC ACUTE MED/SURG PRIVATE ROOM

## 2024-11-28 RX ORDER — HYDROCORTISONE 25 MG/G
CREAM TOPICAL 3 TIMES DAILY PRN
Status: DISCONTINUED | OUTPATIENT
Start: 2024-11-28 | End: 2024-11-29 | Stop reason: HOSPADM

## 2024-11-28 RX ORDER — ACETAMINOPHEN 325 MG/1
650 TABLET ORAL EVERY 6 HOURS SCHEDULED
Status: DISCONTINUED | OUTPATIENT
Start: 2024-11-28 | End: 2024-11-29 | Stop reason: HOSPADM

## 2024-11-28 RX ORDER — HYDROCODONE BITARTRATE AND ACETAMINOPHEN 7.5; 325 MG/1; MG/1
1 TABLET ORAL EVERY 4 HOURS PRN
Status: DISCONTINUED | OUTPATIENT
Start: 2024-11-28 | End: 2024-11-29 | Stop reason: HOSPADM

## 2024-11-28 RX ORDER — OXYTOCIN-SODIUM CHLORIDE 0.9% IV SOLN 30 UNIT/500ML 30-0.9/5 UT/ML-%
10 SOLUTION INTRAVENOUS ONCE AS NEEDED
Status: DISCONTINUED | OUTPATIENT
Start: 2024-11-28 | End: 2024-11-29 | Stop reason: HOSPADM

## 2024-11-28 RX ORDER — ONDANSETRON 8 MG/1
8 TABLET, ORALLY DISINTEGRATING ORAL EVERY 8 HOURS PRN
Status: DISCONTINUED | OUTPATIENT
Start: 2024-11-28 | End: 2024-11-29 | Stop reason: HOSPADM

## 2024-11-28 RX ORDER — HYDROCODONE BITARTRATE AND ACETAMINOPHEN 5; 325 MG/1; MG/1
1 TABLET ORAL EVERY 4 HOURS PRN
Status: DISCONTINUED | OUTPATIENT
Start: 2024-11-28 | End: 2024-11-29 | Stop reason: HOSPADM

## 2024-11-28 RX ORDER — OXYTOCIN/0.9 % SODIUM CHLORIDE 15/250 ML
95 PLASTIC BAG, INJECTION (ML) INTRAVENOUS ONCE AS NEEDED
Status: DISCONTINUED | OUTPATIENT
Start: 2024-11-28 | End: 2024-11-29 | Stop reason: HOSPADM

## 2024-11-28 RX ORDER — OXYTOCIN/0.9 % SODIUM CHLORIDE 15/250 ML
95 PLASTIC BAG, INJECTION (ML) INTRAVENOUS CONTINUOUS PRN
Status: DISCONTINUED | OUTPATIENT
Start: 2024-11-28 | End: 2024-11-29 | Stop reason: HOSPADM

## 2024-11-28 RX ORDER — IBUPROFEN 600 MG/1
600 TABLET ORAL EVERY 6 HOURS PRN
Status: DISCONTINUED | OUTPATIENT
Start: 2024-11-28 | End: 2024-11-29 | Stop reason: HOSPADM

## 2024-11-28 RX ORDER — CARBOPROST TROMETHAMINE 250 UG/ML
250 INJECTION, SOLUTION INTRAMUSCULAR
Status: DISCONTINUED | OUTPATIENT
Start: 2024-11-28 | End: 2024-11-29 | Stop reason: HOSPADM

## 2024-11-28 RX ORDER — DIPHENHYDRAMINE HYDROCHLORIDE 50 MG/ML
25 INJECTION INTRAMUSCULAR; INTRAVENOUS EVERY 4 HOURS PRN
Status: DISCONTINUED | OUTPATIENT
Start: 2024-11-28 | End: 2024-11-29 | Stop reason: HOSPADM

## 2024-11-28 RX ORDER — MISOPROSTOL 200 UG/1
800 TABLET ORAL ONCE AS NEEDED
Status: DISCONTINUED | OUTPATIENT
Start: 2024-11-28 | End: 2024-11-29 | Stop reason: HOSPADM

## 2024-11-28 RX ORDER — PRENATAL WITH FERROUS FUM AND FOLIC ACID 3080; 920; 120; 400; 22; 1.84; 3; 20; 10; 1; 12; 200; 27; 25; 2 [IU]/1; [IU]/1; MG/1; [IU]/1; MG/1; MG/1; MG/1; MG/1; MG/1; MG/1; UG/1; MG/1; MG/1; MG/1; MG/1
1 TABLET ORAL DAILY
Status: DISCONTINUED | OUTPATIENT
Start: 2024-11-28 | End: 2024-11-29 | Stop reason: HOSPADM

## 2024-11-28 RX ORDER — OXYTOCIN 10 [USP'U]/ML
10 INJECTION, SOLUTION INTRAMUSCULAR; INTRAVENOUS ONCE AS NEEDED
Status: COMPLETED | OUTPATIENT
Start: 2024-11-28 | End: 2024-11-28

## 2024-11-28 RX ORDER — DIPHENHYDRAMINE HCL 25 MG
25 CAPSULE ORAL EVERY 4 HOURS PRN
Status: DISCONTINUED | OUTPATIENT
Start: 2024-11-28 | End: 2024-11-29 | Stop reason: HOSPADM

## 2024-11-28 RX ORDER — SIMETHICONE 80 MG
1 TABLET,CHEWABLE ORAL EVERY 6 HOURS PRN
Status: DISCONTINUED | OUTPATIENT
Start: 2024-11-28 | End: 2024-11-29 | Stop reason: HOSPADM

## 2024-11-28 RX ORDER — TRANEXAMIC ACID 10 MG/ML
1000 INJECTION, SOLUTION INTRAVENOUS EVERY 30 MIN PRN
Status: DISCONTINUED | OUTPATIENT
Start: 2024-11-28 | End: 2024-11-29 | Stop reason: HOSPADM

## 2024-11-28 RX ORDER — DIPHENOXYLATE HYDROCHLORIDE AND ATROPINE SULFATE 2.5; .025 MG/1; MG/1
2 TABLET ORAL EVERY 6 HOURS PRN
Status: DISCONTINUED | OUTPATIENT
Start: 2024-11-28 | End: 2024-11-29 | Stop reason: HOSPADM

## 2024-11-28 RX ORDER — METHYLERGONOVINE MALEATE 0.2 MG/ML
200 INJECTION INTRAVENOUS ONCE AS NEEDED
Status: DISCONTINUED | OUTPATIENT
Start: 2024-11-28 | End: 2024-11-29 | Stop reason: HOSPADM

## 2024-11-28 RX ORDER — SODIUM CHLORIDE 0.9 % (FLUSH) 0.9 %
10 SYRINGE (ML) INJECTION
Status: DISCONTINUED | OUTPATIENT
Start: 2024-11-28 | End: 2024-11-29 | Stop reason: HOSPADM

## 2024-11-28 RX ORDER — DOCUSATE SODIUM 100 MG/1
200 CAPSULE, LIQUID FILLED ORAL 2 TIMES DAILY PRN
Status: DISCONTINUED | OUTPATIENT
Start: 2024-11-28 | End: 2024-11-29 | Stop reason: HOSPADM

## 2024-11-28 RX ADMIN — PRENATAL VITAMINS-IRON FUMARATE 27 MG IRON-FOLIC ACID 0.8 MG TABLET 1 TABLET: at 09:11

## 2024-11-28 RX ADMIN — AMPICILLIN 2 G: 2 INJECTION, POWDER, FOR SOLUTION INTRAMUSCULAR; INTRAVENOUS at 09:11

## 2024-11-28 RX ADMIN — IBUPROFEN 600 MG: 600 TABLET, FILM COATED ORAL at 01:11

## 2024-11-28 RX ADMIN — OXYTOCIN 10 UNITS: 10 INJECTION, SOLUTION INTRAMUSCULAR; INTRAVENOUS at 12:11

## 2024-11-28 RX ADMIN — ACETAMINOPHEN 650 MG: 325 TABLET ORAL at 07:11

## 2024-11-28 RX ADMIN — IBUPROFEN 600 MG: 600 TABLET, FILM COATED ORAL at 02:11

## 2024-11-28 RX ADMIN — HYDROCODONE BITARTRATE AND ACETAMINOPHEN 1 TABLET: 7.5; 325 TABLET ORAL at 03:11

## 2024-11-28 RX ADMIN — AMPICILLIN 2 G: 2 INJECTION, POWDER, FOR SOLUTION INTRAMUSCULAR; INTRAVENOUS at 03:11

## 2024-11-28 RX ADMIN — ACETAMINOPHEN 650 MG: 325 TABLET ORAL at 01:11

## 2024-11-28 RX ADMIN — HYDROCODONE BITARTRATE AND ACETAMINOPHEN 1 TABLET: 5; 325 TABLET ORAL at 02:11

## 2024-11-28 RX ADMIN — GENTAMICIN SULFATE 431.2 MG: 40 INJECTION, SOLUTION INTRAMUSCULAR; INTRAVENOUS at 02:11

## 2024-11-28 NOTE — PLAN OF CARE
Mother progressing well. She took a shower today. Her pain is controlled by PO meds. She applied an ice pack to her buttock and vaginal area  and says this helped. She is urinating without difficulty. She has been afebrile this shift.

## 2024-11-28 NOTE — PLAN OF CARE
Problem: Adult Inpatient Plan of Care  Goal: Plan of Care Review  11/27/2024 1946 by Carlee Mills RN  Outcome: Progressing  11/27/2024 1946 by Carlee Mills RN  Outcome: Progressing  Goal: Patient-Specific Goal (Individualized)  11/27/2024 1946 by Carlee Mills RN  Outcome: Progressing  11/27/2024 1946 by Carlee Mills RN  Outcome: Progressing  Goal: Absence of Hospital-Acquired Illness or Injury  11/27/2024 1946 by Carlee Mills RN  Outcome: Progressing  11/27/2024 1946 by Carlee Mills RN  Outcome: Progressing  Goal: Optimal Comfort and Wellbeing  11/27/2024 1946 by Carlee Mills RN  Outcome: Progressing  11/27/2024 1946 by Carlee Mills RN  Outcome: Progressing  Goal: Readiness for Transition of Care  11/27/2024 1946 by Carlee Mills RN  Outcome: Progressing  11/27/2024 1946 by Carlee Mills RN  Outcome: Progressing      Not likely

## 2024-11-28 NOTE — L&D DELIVERY NOTE
"[facility]  Vaginal Delivery   Operative Note    SUMMARY     Normal spontaneous vaginal delivery of live infant, skin to skin unable to be preformed due to thick meconium requiring deep suctioning. Infant passed off directly to NICU staff.   Infant delivered position MAG over intact perineum.  Nuchal cord: Yes, cord reduced following delivery.    Spontaneous delivery of placenta and IM pitocin given noting good uterine tone.  1st degree laceration noted hymnal ring stitch x's 3 placed with 2.0 vicryl  Patient tolerated delivery well. Sponge needle and lap counted correctly x2.    Indications: Gestational hypertension, third trimester  Pregnancy complicated by:   Patient Active Problem List   Diagnosis    Uterine fibroids affecting pregnancy    Situational mixed anxiety and depressive disorder    Gestational hypertension, third trimester    Normal  (single liveborn)    Normal vaginal delivery    Obstetrical laceration, first degree     Admitting GA: 40w1d    Delivery Information for Trung Ruiz    Birth information:  YOB: 2022   Time of birth: 12:12 AM   Sex: male   Head Delivery Date/Time:     Delivery type:    Gestational Age: 40w1d       Delivery Providers    Delivering clinician:            Measurements    Weight: 2830 g  Weight (lbs): 6 lb 3.8 oz  Length: 48 cm  Length (in): 18.9"  Head circumference: 35 cm  Chest circumference: 30.5 cm  Abdominal girth: 27.5 cm         Apgars    Living status: Living  Apgar Component Scores:  1 min.:  5 min.:  10 min.:  15 min.:  20 min.:    Skin color:  0  1       Heart rate:  2  2       Reflex irritability:  2  2       Muscle tone:  2  2       Respiratory effort:  1  2       Total:  7  9       Apgars assigned by: ARGENIS FITCH                         Interventions/Resuscitation           Cord    No data filed       Placenta    Placenta delivery date/time:   Placenta removal:            Labor Events:       labor:       Labor " Onset Date/Time:         Dilation Complete Date/Time:         Start Pushing Date/Time:         Start Pushing Date/Time:       Rupture Date/Time: 24 1255        Rupture type: ARM (Artificial Rupture)        Fluid Amount:       Fluid Color: Meconium Moderate              steroids:       Antibiotics given for GBS:       Induction:       Indications for induction:        Augmentation:       Indications for augmentation:       Labor complications:       Additional complications:          Cervical ripening:                     Delivery:      Episiotomy:       Indication for Episiotomy:       Perineal Lacerations:   Repaired:      Periurethral Laceration:   Repaired:     Labial Laceration:   Repaired:     Sulcus Laceration:   Repaired:     Vaginal Laceration:   Repaired:     Cervical Laceration:   Repaired:     Repair suture:       Repair # of packets:       Last Value - EBL - Nursing (mL):       Sum - EBL - Nursing (mL): 0     Last Value - EBL - Anesthesia (mL):      Calculated QBL (mL):       Running total QBL (mL):       Vaginal Sweep Performed:       Surgicount Correct:       Vaginal Packing:   Quantity:       Other providers:            Details (if applicable):  Trial of Labor      Categorization:      Priority:     Indications for :     Incision Type:       Additional  information:  Forceps:    Vacuum:    Breech:    Observed anomalies    Other (Comments):

## 2024-11-28 NOTE — CONSULTS
Pharmacokinetic Initial Assessment: Gentamicin    Assessment:  Weight utilized for dose calculation: Actual Body Weight  Dosing method utilized: extended interval dosing    Plan: Extended interval dosing regimen: Gentamicin 431.2 mg IV once, followed by a random level to be drawn on 11/28 at 1030, 8-12 hours after the first dose.    Pharmacy will continue to monitor.    Please contact pharmacy at extension [phone] with any questions regarding this assessment.    Thank you for the consult,    Katherine Jaimes, PharmD 11/28/2024 2:55 AM         Patient brief summary:  Saima Ruiz is a 29 y.o. female initiated on aminoglycoside therapy for treatment of suspected  chorioamionitis    Drug Allergies:   Review of patient's allergies indicates:  No Known Allergies    Actual Body Weight:   86.2 kg    Adjust Body Weight:   61.8 kg    Ideal Body Weight:  45.5 kg    Renal Function:   Estimated Creatinine Clearance: 103.1 mL/min (based on SCr of 0.8 mg/dL).,     Dialysis Method (if applicable):  N/A    CBC (last 72 hours):  Recent Labs   Lab Result Units 11/26/24  1336   WBC K/uL 11.46   Hemoglobin g/dL 13.6   Hematocrit % 41.4   Platelets K/uL 194   Gran % % 71.2   Lymph % % 21.6   Mono % % 5.6   Eosinophil % % 0.4   Basophil % % 0.4   Differential Method  Automated       Metabolic Panel (last 72 hours):  Recent Labs   Lab Result Units 11/26/24  1311 11/26/24  1336   Sodium mmol/L  --  137   Potassium mmol/L  --  3.7   Chloride mmol/L  --  109   CO2 mmol/L  --  14*   Glucose mg/dL  --  119*   BUN mg/dL  --  5*   Creatinine mg/dL  --  0.8   Creatinine, Urine mg/dL 162.3  --    Albumin g/dL  --  3.0*   Total Bilirubin mg/dL  --  0.2   Alkaline Phosphatase U/L  --  294*   AST U/L  --  16   ALT U/L  --  16       Microbiologic Results:  Microbiology Results (last 7 days)       ** No results found for the last 168 hours. **

## 2024-11-28 NOTE — PROGRESS NOTES
S: C/O pressure  O:  VS reviewed, afebrile   Vitals:    11/27/24 1830 11/27/24 1843 11/27/24 1845 11/27/24 1903   BP: 121/75 119/65 119/65 130/81   Pulse: 88 94 94 90   Resp:       Temp:       TempSrc:       SpO2: 100% 99% 99%        FHTs: CAT 2, periods of moderate variability, intermittent variables  UC: Q 2  SVE 8-9/V/0    A: IUP @ 40w0d ;     Patient Active Problem List   Diagnosis    Primigravida    Uterine fibroids affecting pregnancy    Situational mixed anxiety and depressive disorder    Gestational hypertension, third trimester       P: CPM  Continue close monitoring of maternal/fetal status

## 2024-11-28 NOTE — LACTATION NOTE
Lactation rounds.    Reviewed what to expect in the early  period, infant feeding/hunger cues, cue based feeding on demand, proper positioning and latch technique, nutritive versus non-nutritive suckling, listening for audible swallows, expected output, uninterrupted skin to skin contact, and unrestricted access to breast. Encouraged to avoid pacifier for now, and reviewed risks. Encouraged to feed on demand 8 or more times per day and to request assistance as needed.     Baby swaddled in father's arms upon entry into room. Began to demonstrate hunger cues through discussion. Assisted with positioning and latch technique. Baby latched to right breast in FB hold with nutritive suckling and audible swallows observed. With breast compressions, increase in audible swallowing noted.     Encouraged both parents to call for assistance as needed throughout their stay. Parents deny further questions/concerns at this time and verbalize understanding and appreciation.

## 2024-11-28 NOTE — ANESTHESIA POSTPROCEDURE EVALUATION
Anesthesia Post Evaluation    Patient: Saima Ruiz    Procedure(s) Performed: * No procedures listed *    Final Anesthesia Type: epidural      Patient location during evaluation: labor & delivery  Patient participation: Yes- Able to Participate  Level of consciousness: awake and alert and oriented  Post-procedure vital signs: reviewed and stable  Pain management: adequate  Airway patency: patent    PONV status at discharge: No PONV  Anesthetic complications: no      Cardiovascular status: blood pressure returned to baseline, stable and hemodynamically stable  Respiratory status: unassisted, room air and spontaneous ventilation  Hydration status: euvolemic  Follow-up not needed.              Vitals Value Taken Time   /93 11/28/24 0222   Temp 37.3 °C (99.1 °F) 11/27/24 2300   Pulse 120 11/28/24 0222   Resp 18 11/27/24 1903   SpO2 100 % 11/28/24 0021   Vitals shown include unfiled device data.      No case tracking events are documented in the log.      Pain/Luis Score: Pain Rating Prior to Med Admin: 6 (11/28/2024  1:10 AM)  Pain Rating Post Med Admin: 0 (11/27/2024  2:28 AM)

## 2024-11-29 VITALS
DIASTOLIC BLOOD PRESSURE: 88 MMHG | OXYGEN SATURATION: 98 % | HEART RATE: 95 BPM | BODY MASS INDEX: 37.11 KG/M2 | TEMPERATURE: 99 F | RESPIRATION RATE: 12 BRPM | SYSTOLIC BLOOD PRESSURE: 134 MMHG | WEIGHT: 190 LBS

## 2024-11-29 LAB — GENTAMICIN TROUGH SERPL-MCNC: <0.5 UG/ML (ref 0–2)

## 2024-11-29 PROCEDURE — 80170 ASSAY OF GENTAMICIN: CPT | Performed by: OBSTETRICS & GYNECOLOGY

## 2024-11-29 PROCEDURE — 25000003 PHARM REV CODE 250: Performed by: ADVANCED PRACTICE MIDWIFE

## 2024-11-29 PROCEDURE — 63600175 PHARM REV CODE 636 W HCPCS: Performed by: OBSTETRICS & GYNECOLOGY

## 2024-11-29 PROCEDURE — 36415 COLL VENOUS BLD VENIPUNCTURE: CPT | Performed by: OBSTETRICS & GYNECOLOGY

## 2024-11-29 PROCEDURE — 25000003 PHARM REV CODE 250: Performed by: OBSTETRICS & GYNECOLOGY

## 2024-11-29 RX ORDER — IBUPROFEN 600 MG/1
600 TABLET ORAL EVERY 6 HOURS PRN
Qty: 90 TABLET | Refills: 0 | Status: SHIPPED | OUTPATIENT
Start: 2024-11-29

## 2024-11-29 RX ADMIN — ACETAMINOPHEN 650 MG: 325 TABLET ORAL at 06:11

## 2024-11-29 RX ADMIN — GENTAMICIN SULFATE 432 MG: 40 INJECTION, SOLUTION INTRAMUSCULAR; INTRAVENOUS at 02:11

## 2024-11-29 RX ADMIN — PRENATAL VITAMINS-IRON FUMARATE 27 MG IRON-FOLIC ACID 0.8 MG TABLET 1 TABLET: at 08:11

## 2024-11-29 RX ADMIN — ACETAMINOPHEN 650 MG: 325 TABLET ORAL at 12:11

## 2024-11-29 NOTE — PROGRESS NOTES
AMINOGLYCOSIDE DOSING BY PHARMACY DISCONTINUATION NOTE    Saima Ruiz is a 29 y.o. female who had been consulted for gentamicin dosing.    The pharmacy consult for gentamicin dosing has been discontinued.     Aminoglycoside Dosing by Pharmacy Consult will sign-off. Please reconsult if necessary. Thank you for allowing us to participate in this patient's care.     Marco Lozoya, MargaretD  Ext: [phone]

## 2024-11-29 NOTE — PLAN OF CARE
[facility])  Discharge Assessment    Primary Care Provider: David Simmons MD     OB Screen (most recent)       OB Screen - 24 8326          OB SCREEN    Assessment Type Discharge Planning Assessment     Source of Information health record     Received Prenatal Care Yes     Is this a teen pregnancy No     Is the baby in NICU No     Indication for adoption/[facility] No     Indication for DME/post-acute needs No     HIV (+) No     Any congenital  disorders No     Fetal demise/ death No                     Patient has no d/c needs at this time. Sw to follow up, as needed, for d/c planning purposes.

## 2024-11-29 NOTE — LACTATION NOTE
This note was copied from a baby's chart.  Lactation Rounds: Infant asleep at this time. Mother verbalizes no concerns at this time. Lactation availability discussed with mother. Instructed to call out for assistance as needed.

## 2024-11-29 NOTE — SUBJECTIVE & OBJECTIVE
"Interval History: PPD 1.5    She is doing well this morning. She is tolerating a regular diet without nausea or vomiting. She is voiding spontaneously. She is ambulating. She has not passed flatus, and has a BM. Vaginal bleeding is mild. She denies fever or chills. Abdominal pain is mild and controlled with oral medications. She Is breastfeeding. She desires circumcision for her male baby: yes.    Objective:     Vital Signs (Most Recent):  Temp: 98.7 °F (37.1 °C) (11/29/24 0807)  Pulse: 92 (11/29/24 0807)  Resp: 18 (11/29/24 0807)  BP: 118/68 (11/29/24 0807)  SpO2: 98 % (11/29/24 0807) Vital Signs (24h Range):  Temp:  [98.5 °F (36.9 °C)-98.7 °F (37.1 °C)] 98.7 °F (37.1 °C)  Pulse:  [] 92  Resp:  [16-18] 18  SpO2:  [92 %-98 %] 98 %  BP: (100-133)/(58-76) 118/68     Weight: 86.2 kg (190 lb)  Body mass index is 37.11 kg/m².      Intake/Output Summary (Last 24 hours) at 11/29/2024 0859  Last data filed at 11/28/2024 0900  Gross per 24 hour   Intake --   Output 650 ml   Net -650 ml         Significant Labs:  Lab Results   Component Value Date    GROUPTRH B POS 11/26/2024    HEPBSAG Non-reactive 04/01/2024     No results for input(s): "HGB", "HCT" in the last 48 hours.    I have personallly reviewed all pertinent lab results from the last 24 hours.  Recent Lab Results         11/29/24  0137   11/28/24  1144        Gentamicin, Random   2.0  Comment: Therapeutic range is not established for random specimens.       Gentamicin, Trough <0.5                 Physical Exam:   Constitutional: She is oriented to person, place, and time. She appears well-developed and well-nourished.    HENT:   Head: Normocephalic and atraumatic.    Eyes: EOM are normal.     Cardiovascular:  Normal rate.             Pulmonary/Chest: Effort normal.        Abdominal: Soft.     Genitourinary: There is vaginal discharge in the vagina.           Musculoskeletal: Normal range of motion and moves all extremeties.       Neurological: She is alert and " oriented to person, place, and time. She has normal reflexes.    Skin: Skin is warm and dry.    Psychiatric: She has a normal mood and affect. Her behavior is normal. Judgment and thought content normal.       Review of Systems   Eyes:  Negative for visual disturbance.   Gastrointestinal:  Positive for abdominal pain.   Genitourinary:  Positive for vaginal bleeding.   Neurological:  Negative for headaches.   All other systems reviewed and are negative.

## 2024-11-29 NOTE — PLAN OF CARE
AVS sheet reviewed. Educated on self care, follow-up appointments, and signs/symptoms of pre-eclampsia. Patient verbalizes understanding.

## 2024-11-29 NOTE — PROGRESS NOTES
"[facility] ([facility])  Obstetrics  Postpartum Progress Note    Patient Name: Saima Ruiz  MRN: [id number]  Admission Date: 11/26/2024  Hospital Length of Stay: 3 days  Attending Physician: Joya Calderon MD  Primary Care Provider: David Simmons MD    Subjective:     Principal Problem:Normal vaginal delivery    Hospital Course:  Admit for IOL secondary to GHTN. Pre-e work up in progress. Discussed possible lengthy labor process. Will start with cytotec. Desires RBENDA. Patient agreeable to POC.   11/27/24 Cooks in, will initiate pitocin    PPD1.5: Routine PP care. Bps controlled. Afebrile 24+ hours. Pre E and DC instructions reviewed.     Interval History: PPD 1.5    She is doing well this morning. She is tolerating a regular diet without nausea or vomiting. She is voiding spontaneously. She is ambulating. She has not passed flatus, and has a BM. Vaginal bleeding is mild. She denies fever or chills. Abdominal pain is mild and controlled with oral medications. She Is breastfeeding. She desires circumcision for her male baby: yes.    Objective:     Vital Signs (Most Recent):  Temp: 98.7 °F (37.1 °C) (11/29/24 0807)  Pulse: 92 (11/29/24 0807)  Resp: 18 (11/29/24 0807)  BP: 118/68 (11/29/24 0807)  SpO2: 98 % (11/29/24 0807) Vital Signs (24h Range):  Temp:  [98.5 °F (36.9 °C)-98.7 °F (37.1 °C)] 98.7 °F (37.1 °C)  Pulse:  [] 92  Resp:  [16-18] 18  SpO2:  [92 %-98 %] 98 %  BP: (100-133)/(58-76) 118/68     Weight: 86.2 kg (190 lb)  Body mass index is 37.11 kg/m².      Intake/Output Summary (Last 24 hours) at 11/29/2024 0859  Last data filed at 11/28/2024 0900  Gross per 24 hour   Intake --   Output 650 ml   Net -650 ml         Significant Labs:  Lab Results   Component Value Date    GROUPTRH B POS 11/26/2024    HEPBSAG Non-reactive 04/01/2024     No results for input(s): "HGB", "HCT" in the last 48 hours.    I have personallly reviewed all pertinent lab results from the last 24 hours.  Recent Lab " Results         24  0137   24  1144        Gentamicin, Random   2.0  Comment: Therapeutic range is not established for random specimens.       Gentamicin, Trough <0.5                 Physical Exam:   Constitutional: She is oriented to person, place, and time. She appears well-developed and well-nourished.    HENT:   Head: Normocephalic and atraumatic.    Eyes: EOM are normal.     Cardiovascular:  Normal rate.             Pulmonary/Chest: Effort normal.        Abdominal: Soft.     Genitourinary: There is vaginal discharge in the vagina.           Musculoskeletal: Normal range of motion and moves all extremeties.       Neurological: She is alert and oriented to person, place, and time. She has normal reflexes.    Skin: Skin is warm and dry.    Psychiatric: She has a normal mood and affect. Her behavior is normal. Judgment and thought content normal.       Review of Systems   Eyes:  Negative for visual disturbance.   Gastrointestinal:  Positive for abdominal pain.   Genitourinary:  Positive for vaginal bleeding.   Neurological:  Negative for headaches.   All other systems reviewed and are negative.    Assessment/Plan:     29 y.o. female  for:    * Normal vaginal delivery  Routine PP care    Obstetrical laceration, first degree  Routine [facility]    Normal  (single liveborn)  Care of infant under peds      Gestational hypertension, third trimester   Pre-e work up WNL. Bps controlled 3 day BP check PP    Situational mixed anxiety and depressive disorder  Monitor postpartum     Uterine fibroids affecting pregnancy  Monitor for PPH        Disposition: As patient meets milestones, will plan to discharge today.    Glory Rivero CNM  Obstetrics  [facility] ([facility])

## 2024-11-29 NOTE — PROGRESS NOTES
OBGYN Post-op Clinic  History and Physical    Patient Name: Saima Ruiz  YOB: 1995 (29 y.o.)  MRN: [id number]  Today's Date: 2024    Referring Md:   No referring provider defined for this encounter.    SUBJECTIVE:     Chief Complaint: BP Check    History of Present Illness:  Saima Ruiz is a 29 y.o. female  who presents to the clinic today for BP check. S/p delivery on 24. Seen in triage in L&D last night ()and started on Procardia 30mg daily. Still having a mild headache but nothing severe. Legs are swollen but improving. Denies other pre-e signs or symptoms and is feeling well.       Review of patient's allergies indicates:  No Known Allergies    Past Medical History:   Diagnosis Date    Anxiety disorder, unspecified     Insomnia     Migraines      Past Surgical History:   Procedure Laterality Date    EYE SURGERY      PRK    wrist cyst removal Right      Family History   Problem Relation Name Age of Onset    Breast cancer Maternal Grandmother  66    Cancer Maternal Grandfather      Leukemia Maternal Grandfather      Colon cancer Neg Hx      Ovarian cancer Neg Hx       Social History     Tobacco Use    Smoking status: Never     Passive exposure: Never    Smokeless tobacco: Never   Substance Use Topics    Alcohol use: Not Currently     Comment: occ    Drug use: Never        Review of Systems:  Review of Systems   Constitutional:  Negative for chills and fever.   Eyes:  Negative for blurred vision and double vision.   Respiratory:  Negative for cough and shortness of breath.    Cardiovascular:  Positive for leg swelling. Negative for chest pain.   Gastrointestinal:  Negative for abdominal pain, constipation, diarrhea, nausea and vomiting.   Genitourinary:  Negative for dysuria.   Skin:  Negative for rash.   Neurological:  Positive for headaches. Negative for weakness.       OBJECTIVE:     Vital Signs (Most Recent)  LMP 2024 (Exact Date)     Physical  Exam  Vitals reviewed.   Constitutional:       General: She is not in acute distress.     Appearance: Normal appearance. She is not ill-appearing or toxic-appearing.   HENT:      Head: Normocephalic and atraumatic.   Eyes:      Extraocular Movements: Extraocular movements intact.      Conjunctiva/sclera: Conjunctivae normal.   Pulmonary:      Effort: Pulmonary effort is normal. No respiratory distress.   Musculoskeletal:         General: Swelling (BLE +1) present. Normal range of motion.      Cervical back: Normal range of motion.   Skin:     General: Skin is warm and dry.   Neurological:      General: No focal deficit present.      Mental Status: She is alert and oriented to person, place, and time.   Psychiatric:         Mood and Affect: Mood normal.         Behavior: Behavior normal.         Thought Content: Thought content normal.         Judgment: Judgment normal.             ASSESSMENT/PLAN:     Saima uRiz is a 29 y.o. female was seen today for blood pressure check. BP looks great.   Reviewed pre-e signs and symptoms including severe or persistent headache, dark spots in vision, chest tightness, nausea, and unexpected swelling. Advised patient to present to the hospital if these symptoms arise, particularly in the setting of elevated BP. Instructed patient to present to the hospital if BP >150/100 regardless of symptoms. Patient verbalized understanding.     Diagnoses and all orders for this visit:    Normal vaginal delivery    Gestational hypertension, third trimester    - Continue Procardia 30mg daily  - F/u as scheduled or sooner PRN        April [facility], PAKristenC  OBGYN - Surgery  [facility]

## 2024-11-29 NOTE — LACTATION NOTE
This note was copied from a baby's chart.  Baby is showing feeding cues. Helped mother to settle in a football position on the left breast. Reviewed deep asymmetric latch and proper positioning. Initially mother latched infant to the breast shallow. Infant's lips curled in and non nutritive suck pattern noted. Nurse demonstrated deeper latch. Mother is able to demonstrate back and deep latch easily obtained. Few swallows noted with uncoordinated suck pattern noted. Infant stimulated and nutritive suck pattern achieved but not maintained without stimulation. More swallows noted with breast compression and stimulation of infant. Mother denies pain or discomfort. Infant falls asleep at the breast in cross cradle position on the right breast. Nipple shape slightly compressed and color is WDL upon unlatching.     Mother verbalizes understanding of all education and counseling. Mother denies any further lactation needs or concerns at this time. Discussed lactation availability. Encouraged mother to call for assistance when needs arise.

## 2024-11-29 NOTE — PROGRESS NOTES
Pharmacokinetic Follow Up: Gentamicin    Assessment of levels:   Gentamicin trough level = < 0.5 mcg/ml, within goal range of < 1 mcg/ml.    Regimen Plan:   Continue gentamicin 431.2 mg IV every 24 hours and obtain a gentamicin trough level at 0130 on 12/2/24.    Drug levels (last 3 results):      Recent Labs   Lab Result Units 11/28/24  1144 11/29/24  0137   Gentamicin, Random ug/mL 2.0  --    Gentamicin, Trough ug/mL  --  <0.5       Pharmacy will continue to monitor.    Please contact pharmacy at extension [phone] with any questions regarding this assessment.    Thank you for the consult,   Marco Hery      Patient brief summary:  Saima Ruiz is a 29 y.o. female initiated on aminoglycoside therapy for treatment of chorioamnionitis.    Drug Allergies:   Review of patient's allergies indicates:  No Known Allergies    Actual Body Weight:   86.2 kg    Adjust Body Weight:   61.8 kg    Ideal Body Weight:  45.5 kg    Renal Function:   Estimated Creatinine Clearance: 103.1 mL/min (based on SCr of 0.8 mg/dL).,     Dialysis Method (if applicable):  N/A    CBC (last 72 hours):  Recent Labs   Lab Result Units 11/26/24  1336   WBC K/uL 11.46   Hemoglobin g/dL 13.6   Hematocrit % 41.4   Platelets K/uL 194   Gran % % 71.2   Lymph % % 21.6   Mono % % 5.6   Eosinophil % % 0.4   Basophil % % 0.4   Differential Method  Automated       Metabolic Panel (last 72 hours):  Recent Labs   Lab Result Units 11/26/24  1311 11/26/24  1336   Sodium mmol/L  --  137   Potassium mmol/L  --  3.7   Chloride mmol/L  --  109   CO2 mmol/L  --  14*   Glucose mg/dL  --  119*   BUN mg/dL  --  5*   Creatinine mg/dL  --  0.8   Creatinine, Urine mg/dL 162.3  --    Albumin g/dL  --  3.0*   Total Bilirubin mg/dL  --  0.2   Alkaline Phosphatase U/L  --  294*   AST U/L  --  16   ALT U/L  --  16       Aminoglycoside Administrations:  aminoglycosides given in last 96 hours                     gentamicin (GARAMYCIN) 431.2 mg in 0.9% NaCl 100 mL IVPB  (mg) 431.2 mg New Bag 11/28/24 0493

## 2024-11-29 NOTE — DISCHARGE INSTRUCTIONS
"Mother Self Care:    Activity: Avoid strenuous exercise and get adequate rest.  No driving until the physician consent given.  Emotional Changes: Most women find birth to be a time of great emotional upheaval.  Sense of loss, mood swings, fatigue, anxiety, and feeling "let down" are common.  If feelings worsen or last more than a week, call your physician.  Breast Care/Breastfeeding: Wear a bra for comfort.  Keep nipples dry and apply your own breast milk or lanolin cream as needed for soreness.  Engorgement can be relieved with warm, moist heat before feedings.  You may also take Ibuprofen.  Breast Care/Bottle Feeding: Wear support bra 24 hours a day for one week.  Avoid stimulation to breasts.  You may use ice packs for discomfort.  Mira-Care/Vaginal Bleeding: Remember to use your mira-bottle after urinating.  Your flow will change from red, to pink, to yellow/white color over a period of 2 weeks.  Menstruation will return in 3-8 weeks, or longer if breastfeeding.  Vaginal Delivery: Stitches will dissolve within 10 days to 3 weeks.  Warm baths, tucks, and dermoplast will promote healing.  Avoid bubble baths or strong soaps.     Sexual Activity/Pelvic Rest: No sexual activity, tampons, or douching until your physician gives you consent.  Diet: Continue to eat from the five basic food groups, including plenty of protein, fruits, vegetables, and whole grains.  Limit empty calories and high fat foods.  Drink enough fluids to satisfy thirst and add an extra 500 calories for breastfeeding.  Constipation/Hemorrhoids: Drink plenty of water.  You may take a stool softener or natural laxative (Metamucil). You may use tucks or hemorrhoid ointment and soak in a warm tub.    "What does help look like to you when you go home?"  "Is there any need that you anticipate that we may be able to assist with?"    CALL YOUR OB DOCTOR IF ANY OF THE FOLLOWING OCCURS:  *Heavy bleeding - saturating a pad an hour or passing any large (2-3 " inches in size) blood clots.  *Any pain, redness, or tenderness in lower leg.  *You cannot care for yourself or your baby.  *Any signs of infection-      - Temperature greater than 100.5 degrees F      - Foul smelling vaginal discharge       - Increased pain      - Hot, hard, red or sore area on breast      - Flu-like symptoms      - Any urgency, frequency or burning with urination    Return To the Hospital for further Evaluation:  Headache not relieved by tylenol or ibuprofen  Blurry vision, double vision, seeing spots, or flashing lights  Feeling faint or passing out  Right epigastric pain  Difficulty breathing  Swelling in hands, face, or feet  Any of these symptoms accompanied by nausea/vomiting  Gaining more than 5 pounds in one week  Seizures  These symptoms could be an indication of elevated blood pressure.     For patients that were treated for high blood pressure during pregnancy and or your hospital stay you will need a blood pressure check three days after you leave the hospital. Your nursing staff will assist you in an appointment if needed. If you have Connected Mom you may use your blood pressure cuff and report any readings 140/90 to your provider immediately.       If you have any questions that need to be answered immediately please call the Labor & Delivery Unit at [phone] and ask to speak to a nurse.      Please see [facility] BLUE folder for additional information and handouts.

## 2024-11-29 NOTE — LACTATION NOTE
Mother called for assistance requesting a breast pump. Mother states that she began supplementing infant with a bottle of formula after nursing sessions, because infant was still hungry after nursing. Mother reports that latching is getting better and infant latches well now, but after feeding on both breasts infant is still rooting.    Because baby is being supplemented away from the breast, mother was:   - informed that breastfeeding support and assistance is available as needed  - encouraged to express milk from both breasts each time a supplement is given  - encouraged to use her own collected milk as a first choice for supplementation  Mother was encouraged to request assistance as needed and voices understanding.     Expected oral intake per feeding (according to American Academy [employer] Breastfeeding Medicine) & expected output for each day of life was discussed:  Day 2: 5-15 mL per feeding, 2 voids, 2 stools  Day 3: 15-30 mL per feeding, 3 voids, 3 stools  Day 4: 30-60 mL per feeding, 4 voids, 3 stools     [employer] Symphony breast pump set up at bedside.  Instructed on proper usage and to adjust suction according to comfort level. Verified appropriate flange fit- 24 mm bilaterally. Reviewed frequency and duration of pumping in order to promote and maintain full milk supply. Hands-on pumping technique reviewed. Encouraged hand expression after. Instructed on proper cleaning of breast pump parts. Reviewed proper milk handling, collection, storage, and transportation. Voices understanding.     Mother anticipates discharge home today. Reviewed signs of good attachment. Reviewed breast massage and compression during feedings and indications for use. Reviewed signs of effective milk transfer and instructed to call pediatrician and lactation if signs not present. Discussed expected feeding and output pattern for days of life 2, 3, 4, & 5+; mother instructed to call pediatrician and lactation if infant is not meeting  feeding and output goals.     Reviewed signs of engorgement and expectant management. Reviewed signs of mastitis and instructed mother to call OB provider and lactation if any signs present. Discussed proper use of First Alert Form. Reviewed proper milk handling, collection and storage guidelines. Reviewed nursing diet and nutrition. Discussed resources for medication safety while breastfeeding. Reviewed available outpatient lactation resources.     Mother verbalizes understanding of all education and counseling; she denies any further lactation needs or concerns at this time. Encouraged mother to contact lactation with any questions, concerns, or problems, contact number provided.

## 2024-11-29 NOTE — DISCHARGE SUMMARY
"[facility] ([facility])  Obstetrics  Discharge Summary      Patient Name: Saima Ruiz  MRN: [id number]  Admission Date: 2024  Hospital Length of Stay: 3 days  Discharge Date and Time:  today  Attending Physician: oJya Calderon MD   Discharging Provider: Glory Rivero CNM   Primary Care Provider: David Simmons MD    HPI: 29 y.o.  at 39w6d presents to L&D with complaints of elevated BP at home (150s/90s) with a worsening headache. She denies any other s/s of pre-e, however she states that she just feels "uneasy" about everything. She has had good FM and denies vaginal bleeding, however she is starting to contract more.         * No surgery found *     Hospital Course:   Admit for IOL secondary to GHTN. Pre-e work up in progress. Discussed possible lengthy labor process. Will start with cytotec. Desires BRENDA. Patient agreeable to POC.   24 Cooks in, will initiate pitocin    PPD1.5: Routine PP care. Bps controlled. Afebrile 24+ hours. Pre E and DC instructions reviewed.          Final Active Diagnoses:    Diagnosis Date Noted POA    PRINCIPAL PROBLEM:  Normal vaginal delivery [O80] 2024 Not Applicable    Normal  (single liveborn) [Z38.2] 2024 No    Obstetrical laceration, first degree [O70.0] 2024 No    Gestational hypertension, third trimester [O13.3] 2024 Yes    Uterine fibroids affecting pregnancy [O34.10, D25.9] 2024 Yes    Situational mixed anxiety and depressive disorder [F43.23] 2023 Yes      Problems Resolved During this Admission:        Significant Diagnostic Studies: Labs: All labs within the past 24 hours have been reviewed      Feeding Method: breast    Immunizations       Date Immunization Status Dose Route/Site Given by    24 MMR Incomplete 0.5 mL Subcutaneous/     24 Tdap Incomplete 0.5 mL Intramuscular/             Delivery:    Episiotomy: None   Lacerations: 1st   Repair suture:     Repair # of " "packets: 1   Blood loss (ml):       Birth information:  YOB: 2022   Time of birth: 12:12 AM   Sex: male   Delivery type: Vaginal, Spontaneous   Gestational Age: 40w1d     Measurements    Weight: 2830 g  Weight (lbs): 6 lb 3.8 oz  Length: 48 cm  Length (in): 18.9"  Head circumference: 35 cm  Chest circumference: 30.5 cm  Abdominal girth: 27.5 cm         Delivery Clinician: Delivery Providers    Delivering clinician: Jany Riley CNM   Provider Role    Carlee Mills, RN Registered Nurse    Lila Montilla RN Registered Nurse    Pat Bashir, [facility]    Jaleel, Ronan J. III, NNP Nurse Practitioner    Segun Linares, TERRA Respiratory Therapist    Sandra Urias, RN Registered Nurse             Additional  information:  Forceps:    Vacuum:    Breech:    Observed anomalies      Living?:     Apgars    Living status: Living  Apgar Component Scores:  1 min.:  5 min.:  10 min.:  15 min.:  20 min.:    Skin color:  0  1       Heart rate:  2  2       Reflex irritability:  2  2       Muscle tone:  2  2       Respiratory effort:  1  2       Total:  7  9       Apgars assigned by: ARGENIS FITCH         Placenta: Delivered:       appearance  Pending Diagnostic Studies:       None            Discharged Condition: good    Disposition: Home or Self Care    Follow Up:    Patient Instructions:      Notify your health care provider if you experience any of the following:  temperature >100.4     Notify your health care provider if you experience any of the following:  persistent nausea and vomiting or diarrhea     Notify your health care provider if you experience any of the following:  severe uncontrolled pain     Notify your health care provider if you experience any of the following:  redness, tenderness, or signs of infection (pain, swelling, redness, odor or green/yellow discharge around incision site)     Notify your health care provider if you experience any of the following:  difficulty breathing or " increased cough     Activity as tolerated     Medications:  Current Discharge Medication List        START taking these medications    Details   ibuprofen (ADVIL,MOTRIN) 600 MG tablet Take 1 tablet (600 mg total) by mouth every 6 (six) hours as needed (cramping).  Qty: 90 tablet, Refills: 0           CONTINUE these medications which have NOT CHANGED    Details   PNV,calcium 72-iron-folic acid (PRENATAL VITAMIN PLUS LOW IRON) 27 mg iron- 1 mg Tab Take 1 tablet (1 each total) by mouth once daily.  Qty: 30 tablet, Refills: 11             Glory Rivero CNM  Obstetrics  [facility] ([facility])

## 2024-12-01 ENCOUNTER — HOSPITAL ENCOUNTER (OUTPATIENT)
Facility: HOSPITAL | Age: 29
Discharge: HOME | End: 2024-12-01
Attending: OBSTETRICS & GYNECOLOGY | Admitting: OBSTETRICS & GYNECOLOGY
Payer: COMMERCIAL

## 2024-12-01 VITALS
RESPIRATION RATE: 18 BRPM | DIASTOLIC BLOOD PRESSURE: 100 MMHG | SYSTOLIC BLOOD PRESSURE: 140 MMHG | OXYGEN SATURATION: 98 % | TEMPERATURE: 99 F | HEART RATE: 74 BPM

## 2024-12-01 DIAGNOSIS — O13.3 GESTATIONAL HYPERTENSION, THIRD TRIMESTER: Primary | ICD-10-CM

## 2024-12-01 LAB
ALBUMIN SERPL BCP-MCNC: 2.9 G/DL (ref 3.5–5.2)
ALP SERPL-CCNC: 185 U/L (ref 40–150)
ALT SERPL W/O P-5'-P-CCNC: 22 U/L (ref 10–44)
ANION GAP SERPL CALC-SCNC: 13 MMOL/L (ref 8–16)
AST SERPL-CCNC: 27 U/L (ref 10–40)
BASOPHILS # BLD AUTO: 0.08 K/UL (ref 0–0.2)
BASOPHILS NFR BLD: 0.7 % (ref 0–1.9)
BILIRUB SERPL-MCNC: 0.3 MG/DL (ref 0.1–1)
BUN SERPL-MCNC: 11 MG/DL (ref 6–20)
CALCIUM SERPL-MCNC: 8.8 MG/DL (ref 8.7–10.5)
CHLORIDE SERPL-SCNC: 109 MMOL/L (ref 95–110)
CO2 SERPL-SCNC: 18 MMOL/L (ref 23–29)
CREAT SERPL-MCNC: 0.8 MG/DL (ref 0.5–1.4)
CREAT UR-MCNC: 109.5 MG/DL (ref 15–325)
DIFFERENTIAL METHOD BLD: ABNORMAL
EOSINOPHIL # BLD AUTO: 0.2 K/UL (ref 0–0.5)
EOSINOPHIL NFR BLD: 2 % (ref 0–8)
ERYTHROCYTE [DISTWIDTH] IN BLOOD BY AUTOMATED COUNT: 14.1 % (ref 11.5–14.5)
EST. GFR  (NO RACE VARIABLE): >60 ML/MIN/1.73 M^2
GLUCOSE SERPL-MCNC: 94 MG/DL (ref 70–110)
HCT VFR BLD AUTO: 35.3 % (ref 37–48.5)
HGB BLD-MCNC: 11.7 G/DL (ref 12–16)
IMM GRANULOCYTES # BLD AUTO: 0.14 K/UL (ref 0–0.04)
IMM GRANULOCYTES NFR BLD AUTO: 1.3 % (ref 0–0.5)
LYMPHOCYTES # BLD AUTO: 3.2 K/UL (ref 1–4.8)
LYMPHOCYTES NFR BLD: 28.8 % (ref 18–48)
MCH RBC QN AUTO: 28.1 PG (ref 27–31)
MCHC RBC AUTO-ENTMCNC: 33.1 G/DL (ref 32–36)
MCV RBC AUTO: 85 FL (ref 82–98)
MONOCYTES # BLD AUTO: 0.6 K/UL (ref 0.3–1)
MONOCYTES NFR BLD: 5.8 % (ref 4–15)
NEUTROPHILS # BLD AUTO: 6.7 K/UL (ref 1.8–7.7)
NEUTROPHILS NFR BLD: 61.4 % (ref 38–73)
NRBC BLD-RTO: 0 /100 WBC
PLATELET # BLD AUTO: 251 K/UL (ref 150–450)
PMV BLD AUTO: 11.4 FL (ref 9.2–12.9)
POTASSIUM SERPL-SCNC: 3.4 MMOL/L (ref 3.5–5.1)
PROT SERPL-MCNC: 6.6 G/DL (ref 6–8.4)
PROT UR-MCNC: <7 MG/DL (ref 0–15)
PROT/CREAT UR: NORMAL MG/G{CREAT} (ref 0–0.2)
RBC # BLD AUTO: 4.16 M/UL (ref 4–5.4)
SODIUM SERPL-SCNC: 140 MMOL/L (ref 136–145)
WBC # BLD AUTO: 10.98 K/UL (ref 3.9–12.7)

## 2024-12-01 PROCEDURE — 51701 INSERT BLADDER CATHETER: CPT

## 2024-12-01 PROCEDURE — 25000003 PHARM REV CODE 250: Performed by: MIDWIFE

## 2024-12-01 PROCEDURE — 99211 OFF/OP EST MAY X REQ PHY/QHP: CPT

## 2024-12-01 PROCEDURE — 85025 COMPLETE CBC W/AUTO DIFF WBC: CPT | Performed by: MIDWIFE

## 2024-12-01 PROCEDURE — 80053 COMPREHEN METABOLIC PANEL: CPT | Performed by: MIDWIFE

## 2024-12-01 PROCEDURE — 82570 ASSAY OF URINE CREATININE: CPT | Performed by: MIDWIFE

## 2024-12-01 RX ORDER — NIFEDIPINE 30 MG/1
30 TABLET, EXTENDED RELEASE ORAL ONCE
Status: COMPLETED | OUTPATIENT
Start: 2024-12-01 | End: 2024-12-01

## 2024-12-01 RX ORDER — NIFEDIPINE 30 MG/1
30 TABLET, EXTENDED RELEASE ORAL DAILY
Qty: 30 TABLET | Refills: 2 | Status: SHIPPED | OUTPATIENT
Start: 2024-12-01 | End: 2025-12-01

## 2024-12-01 RX ADMIN — NIFEDIPINE 30 MG: 30 TABLET, FILM COATED, EXTENDED RELEASE ORAL at 09:12

## 2024-12-02 ENCOUNTER — PATIENT MESSAGE (OUTPATIENT)
Dept: OBSTETRICS AND GYNECOLOGY | Facility: CLINIC | Age: 29
End: 2024-12-02
Payer: COMMERCIAL

## 2024-12-02 ENCOUNTER — POSTPARTUM VISIT (OUTPATIENT)
Dept: OBSTETRICS AND GYNECOLOGY | Facility: CLINIC | Age: 29
End: 2024-12-02
Payer: COMMERCIAL

## 2024-12-02 VITALS
DIASTOLIC BLOOD PRESSURE: 88 MMHG | WEIGHT: 180.31 LBS | SYSTOLIC BLOOD PRESSURE: 124 MMHG | HEIGHT: 60 IN | BODY MASS INDEX: 35.4 KG/M2

## 2024-12-02 DIAGNOSIS — O13.3 GESTATIONAL HYPERTENSION, THIRD TRIMESTER: ICD-10-CM

## 2024-12-02 PROCEDURE — 99999 PR PBB SHADOW E&M-EST. PATIENT-LVL II: CPT | Mod: PBBFAC,,, | Performed by: PHYSICIAN ASSISTANT

## 2024-12-02 PROCEDURE — 0503F POSTPARTUM CARE VISIT: CPT | Mod: CPTII,S$GLB,, | Performed by: PHYSICIAN ASSISTANT

## 2024-12-02 NOTE — HPI
29 y.o. s/p  on 2024 followed by IOL for GHTN, c/o elevated blood pressure readings at home, slight headache, and swelling in lower extremities.

## 2024-12-02 NOTE — DISCHARGE INSTRUCTIONS
"Mother Self Care:    Activity: Avoid strenuous exercise and get adequate rest.  No driving until the physician consent given.  Emotional Changes: Most women find birth to be a time of great emotional upheaval.  Sense of loss, mood swings, fatigue, anxiety, and feeling "let down" are common.  If feelings worsen or last more than a week, call your physician.  Breast Care/Breastfeeding: Wear a bra for comfort.  Keep nipples dry and apply your own breast milk or lanolin cream as needed for soreness.  Engorgement can be relieved with warm, moist heat before feedings.  You may also take Ibuprofen.  Breast Care/Bottle Feeding: Wear support bra 24 hours a day for one week.  Avoid stimulation to breasts.  You may use ice packs for discomfort.  Mira-Care/Vaginal Bleeding: Remember to use your mira-bottle after urinating.  Your flow will change from red, to pink, to yellow/white color over a period of 2 weeks.  Menstruation will return in 3-8 weeks, or longer if breastfeeding.  Episiotomy Vaginal Delivery: Stitches will dissolve within 10 days to 3 weeks.  Warm baths, tucks, and dermoplast will promote healing.  Avoid bubble baths or strong soaps.   Section/Tubal Ligation: Keep incision clean and dry. You may shower, but avoid baths. Please continue to use Nozin twice a day for 7 days after surgery. Ensure you attend your 1 week post op visit to have your dressing removed. Use antibacterial soap to wash your entire body until directed otherwise by a Physician, it is very important to shower daily. If you become concerned about the way your incision site looks, please contact your providers office.   Sexual Activity/Pelvic Rest: No sexual activity, tampons, or douching until your physician gives you consent.  Diet: Continue to eat from the five basic food groups, including plenty of protein, fruits, vegetables, and whole grains.  Limit empty calories and high fat foods.  Drink enough fluids to satisfy thirst and add an " "extra 500 calories for breastfeeding.  Constipation/Hemorrhoids: Drink plenty of water.  You may take a stool softener or natural laxative (Metamucil). You may use tucks or hemorrhoid ointment and soak in a warm tub.    "What does help look like to you when you go home?"  "Is there any need that you anticipate that we may be able to assist with?"    CALL YOUR OB DOCTOR IF ANY OF THE FOLLOWING OCCURS:  *Heavy bleeding - saturating a pad an hour or passing any large (2-3 inches in size) blood clots.  *Any pain, redness, or tenderness in lower leg.  *You cannot care for yourself or your baby.  *Any signs of infection-      - Temperature greater than 100.5 degrees F      - Foul smelling vaginal discharge and/or incisional drainage      - Increased episiotomy or incisional pain      - Hot, hard, red or sore area on breast      - Flu-like symptoms      - Any urgency, frequency or burning with urination    Return To the Hospital for further Evaluation:  Headache not relieved by tylenol or ibuprofen  Blurry vision, double vision, seeing spots, or flashing lights  Feeling faint or passing out  Right epigastric pain  Difficulty breathing  Swelling in hands, face, or feet  Any of these symptoms accompanied by nausea/vomiting  Gaining more than 5 pounds in one week  Seizures  These symptoms could be an indication of elevated blood pressure.     For patients that were treated for high blood pressure during pregnancy and or your hospital stay you will need a blood pressure check three days after you leave the hospital. Your nursing staff will assist you in an appointment if needed. If you have Connected Mom you may use your blood pressure cuff and report any readings 140/90 to your provider immediately.       If you have any questions that need to be answered immediately please call the Labor & Delivery Unit at [phone] and ask to speak to a nurse.      Please see [facility] BLUE folder for additional information and handouts. "

## 2024-12-02 NOTE — HOSPITAL COURSE
Pre-eclampsia work up negative.   Serial blood pressure readings.  Discussed findings with on call MD. Begin daily Procardia XL. Will give one dose now. Pt stable for discharge home. Continue at home BP monitoring. Has BP f/u in clinic tomorrow, urged to keep appointment.

## 2024-12-02 NOTE — SUBJECTIVE & OBJECTIVE
OB History    Para Term  AB Living   1 1 1 0 0 1   SAB IAB Ectopic Multiple Live Births   0 0 0 0 1      # Outcome Date GA Lbr John/2nd Weight Sex Type Anes PTL Lv   1 Term 24 40w1d  2.83 kg (6 lb 3.8 oz) M Vag-Spont EPI N ARIANNE      Name: Trung Ruiz      Apgar1: 7  Apgar5: 9     Past Medical History:   Diagnosis Date    Anxiety disorder, unspecified     Insomnia     Migraines      Past Surgical History:   Procedure Laterality Date    EYE SURGERY      PRK    wrist cyst removal Right        PTA Medications   Medication Sig    ibuprofen (ADVIL,MOTRIN) 600 MG tablet Take 1 tablet (600 mg total) by mouth every 6 (six) hours as needed (cramping).    PNV,calcium 72-iron-folic acid (PRENATAL VITAMIN PLUS LOW IRON) 27 mg iron- 1 mg Tab Take 1 tablet (1 each total) by mouth once daily.       Review of patient's allergies indicates:  No Known Allergies     Family History       Problem Relation (Age of Onset)    Breast cancer Maternal Grandmother (66)    Cancer Maternal Grandfather    Leukemia Maternal Grandfather          Tobacco Use    Smoking status: Never     Passive exposure: Never    Smokeless tobacco: Never   Substance and Sexual Activity    Alcohol use: Not Currently     Comment: occ    Drug use: Never    Sexual activity: Yes     Partners: Male     Birth control/protection: OCP     Review of Systems   Constitutional:  Positive for activity change.   Cardiovascular:  Positive for leg swelling.   Neurological:  Positive for headaches.      Objective:     Vital Signs (Most Recent):  Temp: 98.8 °F (37.1 °C) (24)  Pulse: 95 (24)  Resp: 18 (24)  BP: (!) 128/92 (24)  SpO2: 98 % (24) Vital Signs (24h Range):  Temp:  [98.8 °F (37.1 °C)] 98.8 °F (37.1 °C)  Pulse:  [74-95] 95  Resp:  [18] 18  SpO2:  [98 %-100 %] 98 %  BP: (127-140)/() 139/100        There is no height or weight on file to calculate BMI.    Patient's last menstrual period was  02/22/2024 (exact date).     Physical Exam:   Constitutional: She is oriented to person, place, and time. She appears well-developed and well-nourished.    HENT:   Head: Normocephalic.    Eyes: Conjunctivae are normal.      Pulmonary/Chest: Effort normal.        Abdominal: Soft.             Musculoskeletal: Normal range of motion.       Neurological: She is alert and oriented to person, place, and time.    Skin: Skin is warm and dry.    Psychiatric: She has a normal mood and affect. Her behavior is normal. Judgment and thought content normal.        Laboratory:  I have personallly reviewed all pertinent lab results from the last 24 hours.    Diagnostic Results:  Labs: Reviewed

## 2024-12-02 NOTE — H&P
[facility]  Obstetrics & Gynecology  History & Physical    Patient Name: Saima Ruiz  MRN: [id number]  Admission Date: 2024  Primary Care Provider: David Simmons MD    Subjective:     Chief Complaint/Reason for Admission: Elevated blood pressure reading     History of Present Illness:  29 y.o. s/p  on 2024 followed by IOL for GHTN, c/o elevated blood pressure readings at home, slight headache, and swelling in lower extremities.         OB History    Para Term  AB Living   1 1 1 0 0 1   SAB IAB Ectopic Multiple Live Births   0 0 0 0 1      # Outcome Date GA Lbr John/2nd Weight Sex Type Anes PTL Lv   1 Term 24 40w1d  2.83 kg (6 lb 3.8 oz) M Vag-Spont EPI N ARIANNE      Name: Boy Saima Ruiz      Apgar1: 7  Apgar5: 9     Past Medical History:   Diagnosis Date    Anxiety disorder, unspecified     Insomnia     Migraines      Past Surgical History:   Procedure Laterality Date    EYE SURGERY      PRK    wrist cyst removal Right        PTA Medications   Medication Sig    ibuprofen (ADVIL,MOTRIN) 600 MG tablet Take 1 tablet (600 mg total) by mouth every 6 (six) hours as needed (cramping).    PNV,calcium 72-iron-folic acid (PRENATAL VITAMIN PLUS LOW IRON) 27 mg iron- 1 mg Tab Take 1 tablet (1 each total) by mouth once daily.       Review of patient's allergies indicates:  No Known Allergies     Family History       Problem Relation (Age of Onset)    Breast cancer Maternal Grandmother (66)    Cancer Maternal Grandfather    Leukemia Maternal Grandfather          Tobacco Use    Smoking status: Never     Passive exposure: Never    Smokeless tobacco: Never   Substance and Sexual Activity    Alcohol use: Not Currently     Comment: occ    Drug use: Never    Sexual activity: Yes     Partners: Male     Birth control/protection: OCP     Review of Systems   Constitutional:  Positive for activity change.   Cardiovascular:  Positive for leg swelling.   Neurological:  Positive for  headaches.      Objective:     Vital Signs (Most Recent):  Temp: 98.8 °F (37.1 °C) (12/01/24 2040)  Pulse: 95 (12/01/24 2120)  Resp: 18 (12/01/24 2040)  BP: (!) 128/92 (12/01/24 2143)  SpO2: 98 % (12/01/24 2120) Vital Signs (24h Range):  Temp:  [98.8 °F (37.1 °C)] 98.8 °F (37.1 °C)  Pulse:  [74-95] 95  Resp:  [18] 18  SpO2:  [98 %-100 %] 98 %  BP: (127-140)/() 139/100        There is no height or weight on file to calculate BMI.    Patient's last menstrual period was 02/22/2024 (exact date).     Physical Exam:   Constitutional: She is oriented to person, place, and time. She appears well-developed and well-nourished.    HENT:   Head: Normocephalic.    Eyes: Conjunctivae are normal.      Pulmonary/Chest: Effort normal.        Abdominal: Soft.             Musculoskeletal: Normal range of motion.       Neurological: She is alert and oriented to person, place, and time.    Skin: Skin is warm and dry.    Psychiatric: She has a normal mood and affect. Her behavior is normal. Judgment and thought content normal.        Laboratory:  I have personallly reviewed all pertinent lab results from the last 24 hours.    Diagnostic Results:  Labs: Reviewed    Assessment/Plan:     Cardiac/Vascular  * Gestational hypertension, third trimester  Pre-eclampsia work up negative.   Serial blood pressure readings.  Discussed findings with on call MD. Begin daily Procardia XL. Will give one dose now. Pt stable for discharge home. Continue at home BP monitoring. Has BP f/u in clinic tomorrow, urged to keep appointment.            Irene Ramirez CNM  Obstetrics & Gynecology  [facility]

## 2024-12-04 ENCOUNTER — PATIENT MESSAGE (OUTPATIENT)
Dept: OBSTETRICS AND GYNECOLOGY | Facility: CLINIC | Age: 29
End: 2024-12-04
Payer: COMMERCIAL

## 2024-12-04 DIAGNOSIS — R30.0 DYSURIA: Primary | ICD-10-CM

## 2024-12-07 ENCOUNTER — OCHSNER VIRTUAL EMERGENCY DEPARTMENT (OUTPATIENT)
Facility: CLINIC | Age: 29
End: 2024-12-07
Payer: COMMERCIAL

## 2024-12-07 ENCOUNTER — NURSE TRIAGE (OUTPATIENT)
Dept: ADMINISTRATIVE | Facility: CLINIC | Age: 29
End: 2024-12-07
Payer: COMMERCIAL

## 2024-12-07 ENCOUNTER — TELEPHONE (OUTPATIENT)
Dept: OBSTETRICS AND GYNECOLOGY | Facility: HOSPITAL | Age: 29
End: 2024-12-07
Payer: COMMERCIAL

## 2024-12-07 DIAGNOSIS — R30.0 DYSURIA: Primary | ICD-10-CM

## 2024-12-07 RX ORDER — NITROFURANTOIN 25; 75 MG/1; MG/1
100 CAPSULE ORAL 2 TIMES DAILY
Qty: 10 CAPSULE | Refills: 0 | Status: SHIPPED | OUTPATIENT
Start: 2024-12-07 | End: 2024-12-12

## 2024-12-08 NOTE — TELEPHONE ENCOUNTER
Post partum patient reports feeling as if she has a UTI. Reports a constant 6/10 discomfort that worsens when she urinates. Denies blood in urine or emesis/fever. I have spoke with Dr Calderon who is on call. Reports patient may be evaluated in an [facility] center tomorrow or she can call her in antibiotics. Patient opted for antibiotics to be called into the [facility] in [place]. I have secure chatted this info to provider, per request. Patient updated and will call back with any further questions/concerns or worsening symptoms.    Reason for Disposition   SEVERE pain with urination (e.g., excruciating)    Additional Information   Negative: Shock suspected (e.g., cold/pale/clammy skin, too weak to stand, low BP, rapid pulse)   Negative: Sounds like a life-threatening emergency to the triager   Negative: [1] Unable to urinate (or only a few drops) > 4 hours AND [2] bladder feels very full (e.g., palpable bladder or strong urge to urinate)   Negative: Fever 100.4 F (38.0 C) or higher   Negative: Patient sounds very sick or weak to the triager    Protocols used: Postpartum - Urination Pain-A-AH

## 2024-12-08 NOTE — PLAN OF CARE-OVED
"[facility]    Referral source: Nurse On-Call      Discussed patient with: Nurse On Call      Reason for consult: Abdominal Pain  "All of my calls are coming to yall tonight! This patient is post partum. Vaginal delivery on . She feels she may have a UTI. Reports a constant 6/10 pain the becomes worse w/ urination. No vaginal discharge or fevers. +bleeding but same since delivery, no worsening."    Medical Record Review: reviewed -  induced due to GHTN. Followed up recently with leg swelling and hypertension and started on procardia.       Disposition recommended: Treat in Place  Nurse on call discussed with on call OB      Additional Recommendations: none      Ivanna Carvalho MD        "

## 2024-12-09 ENCOUNTER — TELEPHONE (OUTPATIENT)
Dept: OBSTETRICS AND GYNECOLOGY | Facility: HOSPITAL | Age: 29
End: 2024-12-09
Payer: COMMERCIAL

## 2024-12-09 NOTE — TELEPHONE ENCOUNTER
"Pt called and reports that her baby "Latonia Ruiz" was born on 11/28/24. Mother reports that she was pumping her breasts during the day time and breastfeeding during the night. Mother reports that for the last 5 days she has been exclusively breastfeeding. Mother reports that baby has gas and green stools. Mother reports that baby is unable to empty one breast at a feeding. Advised mother of option to block feed and to feed infant from one breast during each feeding session. Mother reports that the baby has only been taking one breast during nursing sessions. Advised mother that she can feed from the same breast from two feedings in a row. Encouraged mother to pump other breast to just soften the breast if breast become too full. This should help decrease supply to ensure infant receives the hindmilk during the nursing sessions. Advised mother of symptoms of mastitis and encouraged mother to call MD if symptoms become present during this time. Mother verbalizes understanding of all education and counseling. Mother denies any further lactation needs or concerns at this time. Discussed lactation availability. Encouraged mother to call for assistance when needs arise.   "

## 2024-12-27 ENCOUNTER — POSTPARTUM VISIT (OUTPATIENT)
Dept: OBSTETRICS AND GYNECOLOGY | Facility: CLINIC | Age: 29
End: 2024-12-27
Payer: COMMERCIAL

## 2024-12-27 VITALS
BODY MASS INDEX: 33.24 KG/M2 | DIASTOLIC BLOOD PRESSURE: 78 MMHG | WEIGHT: 170.19 LBS | SYSTOLIC BLOOD PRESSURE: 124 MMHG

## 2024-12-27 DIAGNOSIS — M62.89 PELVIC FLOOR DYSFUNCTION IN FEMALE: Primary | ICD-10-CM

## 2024-12-27 DIAGNOSIS — Z30.011 ENCOUNTER FOR INITIAL PRESCRIPTION OF CONTRACEPTIVE PILLS: ICD-10-CM

## 2024-12-27 DIAGNOSIS — O13.3 GESTATIONAL HYPERTENSION, THIRD TRIMESTER: ICD-10-CM

## 2024-12-27 PROCEDURE — 99999 PR PBB SHADOW E&M-EST. PATIENT-LVL III: CPT | Mod: PBBFAC,,,

## 2024-12-27 PROCEDURE — 88175 CYTOPATH C/V AUTO FLUID REDO: CPT

## 2024-12-27 PROCEDURE — 0503F POSTPARTUM CARE VISIT: CPT | Mod: CPTII,S$GLB,,

## 2024-12-27 RX ORDER — NORETHINDRONE 0.35 MG/1
1 TABLET ORAL DAILY
Qty: 30 TABLET | Refills: 11 | Status: SHIPPED | OUTPATIENT
Start: 2024-12-27 | End: 2025-12-27

## 2025-01-01 NOTE — PROGRESS NOTES
Subjective:       Saima Ruiz is a 29 y.o. female who presents for a postpartum visit. She is 4 weeks postpartum following a spontaneous vaginal delivery. I have fully reviewed the prenatal and intrapartum course. The delivery was at 40.1 gestational weeks. Outcome: spontaneous vaginal delivery. Anesthesia: epidural. Postpartum course has been complicated by UTI but has been treated. Baby's course has been uncomplicated. Baby is feeding by breast. Bleeding staining only. Bowel function is normal. Bladder function is normal. Patient is not sexually active. Contraception method is none. Postpartum depression screening: negative.    The following portions of the patient's history were reviewed and updated as appropriate: allergies, current medications, past family history, past medical history, past social history, past surgical history, and problem list.    Review of Systems  Pertinent items are noted in HPI.     Objective:      /78   Wt 77.2 kg (170 lb 3.1 oz)   LMP 02/22/2024 (Exact Date)   BMI 33.24 kg/m²    General:  alert, appears stated age, and cooperative                    Vulva:  normal   Vagina: First degree laceration healing well. Sutures still noted on right vag floor. Well approximated with n o evidence of infection   Cervix:  no cervical motion tenderness   Corpus: normal   Adnexa:  normal adnexa   Rectal Exam: Not performed.          Assessment:      routine postpartum exam. Pap smear done at today's visit.     Plan:   Pelvic floor dysfunction in female  -     Ambulatory Referral/Consult to Physical Therapy; Future; Expected date: 01/03/2025    Gestational hypertension, third trimester  - BP wNL today  Encounter for postpartum visit  -     Liquid-Based Pap Smear, Screening  -     RTC 1 year for WWE     Encounter for initial prescription of contraceptive pills  -     norethindrone (MICRONOR) 0.35 mg tablet; Take 1 tablet (0.35 mg total) by mouth once daily.  Dispense: 30 tablet;  Refill: 11

## 2025-01-03 LAB
FINAL PATHOLOGIC DIAGNOSIS: NORMAL
Lab: NORMAL

## 2025-01-15 ENCOUNTER — PATIENT MESSAGE (OUTPATIENT)
Dept: OBSTETRICS AND GYNECOLOGY | Facility: CLINIC | Age: 30
End: 2025-01-15
Payer: COMMERCIAL

## 2025-03-11 ENCOUNTER — PATIENT MESSAGE (OUTPATIENT)
Dept: OBSTETRICS AND GYNECOLOGY | Facility: CLINIC | Age: 30
End: 2025-03-11
Payer: COMMERCIAL